# Patient Record
Sex: FEMALE | Race: WHITE | NOT HISPANIC OR LATINO | Employment: PART TIME | URBAN - NONMETROPOLITAN AREA
[De-identification: names, ages, dates, MRNs, and addresses within clinical notes are randomized per-mention and may not be internally consistent; named-entity substitution may affect disease eponyms.]

---

## 2017-05-22 ENCOUNTER — OFFICE VISIT (OUTPATIENT)
Dept: FAMILY MEDICINE | Facility: CLINIC | Age: 32
End: 2017-05-22
Payer: COMMERCIAL

## 2017-05-22 VITALS
WEIGHT: 139 LBS | BODY MASS INDEX: 27.29 KG/M2 | SYSTOLIC BLOOD PRESSURE: 124 MMHG | HEIGHT: 60 IN | TEMPERATURE: 99.3 F | HEART RATE: 68 BPM | RESPIRATION RATE: 16 BRPM | DIASTOLIC BLOOD PRESSURE: 82 MMHG

## 2017-05-22 DIAGNOSIS — R10.13 ABDOMINAL PAIN, EPIGASTRIC: Primary | ICD-10-CM

## 2017-05-22 LAB
ERYTHROCYTE [DISTWIDTH] IN BLOOD BY AUTOMATED COUNT: 13 % (ref 10–15)
HCT VFR BLD AUTO: 44.2 % (ref 35–47)
HGB BLD-MCNC: 15.6 G/DL (ref 11.7–15.7)
MCH RBC QN AUTO: 30.9 PG (ref 26.5–33)
MCHC RBC AUTO-ENTMCNC: 35.3 G/DL (ref 31.5–36.5)
MCV RBC AUTO: 88 FL (ref 78–100)
PLATELET # BLD AUTO: 270 10E9/L (ref 150–450)
RBC # BLD AUTO: 5.05 10E12/L (ref 3.8–5.2)
WBC # BLD AUTO: 9.9 10E9/L (ref 4–11)

## 2017-05-22 PROCEDURE — 99213 OFFICE O/P EST LOW 20 MIN: CPT | Performed by: FAMILY MEDICINE

## 2017-05-22 PROCEDURE — 36415 COLL VENOUS BLD VENIPUNCTURE: CPT | Performed by: FAMILY MEDICINE

## 2017-05-22 PROCEDURE — 83690 ASSAY OF LIPASE: CPT | Performed by: FAMILY MEDICINE

## 2017-05-22 PROCEDURE — 80053 COMPREHEN METABOLIC PANEL: CPT | Performed by: FAMILY MEDICINE

## 2017-05-22 PROCEDURE — 85027 COMPLETE CBC AUTOMATED: CPT | Performed by: FAMILY MEDICINE

## 2017-05-22 NOTE — NURSING NOTE
Chief Complaint   Patient presents with     Abdominal Pain       Initial /82 (Cuff Size: Adult Regular)  Pulse 68  Temp 99.3  F (37.4  C) (Tympanic)  Resp 16  Ht 5' (1.524 m)  Wt 139 lb (63 kg)  LMP 05/01/2017  Breastfeeding? No  BMI 27.15 kg/m2 Estimated body mass index is 27.15 kg/(m^2) as calculated from the following:    Height as of this encounter: 5' (1.524 m).    Weight as of this encounter: 139 lb (63 kg).  Medication Reconciliation: complete    Health Maintenance that is potentially due pending provider review:  NONE    n/a

## 2017-05-22 NOTE — PATIENT INSTRUCTIONS
Abdominal Pain  Abdominal pain is pain in the stomach or intestinal area. Everyone has this pain from time to time. In many cases it goes away on its own. But abdominal pain can sometimes be due to a serious problem, such as appendicitis. So it s important to know when to seek help.  Causes of abdominal pain  There are many possible causes of abdominal pain. Common causes in adults include:    Constipation, diarrhea, or gas    GERD (gastroesophageal reflux disease) movement of stomach acid into the esophagus, also known as acid reflux or heartburn    Peptic ulcer (a sore in the lining of the stomach or small intestine)    Inflammation of the gallbladder, liver, or pancreas    Gallstones or kidney stones    Appendicitis     Obstruction of the intestines     Hernia (bulging of an internal organ through a muscle or other tissue)    Urinary tract infections    In women, menstrual cramps, fibroids, or endometriosis of the uterus    Inflammation or infection of the intestines  Diagnosing the cause of abdominal pain  Your health care provider will examine you to help find the cause of your pain. If needed, tests will be ordered. Because abdominal pain has so many possible causes, it can be hard to discover the reason for the pain. Giving details about your pain can help. Be ready to tell your health care provider where and when you feel the pain and what makes it better or worse. Also mention whether you have other symptoms such as fever, tiredness, nausea, vomiting, or changes in bathroom habits.  Treating abdominal pain  Certain causes of pain, such as appendicitis or a bowel obstruction, need emergency treatment. Other problems can be treated with rest, fluids, or medications. Your health care provider can give you specific instructions for treatment or self-care based on the cause of your pain.  If you have vomiting or diarrhea, sip water or other clear fluids. When you are ready to eat solid foods again, start with  small amounts of easy-to-digest, low-fat foods, such as applesauce, toast, or crackers.   When to call the doctor  Call 911 or go to the hospital right away if you:    Can t pass stool and are vomiting    Are vomiting blood or have black, tarry diarrhea    Also have chest, neck, or shoulder pain    Feel like you are about to pass out    Have pain in your shoulder blades with nausea    Have sudden, excruciating abdominal pain    Have new, severe pain unlike any you have felt before    Have a belly that is rigid, hard, and tender to touch  Call your doctor if you have:    Pain for more than 5 days    Bloating for more than 2 days    Diarrhea for more than 5 days    Fever of 101 F (38.3 C) or higher    Pain that continues to worsen    Unexplained weight loss    Continued lack of appetite    Blood in the stool  How to prevent abdominal pain  Here are some tips to help prevent abdominal pain:    Eat smaller amounts of food at one time.    Avoid greasy, fried, or other high-fat foods.    Avoid foods that give you gas.    Exercise regularly.    Drink plenty of fluids.  To help prevent symptoms of gastroesophageal reflux disease (GERD):    Quit smoking.    Reduce alcohol and certain foods that increase stomach acid.     Lose excess weight.    Finish eating at least 2 hours before you go to bed or lie down.    Elevate the head of your bed.    6155-3077 [employer]. [address] [place] [address], [place] [address]. All rights reserved. This information is not intended as a substitute for professional medical care. Always follow your healthcare professional's instructions.

## 2017-05-22 NOTE — PROGRESS NOTES
SUBJECTIVE:                                                    Shira Forbes is a 32 year old female who presents to clinic today for the following health issues:      Abdominal Pain      Duration: about a month- progressively worsening    Description (location/character/radiation): epigastric right quadrant-radiatiating into back       Associated flank pain: None    Intensity:  moderate, severe    Accompanying signs and symptoms:        Fever/Chills: no        Gas/Bloating: no        Nausea/vomitting: YES       Diarrhea: no        Dysuria or Hematuria: no     History (previous similar pain/trauma/previous testing): none    Precipitating or alleviating factors:       Pain worse with eating/BM/urination: yes- pain gets worse about 2 hours after eating        Pain relieved by BM: no     Therapies tried and outcome: Tums, omeprazole, zantac, diet modification,     LMP:  2-3 weeks ago          Problem list and histories reviewed & adjusted, as indicated.  Additional history: as documented    Patient Active Problem List   Diagnosis     Contact dermatitis and other eczema, due to unspecified cause     Vitamin D deficiency     CARDIOVASCULAR SCREENING; LDL GOAL LESS THAN 160     Malrotation of intestine     S/P appy     H/O left inguinal hernia repair     Anxiety     Past Surgical History:   Procedure Laterality Date     APPENDECTOMY       LAPAROSCOPIC HERNIORRHAPHY INGUINAL  3/28/2012    Procedure:LAPAROSCOPIC HERNIORRHAPHY INGUINAL; Laparoscopic Left Inguinal Herniorrhaphy Possible Right Inguinal Hernia; Surgeon:EMMANUELLE CHAPPELL; Location:[facility]     SURGICAL HISTORY OF -   05/20/06    D&C for incomplete AB       Social History   Substance Use Topics     Smoking status: Never Smoker     Smokeless tobacco: Never Used     Alcohol use Yes      Comment: Occas/quit with pregnancy     Family History   Problem Relation Age of Onset     Psychotic Disorder Maternal Grandmother      Alcohol/Drug Maternal Grandmother       alcohol     HEART DISEASE Maternal Grandfather      MI     Hypertension Maternal Grandfather      DIABETES Maternal Grandfather      Arthritis Paternal Grandmother      RA     Parkinsonism Paternal Grandfather      Lipids Paternal Grandmother      HEART DISEASE Paternal Grandfather      pacemaker         Current Outpatient Prescriptions   Medication Sig Dispense Refill     clobetasol (TEMOVATE) 0.05 % ointment Apply sparingly to affected area twice daily as needed.  Do not apply to face. 60 g 1     triamcinolone (KENALOG) 0.1 % ointment Apply sparingly to affected area three times daily for 14 days. 80 g 1     Allergies   Allergen Reactions     Keflex [Cephalexin Monohydrate] Rash     Morphine Hives     Shellfish Allergy      Sulfa Drugs Rash     Recent Labs   Lab Test  05/23/16   0956  12/21/14   0847  06/28/13   1032  04/26/12   2220   ALT   --   18   --   20   CR   --   0.63   --   0.80   GFRESTIMATED   --   >90  Non  GFR Calc     --   85   GFRESTBLACK   --   >90   GFR Calc     --   >90   POTASSIUM   --   3.5   --   3.7   TSH  2.17   --   1.89  3.31      BP Readings from Last 3 Encounters:   05/22/17 124/82   05/23/16 126/87   09/29/15 110/60    Wt Readings from Last 3 Encounters:   05/22/17 139 lb (63 kg)   05/23/16 128 lb (58.1 kg)   09/29/15 141 lb 9.6 oz (64.2 kg)                  Labs reviewed in EPIC    Reviewed and updated as needed this visit by clinical staff       Reviewed and updated as needed this visit by Provider         ROS:  Constitutional, HEENT, cardiovascular, pulmonary, gi and gu systems are negative, except as otherwise noted.    OBJECTIVE:                                                    /82 (Cuff Size: Adult Regular)  Pulse 68  Temp 99.3  F (37.4  C) (Tympanic)  Resp 16  Ht 5' (1.524 m)  Wt 139 lb (63 kg)  LMP 05/01/2017  Breastfeeding? No  BMI 27.15 kg/m2  Body mass index is 27.15 kg/(m^2).  GENERAL: healthy, alert and no distress  EYES: Eyes  grossly normal to inspection, PERRL and conjunctivae and sclerae normal  NECK: no adenopathy, no asymmetry, masses, or scars and thyroid normal to palpation  RESP: lungs clear to auscultation - no rales, rhonchi or wheezes  CV: regular rate and rhythm, normal S1 S2, no S3 or S4, no murmur, click or rub, no peripheral edema and peripheral pulses strong  ABDOMEN: soft, mildly tender epigastrium and right upper quadrant without any guarding or rigidity, no organomegaly  MS: no gross musculoskeletal defects noted, no edema  NEURO: Normal strength and tone, mentation intact and speech normal     ASSESSMENT/PLAN:                                                          ICD-10-CM    1. Abdominal pain, epigastric R10.13 CBC with platelets     Comprehensive metabolic panel (BMP + Alb, Alk Phos, ALT, AST, Total. Bili, TP)     Lipase     US Abdomen Limited     H Pylori antigen, stool     32-year-old female presents with epigastric abdominal pain along with nausea. Symptoms started about a month ago, associated with food intake. Patient hasn't been eating well due to pain. LMP 2-3 weeks ago, declining pregnancy testing. She denies any constipation, diarrhea, urinary or other relevant systemic symptoms. She has had GERD before but states that current symptoms feels different. Differentials are broad including but not limited to GERD, peptic ulcer disease, biliary tract disorder and pancreatitis. CBC, CMP, lipase and h-pylori stool antigen ordered. Will do ultrasound biliary tract as well. Suggested to continue prilosec, well hydration and avoid Diet Coke (takes 1-2 cans daily). Will inform her with the results once available. Patient understood and in agreement with the above plan. All questions answered.        Patient Instructions       Abdominal Pain  Abdominal pain is pain in the stomach or intestinal area. Everyone has this pain from time to time. In many cases it goes away on its own. But abdominal pain can sometimes be  due to a serious problem, such as appendicitis. So it s important to know when to seek help.  Causes of abdominal pain  There are many possible causes of abdominal pain. Common causes in adults include:    Constipation, diarrhea, or gas    GERD (gastroesophageal reflux disease) movement of stomach acid into the esophagus, also known as acid reflux or heartburn    Peptic ulcer (a sore in the lining of the stomach or small intestine)    Inflammation of the gallbladder, liver, or pancreas    Gallstones or kidney stones    Appendicitis     Obstruction of the intestines     Hernia (bulging of an internal organ through a muscle or other tissue)    Urinary tract infections    In women, menstrual cramps, fibroids, or endometriosis of the uterus    Inflammation or infection of the intestines  Diagnosing the cause of abdominal pain  Your health care provider will examine you to help find the cause of your pain. If needed, tests will be ordered. Because abdominal pain has so many possible causes, it can be hard to discover the reason for the pain. Giving details about your pain can help. Be ready to tell your health care provider where and when you feel the pain and what makes it better or worse. Also mention whether you have other symptoms such as fever, tiredness, nausea, vomiting, or changes in bathroom habits.  Treating abdominal pain  Certain causes of pain, such as appendicitis or a bowel obstruction, need emergency treatment. Other problems can be treated with rest, fluids, or medications. Your health care provider can give you specific instructions for treatment or self-care based on the cause of your pain.  If you have vomiting or diarrhea, sip water or other clear fluids. When you are ready to eat solid foods again, start with small amounts of easy-to-digest, low-fat foods, such as applesauce, toast, or crackers.   When to call the doctor  Call 911 or go to the hospital right away if you:    Can t pass stool and are  vomiting    Are vomiting blood or have black, tarry diarrhea    Also have chest, neck, or shoulder pain    Feel like you are about to pass out    Have pain in your shoulder blades with nausea    Have sudden, excruciating abdominal pain    Have new, severe pain unlike any you have felt before    Have a belly that is rigid, hard, and tender to touch  Call your doctor if you have:    Pain for more than 5 days    Bloating for more than 2 days    Diarrhea for more than 5 days    Fever of 101 F (38.3 C) or higher    Pain that continues to worsen    Unexplained weight loss    Continued lack of appetite    Blood in the stool  How to prevent abdominal pain  Here are some tips to help prevent abdominal pain:    Eat smaller amounts of food at one time.    Avoid greasy, fried, or other high-fat foods.    Avoid foods that give you gas.    Exercise regularly.    Drink plenty of fluids.  To help prevent symptoms of gastroesophageal reflux disease (GERD):    Quit smoking.    Reduce alcohol and certain foods that increase stomach acid.     Lose excess weight.    Finish eating at least 2 hours before you go to bed or lie down.    Elevate the head of your bed.    [id number] [employer]. [address] [place] [address]. All rights reserved. This information is not intended as a substitute for professional medical care. Always follow your healthcare professional's instructions.            Carlos Herring MD  [facility]

## 2017-05-22 NOTE — MR AVS SNAPSHOT
After Visit Summary   5/22/2017    Shira Forbes    MRN: [id number]           Patient Information     Date Of Birth          1985        Visit Information        Provider Department      5/22/2017 3:40 PM Carlos Herring MD [facility]        Today's Diagnoses     Abdominal pain, epigastric    -  1      Care Instructions      Abdominal Pain  Abdominal pain is pain in the stomach or intestinal area. Everyone has this pain from time to time. In many cases it goes away on its own. But abdominal pain can sometimes be due to a serious problem, such as appendicitis. So it s important to know when to seek help.  Causes of abdominal pain  There are many possible causes of abdominal pain. Common causes in adults include:    Constipation, diarrhea, or gas    GERD (gastroesophageal reflux disease) movement of stomach acid into the esophagus, also known as acid reflux or heartburn    Peptic ulcer (a sore in the lining of the stomach or small intestine)    Inflammation of the gallbladder, liver, or pancreas    Gallstones or kidney stones    Appendicitis     Obstruction of the intestines     Hernia (bulging of an internal organ through a muscle or other tissue)    Urinary tract infections    In women, menstrual cramps, fibroids, or endometriosis of the uterus    Inflammation or infection of the intestines  Diagnosing the cause of abdominal pain  Your health care provider will examine you to help find the cause of your pain. If needed, tests will be ordered. Because abdominal pain has so many possible causes, it can be hard to discover the reason for the pain. Giving details about your pain can help. Be ready to tell your health care provider where and when you feel the pain and what makes it better or worse. Also mention whether you have other symptoms such as fever, tiredness, nausea, vomiting, or changes in bathroom habits.  Treating abdominal pain  Certain causes of pain, such as  appendicitis or a bowel obstruction, need emergency treatment. Other problems can be treated with rest, fluids, or medications. Your health care provider can give you specific instructions for treatment or self-care based on the cause of your pain.  If you have vomiting or diarrhea, sip water or other clear fluids. When you are ready to eat solid foods again, start with small amounts of easy-to-digest, low-fat foods, such as applesauce, toast, or crackers.   When to call the doctor  Call 911 or go to the hospital right away if you:    Can t pass stool and are vomiting    Are vomiting blood or have black, tarry diarrhea    Also have chest, neck, or shoulder pain    Feel like you are about to pass out    Have pain in your shoulder blades with nausea    Have sudden, excruciating abdominal pain    Have new, severe pain unlike any you have felt before    Have a belly that is rigid, hard, and tender to touch  Call your doctor if you have:    Pain for more than 5 days    Bloating for more than 2 days    Diarrhea for more than 5 days    Fever of 101 F (38.3 C) or higher    Pain that continues to worsen    Unexplained weight loss    Continued lack of appetite    Blood in the stool  How to prevent abdominal pain  Here are some tips to help prevent abdominal pain:    Eat smaller amounts of food at one time.    Avoid greasy, fried, or other high-fat foods.    Avoid foods that give you gas.    Exercise regularly.    Drink plenty of fluids.  To help prevent symptoms of gastroesophageal reflux disease (GERD):    Quit smoking.    Reduce alcohol and certain foods that increase stomach acid.     Lose excess weight.    Finish eating at least 2 hours before you go to bed or lie down.    Elevate the head of your bed.    [id number] [employer]. [address] [place] [address], [place] [address]. All rights reserved. This information is not intended as a substitute for professional medical care. Always follow your healthcare  professional's instructions.              Follow-ups after your visit        Future tests that were ordered for you today     Open Future Orders        Priority Expected Expires Ordered    H Pylori antigen, stool Routine  6/21/2017 5/22/2017    US Abdomen Limited Routine  5/22/2018 5/22/2017            Who to contact     If you have questions or need follow up information about today's clinic visit or your schedule please contact [facility] directly at [phone].  Normal or non-critical lab and imaging results will be communicated to you by [employer], letter or phone within 4 business days after the clinic has received the results. If you do not hear from us within 7 days, please contact the clinic through [employer] or phone. If you have a critical or abnormal lab result, we will notify you by phone as soon as possible.  Submit refill requests through [employer] or call your pharmacy and they will forward the refill request to us. Please allow 3 business days for your refill to be completed.          Additional Information About Your Visit        [employer] Information     [employer] gives you secure access to your electronic health record. If you see a primary care provider, you can also send messages to your care team and make appointments. If you have questions, please call your primary care clinic.  If you do not have a primary care provider, please call [phone] and they will assist you.        Care EveryWhere ID     This is your Care EveryWhere ID. This could be used by other organizations to access your [facility] medical records  [id number]        Your Vitals Were     Pulse Temperature Respirations Height Last Period Breastfeeding?    68 99.3  F (37.4  C) (Tympanic) 16 5' (1.524 m) 05/01/2017 No    BMI (Body Mass Index)                   27.15 kg/m2            Blood Pressure from Last 3 Encounters:   05/22/17 124/82   05/23/16 126/87   09/29/15 110/60    Weight from Last 3 Encounters:   05/22/17  139 lb (63 kg)   05/23/16 128 lb (58.1 kg)   09/29/15 141 lb 9.6 oz (64.2 kg)              We Performed the Following     CBC with platelets     Comprehensive metabolic panel (BMP + Alb, Alk Phos, ALT, AST, Total. Bili, TP)     Lipase          Today's Medication Changes          These changes are accurate as of: 5/22/17  4:12 PM.  If you have any questions, ask your nurse or doctor.               Stop taking these medicines if you haven't already. Please contact your care team if you have questions.     levonorgestrel 20 MCG/24HR IUD   Commonly known as:  MIRENA   Stopped by:  Carlos Herring MD           sertraline 50 MG tablet   Commonly known as:  ZOLOFT   Stopped by:  Carlos Herring MD                    Primary Care Provider Office Phone # Fax #    Sarah Elyssa John [phone]       [address] [address]        Thank you!     Thank you for choosing [facility]  for your care. Our goal is always to provide you with excellent care. Hearing back from our patients is one way we can continue to improve our services. Please take a few minutes to complete the written survey that you may receive in the mail after your visit with us. Thank you!             Your Updated Medication List - Protect others around you: Learn how to safely use, store and throw away your medicines at www.disposemymeds.org.          This list is accurate as of: 5/22/17  4:12 PM.  Always use your most recent med list.                   Brand Name Dispense Instructions for use    clobetasol 0.05 % ointment    TEMOVATE    60 g    Apply sparingly to affected area twice daily as needed.  Do not apply to face.       triamcinolone 0.1 % ointment    KENALOG    80 g    Apply sparingly to affected area three times daily for 14 days.

## 2017-05-23 LAB
ALBUMIN SERPL-MCNC: 4.1 G/DL (ref 3.4–5)
ALP SERPL-CCNC: 47 U/L (ref 40–150)
ALT SERPL W P-5'-P-CCNC: 16 U/L (ref 0–50)
ANION GAP SERPL CALCULATED.3IONS-SCNC: 9 MMOL/L (ref 3–14)
AST SERPL W P-5'-P-CCNC: 14 U/L (ref 0–45)
BILIRUB SERPL-MCNC: 0.2 MG/DL (ref 0.2–1.3)
BUN SERPL-MCNC: 12 MG/DL (ref 7–30)
CALCIUM SERPL-MCNC: 8.7 MG/DL (ref 8.5–10.1)
CHLORIDE SERPL-SCNC: 105 MMOL/L (ref 94–109)
CO2 SERPL-SCNC: 22 MMOL/L (ref 20–32)
CREAT SERPL-MCNC: 0.67 MG/DL (ref 0.52–1.04)
GFR SERPL CREATININE-BSD FRML MDRD: ABNORMAL ML/MIN/1.7M2
GLUCOSE SERPL-MCNC: 100 MG/DL (ref 70–99)
LIPASE SERPL-CCNC: 147 U/L (ref 73–393)
POTASSIUM SERPL-SCNC: 3.8 MMOL/L (ref 3.4–5.3)
PROT SERPL-MCNC: 7.6 G/DL (ref 6.8–8.8)
SODIUM SERPL-SCNC: 136 MMOL/L (ref 133–144)

## 2017-05-23 PROCEDURE — 87338 HPYLORI STOOL AG IA: CPT | Performed by: FAMILY MEDICINE

## 2017-05-24 DIAGNOSIS — R10.13 ABDOMINAL PAIN, EPIGASTRIC: ICD-10-CM

## 2017-05-26 LAB
H PYLORI AG STL QL IA: NORMAL
MICRO REPORT STATUS: NORMAL
SPECIMEN SOURCE: NORMAL

## 2017-06-12 ENCOUNTER — RADIANT APPOINTMENT (OUTPATIENT)
Dept: ULTRASOUND IMAGING | Facility: CLINIC | Age: 32
End: 2017-06-12
Attending: FAMILY MEDICINE
Payer: COMMERCIAL

## 2017-06-12 DIAGNOSIS — R10.13 ABDOMINAL PAIN, EPIGASTRIC: ICD-10-CM

## 2017-06-12 PROCEDURE — 76705 ECHO EXAM OF ABDOMEN: CPT

## 2017-07-14 DIAGNOSIS — R22.0 LIP SWELLING: Primary | ICD-10-CM

## 2017-07-14 RX ORDER — PREDNISONE 20 MG/1
20 TABLET ORAL DAILY
Qty: 7 TABLET | Refills: 0 | Status: SHIPPED | OUTPATIENT
Start: 2017-07-14 | End: 2017-07-21

## 2017-07-17 ENCOUNTER — TELEPHONE (OUTPATIENT)
Dept: ALLERGY | Facility: CLINIC | Age: 32
End: 2017-07-17

## 2017-07-17 NOTE — TELEPHONE ENCOUNTER
Spoke with patient.  She is coming in for lip, eye, tongue swelling.  Diagnosed with angioedema.  Patient states she is miserable.  Currently has appointment for 7/26/17.  Patient is on antihistamines.  I told her this was fine as we often don't do allergy testing for foods or environmental allergies with angioedema diagnosis.  Patient would like to be seen this week.  Scheduled her for 7/19/17 with Dr. Grove.  Aleah Rader RN

## 2017-07-17 NOTE — TELEPHONE ENCOUNTER
Reason for Call:  Other question    Detailed comments: Pt wants to know if she will have skin testing done at her first appt? Please call to discuss    Phone Number Patient can be reached at: Home number on file [phone] (home)    Best Time: today    Can we leave a detailed message on this number? YES    Call taken on 7/17/2017 at 8:41 AM by Twyla Lopez

## 2017-07-19 ENCOUNTER — OFFICE VISIT (OUTPATIENT)
Dept: ALLERGY | Facility: CLINIC | Age: 32
End: 2017-07-19
Payer: COMMERCIAL

## 2017-07-19 VITALS
TEMPERATURE: 98.7 F | HEIGHT: 61 IN | DIASTOLIC BLOOD PRESSURE: 93 MMHG | BODY MASS INDEX: 26.13 KG/M2 | OXYGEN SATURATION: 99 % | HEART RATE: 81 BPM | SYSTOLIC BLOOD PRESSURE: 130 MMHG | WEIGHT: 138.4 LBS

## 2017-07-19 DIAGNOSIS — T78.3XXA ANGIOEDEMA, INITIAL ENCOUNTER: Primary | ICD-10-CM

## 2017-07-19 LAB — CRP SERPL-MCNC: <2.9 MG/L (ref 0–8)

## 2017-07-19 PROCEDURE — 86160 COMPLEMENT ANTIGEN: CPT | Mod: 90 | Performed by: ALLERGY & IMMUNOLOGY

## 2017-07-19 PROCEDURE — 86161 COMPLEMENT/FUNCTION ACTIVITY: CPT | Mod: 90 | Performed by: ALLERGY & IMMUNOLOGY

## 2017-07-19 PROCEDURE — 36415 COLL VENOUS BLD VENIPUNCTURE: CPT | Performed by: ALLERGY & IMMUNOLOGY

## 2017-07-19 PROCEDURE — 86140 C-REACTIVE PROTEIN: CPT | Performed by: ALLERGY & IMMUNOLOGY

## 2017-07-19 PROCEDURE — 99000 SPECIMEN HANDLING OFFICE-LAB: CPT | Performed by: ALLERGY & IMMUNOLOGY

## 2017-07-19 PROCEDURE — 99203 OFFICE O/P NEW LOW 30 MIN: CPT | Performed by: ALLERGY & IMMUNOLOGY

## 2017-07-19 RX ORDER — LORATADINE 10 MG/1
10 TABLET ORAL DAILY
COMMUNITY
End: 2020-02-03

## 2017-07-19 NOTE — NURSING NOTE
Chief Complaint   Patient presents with     Allergy Consult     swelling in lips, eyes and tongue- 4 episodes     Ref: Dr. Carlos Herring       Initial There were no vitals taken for this visit. Estimated body mass index is 27.15 kg/(m^2) as calculated from the following:    Height as of 5/22/17: 5' (1.524 m).    Weight as of 5/22/17: 139 lb (63 kg).  Medication Reconciliation: complete

## 2017-07-19 NOTE — MR AVS SNAPSHOT
After Visit Summary   7/19/2017    Shira Forbes    MRN: [id number]           Patient Information     Date Of Birth          1985        Visit Information        Provider Department      7/19/2017 11:20 AM Jose Juan Grove MD [facility]        Today's Diagnoses     Angioedema, initial encounter    -  1      Care Instructions    If you have any questions regarding your allergies, asthma, or what we discussed during your visit today please call the allergy clinic or contact us via [employer].    [facility]: [phone]      You may take zyrtec (cetirizine) either daily or as needed to treat your symptoms of swelling. The maximum dose would be 20mg (2 tablets) twice daily. If you choose to take this daily I recommend taking it for at least 3 months.          Follow-ups after your visit        Who to contact     If you have questions or need follow up information about today's clinic visit or your schedule please contact [facility] directly at [phone].  Normal or non-critical lab and imaging results will be communicated to you by [employer], letter or phone within 4 business days after the clinic has received the results. If you do not hear from us within 7 days, please contact the clinic through [employer] or phone. If you have a critical or abnormal lab result, we will notify you by phone as soon as possible.  Submit refill requests through [employer] or call your pharmacy and they will forward the refill request to us. Please allow 3 business days for your refill to be completed.          Additional Information About Your Visit        [employer] Information     [employer] gives you secure access to your electronic health record. If you see a primary care provider, you can also send messages to your care team and make appointments. If you have questions, please call your primary care clinic.  If you do not have a primary care provider, please call [phone] and they  "will assist you.        Care EveryWhere ID     This is your Care EveryWhere ID. This could be used by other organizations to access your [place] medical records  [id number]        Your Vitals Were     Pulse Temperature Height Pulse Oximetry BMI (Body Mass Index)       81 98.7  F (37.1  C) (Tympanic) 5' 1\" (1.549 m) 99% 26.15 kg/m2        Blood Pressure from Last 3 Encounters:   07/19/17 (!) 130/93   05/22/17 124/82   05/23/16 126/87    Weight from Last 3 Encounters:   07/19/17 138 lb 6.4 oz (62.8 kg)   05/22/17 139 lb (63 kg)   05/23/16 128 lb (58.1 kg)              We Performed the Following     C1 esterase inhibitor functional     C1 Esterase inhibitor total     Complement C4     Complement component 1Q     CRP inflammation        Primary Care Provider Office Phone # Fax #    Sarah Elyssa John [phone]       [facility] [address] [address]        Equal Access to Services     KIMBERLY BRIGHT : Hadii aad ku hadasho Soomaali, waaxda luqadaha, qaybta kaalmada adeegyada, amelia mckenzie . So [facility] [phone].    ATENCIÓN: Si habla español, tiene a zimmer disposición servicios gratuitos de asistencia lingüística. Llame al [phone].    We comply with applicable federal civil rights laws and [place] laws. We do not discriminate on the basis of race, color, national origin, age, disability sex, sexual orientation or gender identity.            Thank you!     Thank you for choosing [facility]  for your care. Our goal is always to provide you with excellent care. Hearing back from our patients is one way we can continue to improve our services. Please take a few minutes to complete the written survey that you may receive in the mail after your visit with us. Thank you!             Your Updated Medication List - Protect others around you: Learn how to safely use, store and throw away your medicines at www.disposemymeds.org.        "   This list is accurate as of: 7/19/17 11:53 AM.  Always use your most recent med list.                   Brand Name Dispense Instructions for use Diagnosis    clobetasol 0.05 % ointment    TEMOVATE    60 g    Apply sparingly to affected area twice daily as needed.  Do not apply to face.    Contact dermatitis and eczema       DIPHENDRYL PO      Take 25 mg by mouth        loratadine 10 MG tablet    CLARITIN     Take 10 mg by mouth daily        predniSONE 20 MG tablet    DELTASONE    7 tablet    Take 1 tablet (20 mg) by mouth daily for 7 days    Lip swelling       triamcinolone 0.1 % ointment    KENALOG    80 g    Apply sparingly to affected area three times daily for 14 days.    Contact dermatitis and eczema

## 2017-07-19 NOTE — PATIENT INSTRUCTIONS
If you have any questions regarding your allergies, asthma, or what we discussed during your visit today please call the allergy clinic or contact us via [employer]    [facility]: [phone]      You may take zyrtec (cetirizine) either daily or as needed to treat your symptoms of swelling. The maximum dose would be 20mg (2 tablets) twice daily. If you choose to take this daily I recommend taking it for at least 3 months.

## 2017-07-19 NOTE — PROGRESS NOTES
Dear Carlos Herring MD    Thank you for referring your patient Shira Forbes to the Allergy/Immunology Clinic. Shira Forbes was seen in the Allergy Clinic at [facility]. The following are my recommendations regarding her Angioedema    1. Will obtain CRP, C4, C1q, C1 esterase inhibitor total and function  2. Begin taking cetirizine 10mg daily to prevent symptoms or may treat episodically with high dose cetirizine - 20mg twice daily  3. Will pursue additional evaluation pending above lab results  4. Follow-up in 1 year, sooner if needed      Shira Forbes is a 32 year old White female being seen today in consultation for angioedema. She has had several episodes of angioedema since June. At the end of June Shira participated in a color run and the following morning she had isolated lip swelling. She initially thought that she was having a delayed reaction to the dye used during the run. The lip swelling occurred only on the right side and she felt some mild swelling on the right side of her tongue. Shira denies any associated rash or hives, difficulty swallowing or breathing and she had no nausea, vomiting, dizziness, or lightheadedness. She took benadryl and slept the rest of the day and her symptoms had fully resolved after about 8 hours. Her next episode occurred last week Wednesday. She had [employer] for dinner and went to visit her grandmother. About 2 hours after eating she developed severe abdominal pain and within 10 minutes of the abdominal pain she developed right sided lip swelling. Shira denies associated hives, vomiting, diarrhea, dizziness, or lightheadedness. She reports only having stomach cramping and discomfort along with the lip swelling and mild tongue swelling. She was not able to take benadryl for about 45-60 minutes and about 6 or 7 hours later her symptoms resolved. Shira states that she did eventually throw up after she returned home and had felt nauseated  "for several hours. Her third reaction occurred this past Friday. Shira had biked to work and stopped to get a snack of almonds and Vitamin Water. She went to the clinic and 10 minutes later developed right sided lip and tongue swelling without any other symptoms. She took benadryl at work and repeated the dose about an hour later. Her symptoms began around 8AM and fully resolved by 2PM. Shira did not have any other symptoms including abdominal pain with this reaction. Most recently she developed symptoms on Sunday. She spent the day doing yard work and landscaping and took a break for lunch. She ate a sandwich and about an hour and a half later she didn't feel well and was nauseated, diaphoretic, and felt \"uneasy\" for about 20 minutes. She subsequently developed swelling of her left eye but had no associated lip or tongue swelling, hives, or other symptoms. She took 2 doses of benadryl and the following day she still had some puffiness around her eye but her symptoms had otherwise resolved. For these recurrent symptoms Shira has only taken benadryl and has not taken prednisone or any other antihistamines. Shira has not identified any particular trigger for her symptoms and denies any new foods or changes in her environment. She does not feel she has significant seasonal allergy symptoms.    Shira states that she has had symptoms of hives in the past and used to have them frequently in her early 20s. Since then she has only had 1 or 2 episodes of hives.    Shira also suspects she has a shellfish allergy. Around age 13 she ate some shrimp and about 1 hour later developed nausea and throat tightness. She has never been formally tested for this but has since avoided all shellfish but does eat regular fish. She does not carry an epinephrine auto-injector.    Component      Latest Ref Rng & Units 5/22/2017   Sodium      133 - 144 mmol/L 136   Potassium      3.4 - 5.3 mmol/L 3.8   Chloride      94 - 109 mmol/L 105 "   Carbon Dioxide      20 - 32 mmol/L 22   Anion Gap      3 - 14 mmol/L 9   Glucose      70 - 99 mg/dL 100 (H)   Urea Nitrogen      7 - 30 mg/dL 12   Creatinine      0.52 - 1.04 mg/dL 0.67   GFR Estimate      >60 mL/min/1.7m2 >90 . . .   GFR Estimate If Black      >60 mL/min/1.7m2 >90 . . .   Calcium      8.5 - 10.1 mg/dL 8.7   Bilirubin Total      0.2 - 1.3 mg/dL 0.2   Albumin      3.4 - 5.0 g/dL 4.1   Protein Total      6.8 - 8.8 g/dL 7.6   Alkaline Phosphatase      40 - 150 U/L 47   ALT      0 - 50 U/L 16   AST      0 - 45 U/L 14   WBC      4.0 - 11.0 10e9/L 9.9   RBC Count      3.8 - 5.2 10e12/L 5.05   Hemoglobin      11.7 - 15.7 g/dL 15.6   Hematocrit      35.0 - 47.0 % 44.2   MCV      78 - 100 fl 88   MCH      26.5 - 33.0 pg 30.9   MCHC      31.5 - 36.5 g/dL 35.3   RDW      10.0 - 15.0 % 13.0   Platelet Count      150 - 450 10e9/L 270       Past Medical History:   Diagnosis Date     Chickenpox      PONV (postoperative nausea and vomiting)      Family History   Problem Relation Age of Onset     Psychotic Disorder Maternal Grandmother      Alcohol/Drug Maternal Grandmother      alcohol     HEART DISEASE Maternal Grandfather      MI     Hypertension Maternal Grandfather      DIABETES Maternal Grandfather      Arthritis Paternal Grandmother      RA     Lipids Paternal Grandmother      Parkinsonism Paternal Grandfather      HEART DISEASE Paternal Grandfather      pacemaker     Past Surgical History:   Procedure Laterality Date     APPENDECTOMY       LAPAROSCOPIC HERNIORRHAPHY INGUINAL  3/28/2012    Procedure:LAPAROSCOPIC HERNIORRHAPHY INGUINAL; Laparoscopic Left Inguinal Herniorrhaphy Possible Right Inguinal Hernia; Surgeon:EMMANUELLE CHAPPELL; Location:[place] [facility]     SURGICAL HISTORY OF -   05/20/06    D&C for incomplete AB       ENVIRONMENTAL HISTORY: [place] home is heated with a forced air. They does have central air conditioning. The patient's bedroom is furnished  with carpeting in bedroom and fabric window coverings.  Pets inside the house include None. There is not history of cockroach or mice infestation. There is/are 0 smokers in the house.  The house does not have a damp basement.     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 She has missed 0 days of school/work due to swelling. She lives with her father, son and daughter.  Her spouse works as a electrition .    REVIEW OF SYSTEMS:  General: positive  for weight gain. negative for weight loss. negative for changes in sleep.   Eyes: negative for itching. negative for redness. negative for tearing/watering.  Ears: negative for fullness. negative for hearing loss. negative for dizziness.   Nose: negative for snoring.negative for changes in smell. negative for drainage.   Throat: negative for hoarseness. negative for sore throat. negative for trouble swallowing.   Lungs: negative for shortness of breath.negative for wheezing. negative for sputum production.   Cardiovascular: negative for chest pain. negative for swelling of ankles. negative for fast or irregular heartbeat.   Gastrointestinal: positive  for nausea. negative for heartburn. positive for acid reflux.   Musculoskeletal: negative for joint pain. negative for joint stiffness. negative for joint swelling.   Neurologic: negative for seizures. negative for fainting. negative for weakness.   Psychiatric: negative for changes in mood. negative for anxiety.   Endocrine: negative for cold intolerance. negative for heat intolerance. negative for tremors.   Hematologic: negative for easy bruising. negative for easy bleeding.  Integumentary: positive  for rash. negative for scaling. negative for nail changes.       Current Outpatient Prescriptions:      loratadine (CLARITIN) 10 MG tablet, Take 10 mg by mouth daily, Disp: , Rfl:      DiphenhydrAMINE HCl (DIPHENDRYL PO), Take 25 mg by mouth, Disp: , Rfl:      clobetasol (TEMOVATE) 0.05 % ointment, Apply  "sparingly to affected area twice daily as needed.  Do not apply to face., Disp: 60 g, Rfl: 1     triamcinolone (KENALOG) 0.1 % ointment, Apply sparingly to affected area three times daily for 14 days., Disp: 80 g, Rfl: 1     predniSONE (DELTASONE) 20 MG tablet, Take 1 tablet (20 mg) by mouth daily for 7 days (Patient not taking: Reported on 7/19/2017), Disp: 7 tablet, Rfl: 0  Immunization History   Administered Date(s) Administered     Influenza (IIV3) 10/15/2008, 10/19/2012     Influenza Vaccine IM 3yrs+ 4 Valent IIV4 09/29/2015, 09/26/2016     TD (ADULT, 7+) 02/01/2004     TDAP Vaccine (Adacel) 05/26/2015     Allergies   Allergen Reactions     Keflex [Cephalexin Monohydrate] Rash     Morphine Hives     Shellfish Allergy      Sulfa Drugs Rash         EXAM:   BP (!) 130/93 (BP Location: Left arm, Patient Position: Chair, Cuff Size: Adult Regular)  Pulse 81  Temp 98.7  F (37.1  C) (Tympanic)  Ht 5' 1\" (1.549 m)  Wt 138 lb 6.4 oz (62.8 kg)  SpO2 99%  BMI 26.15 kg/m2  GENERAL APPEARANCE: alert, cooperative and not in distress  SKIN: no rashes, no lesions  HEAD: atraumatic, normocephalic  EYES: lids and lashes normal, conjunctivae and sclerae clear, pupils equal, round, reactive to light, EOM full and intact  ENT: no scars or lesions, nasal exam showed no discharge, swelling or lesions noted, otoscopy showed external auditory canals clear, tympanic membranes normal, tongue midline and normal, soft palate, uvula, and tonsils normal  NECK: no asymmetry, masses, or scars, supple without significant adenopathy  LUNGS: unlabored respirations, no intercostal retractions or accessory muscle use, clear to auscultation without rales or wheezes  HEART: regular rate and rhythm without murmurs and normal S1 and S2  MUSCULOSKELETAL: no musculoskeletal defects are noted  NEURO: no focal deficits noted, mental status intact  PSYCH: does not appear depressed or anxious and short and long term memory appears intact    WORKUP: " None    ASSESSMENT/PLAN:  Shira Forbes is a 32 year old female here for evaluation of recurrent episodes of angioedema. She has had several reactions over the past couple of weeks with no specific identifiable trigger. Her symptoms resolve fairly quickly after taking antihistamines. We discussed potential various causes of angioedema including hereditary and acquired forms of angioedema. She has no known family history of angioedema.    1. Will obtain CRP, C4, C1q, C1 esterase inhibitor total and function  2. Begin taking cetirizine 10mg daily to prevent symptoms or may treat episodically with high dose cetirizine - 20mg twice daily  3. Will pursue additional evaluation pending above lab results  4. Follow-up in 1 year, sooner if needed      Jose Juan Grove MD  Allergy/Immunology  [facility] and [place], [place]      Chart documentation done in part with Dragon Voice Recognition Software. Although reviewed after completion, some word and grammatical errors may remain.

## 2017-07-20 LAB — C4 SERPL-MCNC: 22 MG/DL (ref 15–50)

## 2017-07-21 LAB
C1INH FUNCTIONAL/C1INH TOTAL MFR SERPL: 86 %
C1INH SERPL-MCNC: 27 MG/DL

## 2017-07-25 LAB — C1Q SERPL-MCNC: 165 UG/ML

## 2018-12-02 ENCOUNTER — HEALTH MAINTENANCE LETTER (OUTPATIENT)
Age: 33
End: 2018-12-02

## 2018-12-12 ENCOUNTER — OFFICE VISIT (OUTPATIENT)
Dept: FAMILY MEDICINE | Facility: CLINIC | Age: 33
End: 2018-12-12
Payer: COMMERCIAL

## 2018-12-12 VITALS
HEART RATE: 82 BPM | TEMPERATURE: 97.2 F | OXYGEN SATURATION: 100 % | RESPIRATION RATE: 12 BRPM | DIASTOLIC BLOOD PRESSURE: 82 MMHG | SYSTOLIC BLOOD PRESSURE: 124 MMHG

## 2018-12-12 DIAGNOSIS — M25.50 MULTIPLE JOINT PAIN: Primary | ICD-10-CM

## 2018-12-12 DIAGNOSIS — L25.9 CONTACT DERMATITIS AND ECZEMA: ICD-10-CM

## 2018-12-12 LAB
ALBUMIN SERPL-MCNC: 4.1 G/DL (ref 3.4–5)
ALP SERPL-CCNC: 44 U/L (ref 40–150)
ALT SERPL W P-5'-P-CCNC: 21 U/L (ref 0–50)
ANION GAP SERPL CALCULATED.3IONS-SCNC: 5 MMOL/L (ref 3–14)
AST SERPL W P-5'-P-CCNC: 10 U/L (ref 0–45)
BILIRUB SERPL-MCNC: 0.3 MG/DL (ref 0.2–1.3)
BUN SERPL-MCNC: 11 MG/DL (ref 7–30)
CALCIUM SERPL-MCNC: 8.6 MG/DL (ref 8.5–10.1)
CHLORIDE SERPL-SCNC: 106 MMOL/L (ref 94–109)
CO2 SERPL-SCNC: 27 MMOL/L (ref 20–32)
CREAT SERPL-MCNC: 0.7 MG/DL (ref 0.52–1.04)
CRP SERPL-MCNC: <2.9 MG/L (ref 0–8)
ERYTHROCYTE [DISTWIDTH] IN BLOOD BY AUTOMATED COUNT: 13 % (ref 10–15)
ERYTHROCYTE [SEDIMENTATION RATE] IN BLOOD BY WESTERGREN METHOD: 4 MM/H (ref 0–20)
GFR SERPL CREATININE-BSD FRML MDRD: >90 ML/MIN/1.7M2
GLUCOSE SERPL-MCNC: 88 MG/DL (ref 70–99)
HCT VFR BLD AUTO: 42.3 % (ref 35–47)
HGB BLD-MCNC: 14.2 G/DL (ref 11.7–15.7)
MCH RBC QN AUTO: 30.8 PG (ref 26.5–33)
MCHC RBC AUTO-ENTMCNC: 33.6 G/DL (ref 31.5–36.5)
MCV RBC AUTO: 92 FL (ref 78–100)
PLATELET # BLD AUTO: 255 10E9/L (ref 150–450)
POTASSIUM SERPL-SCNC: 4 MMOL/L (ref 3.4–5.3)
PROT SERPL-MCNC: 7.3 G/DL (ref 6.8–8.8)
RBC # BLD AUTO: 4.61 10E12/L (ref 3.8–5.2)
SODIUM SERPL-SCNC: 138 MMOL/L (ref 133–144)
TSH SERPL DL<=0.005 MIU/L-ACNC: 1.41 MU/L (ref 0.4–4)
WBC # BLD AUTO: 7.3 10E9/L (ref 4–11)

## 2018-12-12 PROCEDURE — 99214 OFFICE O/P EST MOD 30 MIN: CPT | Performed by: FAMILY MEDICINE

## 2018-12-12 PROCEDURE — 83876 ASSAY MYELOPEROXIDASE: CPT | Performed by: FAMILY MEDICINE

## 2018-12-12 PROCEDURE — 86038 ANTINUCLEAR ANTIBODIES: CPT | Performed by: FAMILY MEDICINE

## 2018-12-12 PROCEDURE — 80053 COMPREHEN METABOLIC PANEL: CPT | Performed by: FAMILY MEDICINE

## 2018-12-12 PROCEDURE — 85652 RBC SED RATE AUTOMATED: CPT | Performed by: FAMILY MEDICINE

## 2018-12-12 PROCEDURE — 86140 C-REACTIVE PROTEIN: CPT | Performed by: FAMILY MEDICINE

## 2018-12-12 PROCEDURE — 86618 LYME DISEASE ANTIBODY: CPT | Performed by: FAMILY MEDICINE

## 2018-12-12 PROCEDURE — 36415 COLL VENOUS BLD VENIPUNCTURE: CPT | Performed by: FAMILY MEDICINE

## 2018-12-12 PROCEDURE — 86235 NUCLEAR ANTIGEN ANTIBODY: CPT | Performed by: FAMILY MEDICINE

## 2018-12-12 PROCEDURE — 86431 RHEUMATOID FACTOR QUANT: CPT | Performed by: FAMILY MEDICINE

## 2018-12-12 PROCEDURE — 85027 COMPLETE CBC AUTOMATED: CPT | Performed by: FAMILY MEDICINE

## 2018-12-12 PROCEDURE — 83516 IMMUNOASSAY NONANTIBODY: CPT | Performed by: FAMILY MEDICINE

## 2018-12-12 PROCEDURE — 86225 DNA ANTIBODY NATIVE: CPT | Performed by: FAMILY MEDICINE

## 2018-12-12 PROCEDURE — 84443 ASSAY THYROID STIM HORMONE: CPT | Performed by: FAMILY MEDICINE

## 2018-12-12 PROCEDURE — 86200 CCP ANTIBODY: CPT | Performed by: FAMILY MEDICINE

## 2018-12-12 RX ORDER — CLOBETASOL PROPIONATE 0.5 MG/G
OINTMENT TOPICAL
Qty: 60 G | Refills: 1 | Status: SHIPPED | OUTPATIENT
Start: 2018-12-12 | End: 2020-09-04

## 2018-12-12 RX ORDER — TRIAMCINOLONE ACETONIDE 1 MG/G
OINTMENT TOPICAL
Qty: 80 G | Refills: 1 | Status: SHIPPED | OUTPATIENT
Start: 2018-12-12 | End: 2020-09-04

## 2018-12-12 NOTE — LETTER
December 17, 2018      Shira Forbes  [address] [address]        Dear ,    We are writing to inform you of your test results.    Lab results came back unremarkable including rheumatological workup. Follow up if symptoms persist or worsen, can also consider rheumatology consult if needed. Let us know if there are any questions.     Resulted Orders   CBC with platelets   Result Value Ref Range    WBC 7.3 4.0 - 11.0 10e9/L    RBC Count 4.61 3.8 - 5.2 10e12/L    Hemoglobin 14.2 11.7 - 15.7 g/dL    Hematocrit 42.3 35.0 - 47.0 %    MCV 92 78 - 100 fl    MCH 30.8 26.5 - 33.0 pg    MCHC 33.6 31.5 - 36.5 g/dL    RDW 13.0 10.0 - 15.0 %    Platelet Count 255 150 - 450 10e9/L   Comprehensive metabolic panel (BMP + Alb, Alk Phos, ALT, AST, Total. Bili, TP)   Result Value Ref Range    Sodium 138 133 - 144 mmol/L    Potassium 4.0 3.4 - 5.3 mmol/L    Chloride 106 94 - 109 mmol/L    Carbon Dioxide 27 20 - 32 mmol/L    Anion Gap 5 3 - 14 mmol/L    Glucose 88 70 - 99 mg/dL      Comment:      Fasting specimen    Urea Nitrogen 11 7 - 30 mg/dL    Creatinine 0.70 0.52 - 1.04 mg/dL    GFR Estimate >90 >60 mL/min/1.7m2      Comment:      Non  GFR Calc    GFR Estimate If Black >90 >60 mL/min/1.7m2      Comment:       GFR Calc    Calcium 8.6 8.5 - 10.1 mg/dL    Bilirubin Total 0.3 0.2 - 1.3 mg/dL    Albumin 4.1 3.4 - 5.0 g/dL    Protein Total 7.3 6.8 - 8.8 g/dL    Alkaline Phosphatase 44 40 - 150 U/L    ALT 21 0 - 50 U/L    AST 10 0 - 45 U/L   ESR: Erythrocyte sedimentation rate   Result Value Ref Range    Sed Rate 4 0 - 20 mm/h   Lyme Disease Yeimy with reflex to WB Serum   Result Value Ref Range    Lyme Disease Antibodies Serum 0.04 0.00 - 0.89      Comment:      Negative, Absence of detectable Borrelia burdorferi antibodies. A negative   result does not exclude the possibility of Borrelia burgdorferi infection. If   early Lyme disease is suspected, a second sample should  be collected and   tested 2 to 4 weeks later.     Vasculitis panel   Result Value Ref Range    Myeloperoxidase Antibody IgG <0.2 0.0 - 0.9 AI      Comment:      Negative  Antibody index (AI) values reflect qualitative changes in antibody   concentration that cannot be directly associated with clinical condition or   disease state.      Proteinase 3 Antibody IgG <0.2 0.0 - 0.9 AI      Comment:      Negative  Antibody index (AI) values reflect qualitative changes in antibody   concentration that cannot be directly associated with clinical condition or   disease state.     Rheumatoid factor   Result Value Ref Range    Rheumatoid Factor <20 <20 IU/mL   DNA double stranded antibodies   Result Value Ref Range    DNA-ds 2 <10 IU/mL      Comment:      Negative   Cyclic Citrullinated Peptide Antibody IgG   Result Value Ref Range    Cyclic Citrullinated Peptide Antibody, IgG 1 <7 U/mL      Comment:      Negative   Anti Nuclear Yeimy IgG by IFA with Reflex   Result Value Ref Range    ANTONIO interpretation Negative NEG^Negative      Comment:                                         Reference range:  <1:40  NEGATIVE  1:40 - 1:80  BORDERLINE POSITIVE  >1:80 POSITIVE     TSH   Result Value Ref Range    TSH 1.41 0.40 - 4.00 mU/L   CRP, inflammation   Result Value Ref Range    CRP Inflammation <2.9 0.0 - 8.0 mg/L   Centromere Antibody IgG   Result Value Ref Range    Centromere Antibody IgG <0.2 0.0 - 0.9 AI      Comment:      Negative  Antibody index (AI) values reflect qualitative changes in antibody   concentration that cannot be directly associated with clinical condition or   disease state.         If you have any questions or concerns, please call the clinic at the number listed above.       Sincerely,        Carlos Herring MD

## 2018-12-12 NOTE — PROGRESS NOTES
SUBJECTIVE:   Shira Forbes is a 33 year old female who presents to clinic today for the following health issues:    Chief Complaint   Patient presents with     Arthritis     shoulders, wrists, back, hands, ankles and toes     Joint pain for 3 weeks - all joints, joint stiffness mostly in the morning  Numbness and tingling in hands and feet, associated with fatigue   Having lots of fatigue also  Aunt with Lupus and Cousin with RA  No fever, chills, bowel/bladder or other relevant systemic symptoms      Problem list and histories reviewed & adjusted, as indicated.  Additional history: as documented    Patient Active Problem List   Diagnosis     Contact dermatitis and other eczema, due to unspecified cause     Vitamin D deficiency     CARDIOVASCULAR SCREENING; LDL GOAL LESS THAN 160     Malrotation of intestine     S/P appy     H/O left inguinal hernia repair     Anxiety     Past Surgical History:   Procedure Laterality Date     APPENDECTOMY       LAPAROSCOPIC HERNIORRHAPHY INGUINAL  3/28/2012    Procedure:LAPAROSCOPIC HERNIORRHAPHY INGUINAL; Laparoscopic Left Inguinal Herniorrhaphy Possible Right Inguinal Hernia; Surgeon:EMMANUELLE CHAPPELL; Location:[facility]     SURGICAL HISTORY OF -   05/20/06    D&C for incomplete AB       Social History     Tobacco Use     Smoking status: Never Smoker     Smokeless tobacco: Never Used   Substance Use Topics     Alcohol use: Yes     Comment: Occas/quit with pregnancy     Family History   Problem Relation Age of Onset     Psychotic Disorder Maternal Grandmother      Alcohol/Drug Maternal Grandmother         alcohol     Heart Disease Maternal Grandfather         MI     Hypertension Maternal Grandfather      Diabetes Maternal Grandfather      Arthritis Paternal Grandmother         RA     Lipids Paternal Grandmother      Parkinsonism Paternal Grandfather      Heart Disease Paternal Grandfather         pacemaker         Current Outpatient Medications   Medication Sig Dispense  Refill     clobetasol (TEMOVATE) 0.05 % ointment Apply sparingly to affected area twice daily as needed.  Do not apply to face. 60 g 1     triamcinolone (KENALOG) 0.1 % ointment Apply sparingly to affected area three times daily for 14 days. 80 g 1     DiphenhydrAMINE HCl (DIPHENDRYL PO) Take 25 mg by mouth       loratadine (CLARITIN) 10 MG tablet Take 10 mg by mouth daily       Allergies   Allergen Reactions     Keflex [Cephalexin Monohydrate] Rash     Morphine Hives     Shellfish Allergy      Sulfa Drugs Rash     Recent Labs   Lab Test 05/22/17  1612 05/23/16  0956 12/21/14  0847 06/28/13  1032 04/26/12  2220   ALT 16  --  18  --  20   CR 0.67  --  0.63  --  0.80   GFRESTIMATED >90  Non  GFR Calc    --  >90  Non  GFR Calc    --  85   GFRESTBLACK >90   GFR Calc    --  >90   GFR Calc    --  >90   POTASSIUM 3.8  --  3.5  --  3.7   TSH  --  2.17  --  1.89 3.31      BP Readings from Last 3 Encounters:   12/12/18 124/82   07/19/17 (!) 130/93   05/22/17 124/82    Wt Readings from Last 3 Encounters:   07/19/17 62.8 kg (138 lb 6.4 oz)   05/22/17 63 kg (139 lb)   05/23/16 58.1 kg (128 lb)                  Labs reviewed in EPIC    Reviewed and updated as needed this visit by clinical staff       Reviewed and updated as needed this visit by Provider         ROS:  Constitutional, HEENT, cardiovascular, pulmonary, GI, , musculoskeletal, neuro, skin, endocrine and psych systems are negative, except as otherwise noted.    OBJECTIVE:     /82   Pulse 82   Temp 97.2  F (36.2  C) (Tympanic)   Resp 12   LMP 11/28/2018   SpO2 100%   There is no height or weight on file to calculate BMI.  GENERAL: healthy, alert and no distress  EYES: Eyes grossly normal to inspection, PERRL and conjunctivae and sclerae normal  NECK: no adenopathy, no asymmetry, masses, or scars and thyroid normal to palpation  RESP: lungs clear to auscultation - no rales, rhonchi or  wheezes  CV: regular rates and rhythm, normal S1 S2, no S3 or S4 and no murmur, click or rub  MS: mildly tender bilateral wrists and ankle without any swelling or erythema  SKIN: eczematous skin involving hands bilaterally   PSYCH: mentation appears normal, affect normal/bright      ASSESSMENT/PLAN:     1. Contact dermatitis and eczema  -Known to have eczema for long, Kenalog and clobetasol ointment refilled.  Suggested to continue well hydration, regular moisturizers  - triamcinolone (KENALOG) 0.1 % external ointment; Apply sparingly to affected area three times daily for 14 days.  Dispense: 80 g; Refill: 1  - clobetasol (TEMOVATE) 0.05 % external ointment; Apply sparingly to affected area twice daily as needed.  Do not apply to face.  Dispense: 60 g; Refill: 1      2. Multiple joint pain  -Experiencing multiple joint pains for about 3 weeks, family history of SLE and rheumatoid arthritis.  Differentials discussed in detail including metabolic, infectious and rheumatological etiology.  Suggested regular NSAIDs and activity as tolerated.  Comprehensive workup ordered as below and rheumatology referral placed for further review and recommendations.    - CBC with platelets  - Comprehensive metabolic panel (BMP + Alb, Alk Phos, ALT, AST, Total. Bili, TP)  - ESR: Erythrocyte sedimentation rate  - Lyme Disease Yeimy with reflex to WB Serum  - Vasculitis panel  - Rheumatoid factor  - DNA double stranded antibodies  - Cyclic Citrullinated Peptide Antibody IgG  - Anti Nuclear Yeimy IgG by IFA with Reflex  - TSH  - RHEUMATOLOGY REFERRAL  - CRP, inflammation      Patient Instructions       Patient Education     Understanding Contact Dermatitis     A cool, moist compress can help reduce itching.     Contact dermatitis is a common type of skin rash. It s caused by something that touches the skin and makes it irritated and inflamed. It can occur on skin on any part of the body, such as the face, neck, hands, arms, and legs. Contact  dermatitis is not spread from person to person.  Often, the reaction of contact dermatitis occurs 1 to 2 days after contact with the offending agent.  How to say it  JILLIAN-tact [id number]   What causes contact dermatitis?  It s caused by something that irritates the skin, or that creates an allergic reaction on the skin. People can get contact dermatitis from many kinds of things. These include:    Plant oils in poison ivy, oak, and sumac    Chemicals in household , solvents, and glue    Chemicals in makeup, soap, laundry detergent, perfume, acne cream, and hair products    Certain medicines, such as neomycin, bacitracin, benzocaine, and thimerosal    Metals such as nickel, found in some jewelry and watch bands     The sticky material on the back of bandages and tape (adhesive)    Things that can cause tiny breaks in the skin, such as wood, fiberglass, metal tools, and plant thorns    Rubber latex in surgical gloves and other medical supplies  Dermatitis can also be caused by the skin being damp for long periods of time. This can happen from washing your hands too often, or working with wet materials.  Symptoms of contact dermatitis  Symptoms can include skin that is:    Blistered    Burning    Cracked    Dry    Itchy    Painful    Red    Rough, thickened, and leathery    Swollen    Warm  The blisters may ooze fluid and form crusts.  Treatment for contact dermatitis  Treatment is done to help relieve itching and reduce inflammation. The rash should go away in a few days to a few weeks. Treatments include:    Cool, moist compress. Use a clean damp cloth. Put it on the area for 20 to 30 minutes, 5 to 6 times a day for the first 3 days.    Steroid cream or ointment. You can apply this medicine several times a day on clean skin.    Oral corticosteroid. Your healthcare provider may prescribe this medicine if you have severe skin symptoms on a large part of your body.  Your healthcare provider may give you a  steroid injection instead of pills.    Oral antihistamine. This medicine can help reduce itching.    Colloidal oatmeal bath. Soaking in water with colloidal oatmeal can help soothe skin.    Plain cream, lotion, or ointment. Cream, lotion, or ointment without medicine can help to soothe and protect your skin.  Living with contact dermatitis  Talk with your healthcare provider about what may have caused your contact dermatitis. Patch testing may help you figure out what caused the rash so you can avoid further contact with it. Once you learn what caused your rash, make sure to avoid that substance. If your skin comes into contact with it again, make sure to wash your skin right away. If you can t avoid the substance, wear gloves or other protective clothing before you touch it. Or use a cream, lotion, or ointment to protect your skin.  When to call your healthcare provider  Call your healthcare provider right away if you have any of these:    Fever of 100.4 F (38 C) or higher, or as directed    Symptoms that don t get better, or get worse    New symptoms   Date Last Reviewed: 5/1/2016 2000-2018 [employer]. [address] [place] [id number]. All rights reserved. This information is not intended as a substitute for professional medical care. Always follow your healthcare professional's instructions.               Carlos Herring MD  [facility]

## 2018-12-12 NOTE — NURSING NOTE
"Chief Complaint   Patient presents with     Arthritis     shoulders, wrists, back, hands, ankles and toes       Initial /82   Pulse 82   Temp 97.2  F (36.2  C) (Tympanic)   Resp 12   LMP 11/28/2018   SpO2 100%  Estimated body mass index is 26.15 kg/m  as calculated from the following:    Height as of 7/19/17: 1.549 m (5' 1\").    Weight as of 7/19/17: 62.8 kg (138 lb 6.4 oz).    Patient presents to the clinic using No DME    Health Maintenance that is potentially due pending provider review:  Pap - need to change to every 5 years    n/a    Is there anyone who you would like to be able to receive your results? No  If yes have patient fill out KRYSTINA      "

## 2018-12-12 NOTE — PATIENT INSTRUCTIONS
Patient Education     Understanding Contact Dermatitis     A cool, moist compress can help reduce itching.     Contact dermatitis is a common type of skin rash. It s caused by something that touches the skin and makes it irritated and inflamed. It can occur on skin on any part of the body, such as the face, neck, hands, arms, and legs. Contact dermatitis is not spread from person to person.  Often, the reaction of contact dermatitis occurs 1 to 2 days after contact with the offending agent.  How to say it  JILLIAN-tact [id number]   What causes contact dermatitis?  It s caused by something that irritates the skin, or that creates an allergic reaction on the skin. People can get contact dermatitis from many kinds of things. These include:    Plant oils in poison ivy, oak, and sumac    Chemicals in household , solvents, and glue    Chemicals in makeup, soap, laundry detergent, perfume, acne cream, and hair products    Certain medicines, such as neomycin, bacitracin, benzocaine, and thimerosal    Metals such as nickel, found in some jewelry and watch bands     The sticky material on the back of bandages and tape (adhesive)    Things that can cause tiny breaks in the skin, such as wood, fiberglass, metal tools, and plant thorns    Rubber latex in surgical gloves and other medical supplies  Dermatitis can also be caused by the skin being damp for long periods of time. This can happen from washing your hands too often, or working with wet materials.  Symptoms of contact dermatitis  Symptoms can include skin that is:    Blistered    Burning    Cracked    Dry    Itchy    Painful    Red    Rough, thickened, and leathery    Swollen    Warm  The blisters may ooze fluid and form crusts.  Treatment for contact dermatitis  Treatment is done to help relieve itching and reduce inflammation. The rash should go away in a few days to a few weeks. Treatments include:    Cool, moist compress. Use a clean damp cloth. Put it on  the area for 20 to 30 minutes, 5 to 6 times a day for the first 3 days.    Steroid cream or ointment. You can apply this medicine several times a day on clean skin.    Oral corticosteroid. Your healthcare provider may prescribe this medicine if you have severe skin symptoms on a large part of your body.  Your healthcare provider may give you a steroid injection instead of pills.    Oral antihistamine. This medicine can help reduce itching.    Colloidal oatmeal bath. Soaking in water with colloidal oatmeal can help soothe skin.    Plain cream, lotion, or ointment. Cream, lotion, or ointment without medicine can help to soothe and protect your skin.  Living with contact dermatitis  Talk with your healthcare provider about what may have caused your contact dermatitis. Patch testing may help you figure out what caused the rash so you can avoid further contact with it. Once you learn what caused your rash, make sure to avoid that substance. If your skin comes into contact with it again, make sure to wash your skin right away. If you can t avoid the substance, wear gloves or other protective clothing before you touch it. Or use a cream, lotion, or ointment to protect your skin.  When to call your healthcare provider  Call your healthcare provider right away if you have any of these:    Fever of 100.4 F (38 C) or higher, or as directed    Symptoms that don t get better, or get worse    New symptoms   Date Last Reviewed: 5/1/2016 2000-2018 [employer]. [address] [place] [address], [place] [address]. All rights reserved. This information is not intended as a substitute for professional medical care. Always follow your healthcare professional's instructions.

## 2018-12-13 LAB
ANA SER QL IF: NEGATIVE
B BURGDOR IGG+IGM SER QL: 0.04 (ref 0–0.89)
CENTROMERE IGG SER-ACNC: <0.2 AI (ref 0–0.9)
MYELOPEROXIDASE AB SER-ACNC: <0.2 AI (ref 0–0.9)
PROTEINASE3 IGG SER-ACNC: <0.2 AI (ref 0–0.9)
RHEUMATOID FACT SER NEPH-ACNC: <20 IU/ML (ref 0–20)

## 2018-12-14 LAB
CCP AB SER IA-ACNC: 1 U/ML
DSDNA AB SER-ACNC: 2 IU/ML

## 2019-05-01 ENCOUNTER — OFFICE VISIT (OUTPATIENT)
Dept: RHEUMATOLOGY | Facility: CLINIC | Age: 34
End: 2019-05-01
Payer: COMMERCIAL

## 2019-05-01 ENCOUNTER — ANCILLARY PROCEDURE (OUTPATIENT)
Dept: GENERAL RADIOLOGY | Facility: CLINIC | Age: 34
End: 2019-05-01
Attending: INTERNAL MEDICINE
Payer: COMMERCIAL

## 2019-05-01 VITALS
HEIGHT: 61 IN | DIASTOLIC BLOOD PRESSURE: 82 MMHG | BODY MASS INDEX: 27.11 KG/M2 | HEART RATE: 85 BPM | OXYGEN SATURATION: 100 % | WEIGHT: 143.6 LBS | SYSTOLIC BLOOD PRESSURE: 138 MMHG

## 2019-05-01 DIAGNOSIS — M06.4 INFLAMMATORY POLYARTHROPATHY (H): ICD-10-CM

## 2019-05-01 DIAGNOSIS — M47.819 SPONDYLOARTHROPATHY: Primary | ICD-10-CM

## 2019-05-01 PROCEDURE — 71046 X-RAY EXAM CHEST 2 VIEWS: CPT

## 2019-05-01 PROCEDURE — 73130 X-RAY EXAM OF HAND: CPT | Mod: RT

## 2019-05-01 PROCEDURE — 99204 OFFICE O/P NEW MOD 45 MIN: CPT | Performed by: INTERNAL MEDICINE

## 2019-05-01 PROCEDURE — 72200 X-RAY EXAM SI JOINTS: CPT

## 2019-05-01 ASSESSMENT — MIFFLIN-ST. JEOR: SCORE: 1283.75

## 2019-05-01 NOTE — PROGRESS NOTES
Rheumatology Clinic Visit      Shira Forbes MRN# [id number]   YOB: 1985 Age: 34 year old      Date of visit: 5/01/19   Referring provider: Dr. Carlos Herring  PCP: Clinic, [facility]    Chief Complaint   Patient presents with:  Consult: Psoriasis? on hands. Joint pain that moves around and fatigue.      Assessment and Plan     1.  Inflammatory arthritis: Asymmetric arthritis involving her MCPs and PIP is suggestive of a spondyloarthropathy / psoriatic arthritis; prolonged morning stiffness; and pain and stiffness that improved with activity and worsened with inactivity.  She has dry skin on her hands that has an appearance more consistent with eczema than psoriasis.  Possible nail pitting on one fingernail.  RF, CCP, ANTONIO, dsDNA, PR3, and MPO negative; no cytopenias; normal renal function, ESR, and CRP.  Check x-ray today: SI joints suggest sacroiliitis and are consistent with inflammatory back pain.  Hand and chest x-rays were okay.  Therefore, plan to start Humira (note that we also discussed MTX in clinic, but given axial involvement will use a TNF inhitibor).  Labs will be needed and this is communicated with Ms. Forbes via NeedFeed (since results returned after she left)  - Start Humira 40mg SQ every 14 days if labs are okay  - Labs: hepatitis B core ab, hepatitis C, quantiferon TB gold plus    # Adalimumab (Humira) Risks and Benefits: The risks and benefits of adalimumab were discussed in detail and the patient verbalized understanding.  The risks discussed include, but are not limited to, the risk for hypersensitivity, anaphylaxis, anaphylactoid reactions, an increased risk for serious infections leading to hospitalization or death, a possible increased risk for lymphoma and other malignancies, a possible worsening of demyelinating diseases, a possible worsening of heart failure, risk for cytopenias, risk for drug induced lupus, possible reactivation of hepatitis B, and possible  reactivation of latent tuberculosis.  Subcutaneous injections may result in injection site reactions and/or pain at the site of injection.  The most common adverse reactions are infections, injection site reactions, headache, and rash.  It was discussed that the medication would need to be discontinued if a serious infection develops.  It was discussed that live vaccinations should not be received while using adalimumab or within 30 days prior to starting adalimumab.  I encouraged reviewing the package insert and asking any questions about the medication.      # Methotrexate Risks and Benefits: The risks and benefits of methotrexate were discussed in detail and the patient verbalized understanding.  The risks discussed include, but are not limited to, the risk for hypersensitivity, anaphylaxis, anaphylactoid reactions, infections, bone marrow suppression, renal toxicity, hepatotoxicity, pulmonary toxicity, malignancy, impaired fertility, GI upset, alopecia, and oral and nasal sores.  Folic acid supplementation is recommended during methotrexate therapy to help prevent some of the side effects. Pregnancy prevention and planning was discussed; it is recommended that women of childbearing potential use reliable contraception during therapy.  The risks of taking both methotrexate and alcohol were reviewed; complete alcohol avoidance was discussed.  Routine laboratory monitoring is required during methotrexate therapy. Taking MTX once weekly, all within a 24 hour period was stressed and the patient verbalized this instruction back to me.  I encouraged reviewing the package insert and asking any questions about the medication.    # Pregnancy planning: she plans to discuss tubal ligation with GYN soon    Ms. Forbes verbalized agreement with and understanding of the rational for the diagnosis and treatment plan.  All questions were answered to best of my ability and the patient's satisfaction. Ms. Forbes was advised to  contact the clinic with any questions that may arise after the clinic visit.      Thank you for involving me in the care of the patient    Return to clinic: 3-4 months      HPI   Shira Forbes is a 34 year old female with a past medical history significant for contact dermatitis, vitamin D deficiency, and axiety who is seen in consultation at the request of Dr. Carlos Herring for evaluation of multiple joint pain.    Today, Ms. Forbes reports starting fall 2018 she began having joint pain involving her wrists, fingers, toes, ankles, and lower back.  Pain has been the same since onset.  Pain is worse in the morning.  Morning stiffness for 30-45 minutes that is better after she goes to the gym.  Morning stiffness lasts longer if she does not work out in the morning.  Positive gelling phenomenon.  Joints of the hand specifically involved include the right fifth PIP and the second-fourth MCPs; she says that the same joints of the left hand are also involved but to a lesser extent.  She has eczema involving her hands so she wears gloves when she works.  Toe swelling in the past.  Question of psoriasis.  Fatigue; she has a 3-1/2-year-old at home and her  who snores and his snoring wakes her up at night; she sleeps about 6-8 hours per night and wakes up feeling tired.      Denies fevers, chills, nausea, vomiting, constipation, diarrhea. No abdominal pain. No chest pain/pressure, palpitations, or shortness of breath. No LE swelling. No neck pain. No oral or nasal sores.  No sicca symptoms. No photosensitivity or photophobia. No eye pain or redness. No history of inflammatory eye disease. No history of inflammatory bowel disease.  No history of DVT, pulmonary embolism, or miscarriage.   No history of serositis.  No history of Raynaud's Phenomenon.  No seizure history.  No known renal disorder.      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[employer]    GEN: No fevers, chills, night sweats, or weight change  SKIN: See HPI  HEENT: No epistaxis. No oral or nasal ulcers.  CV: No chest pain, pressure, palpitations, or dyspnea on exertion.  PULM: No SOB, wheeze, cough.  GI: No nausea, vomiting, constipation, diarrhea. No blood in stool. No abdominal pain.  : No blood in urine.  MSK: See HPI.  NEURO: No numbness or tingling  ENDO: No heat/cold intolerance.  EXT: No LE swelling  PSYCH: Negative    Active Problem List     Patient Active Problem List   Diagnosis     Contact dermatitis and other eczema, due to unspecified cause     Vitamin D deficiency     CARDIOVASCULAR SCREENING; LDL GOAL LESS THAN 160     Malrotation of intestine     S/P appy     H/O left inguinal hernia repair     Anxiety     Past Medical History     Past Medical History:   Diagnosis Date     Chickenpox      PONV (postoperative nausea and vomiting)      Past Surgical History     Past Surgical History:   Procedure Laterality Date     APPENDECTOMY       LAPAROSCOPIC HERNIORRHAPHY INGUINAL  3/28/2012    Procedure:LAPAROSCOPIC HERNIORRHAPHY INGUINAL; Laparoscopic Left Inguinal Herniorrhaphy Possible Right Inguinal Hernia; Surgeon:EMMANUELLE CHAPPELL; Location:[place] OR     SURGICAL HISTORY OF -   05/20/06    D&C for incomplete AB     Allergy     Allergies   Allergen Reactions     Keflex [Cephalexin Monohydrate] Rash     Morphine Hives     Shellfish Allergy      Sulfa Drugs Rash     Current Medication List     Current Outpatient Medications   Medication Sig     Cholecalciferol (D3 VITAMIN PO)      clobetasol (TEMOVATE) 0.05 % external ointment Apply sparingly to affected area twice daily as needed.  Do not apply to face.     IBUPROFEN PO      Omega-3 Fatty Acids (FISH OIL PO)      triamcinolone (KENALOG) 0.1 % external ointment Apply sparingly to affected area three times daily for 14 days.     DiphenhydrAMINE HCl (DIPHENDRYL PO) Take 25 mg by mouth     loratadine (CLARITIN) 10 MG tablet Take 10 mg by mouth  "daily     No current facility-administered medications for this visit.          Social History   See HPI    Family History     Family History   Problem Relation Age of Onset     Psychotic Disorder Maternal Grandmother      Alcohol/Drug Maternal Grandmother         alcohol     Heart Disease Maternal Grandfather         MI     Hypertension Maternal Grandfather      Diabetes Maternal Grandfather      Arthritis Paternal Grandmother         RA     Lipids Paternal Grandmother      Parkinsonism Paternal Grandfather      Heart Disease Paternal Grandfather         pacemaker     Lymphoma Cousin      Breast Cancer Maternal Aunt      Paternal grandmother: RA  Paternal aunt: lupus  Paternal cousin: RA    Physical Exam     Temp Readings from Last 3 Encounters:   12/12/18 97.2  F (36.2  C) (Tympanic)   07/19/17 98.7  F (37.1  C) (Tympanic)   05/22/17 99.3  F (37.4  C) (Tympanic)     BP Readings from Last 5 Encounters:   05/01/19 138/82   12/12/18 124/82   07/19/17 (!) 130/93   05/22/17 124/82   05/23/16 126/87     Pulse Readings from Last 1 Encounters:   05/01/19 85     Resp Readings from Last 1 Encounters:   12/12/18 12     Estimated body mass index is 27.13 kg/m  as calculated from the following:    Height as of this encounter: 1.549 m (5' 1\").    Weight as of this encounter: 65.1 kg (143 lb 9.6 oz).    GEN: NAD  HEENT: MMM. No oral lesions. Anicteric, noninjected sclera  CV: S1, S2. RRR. No m/r/g.  PULM: CTA bilaterally. No w/c.  ABD: +BS.   MSK: Subtle synovial swelling of the right second-fourth MCPs and right fifth PIP.  Left third MCP tender to palpation but without swelling.  Wrists without swelling or tenderness palpation.  Elbows, shoulders, and knees without swelling or tenderness palpation.  Ankles tender to palpation but without swelling or increased warmth.  MTPs nontender to palpation, but positive MCP squeeze bilaterally.  Achilles tendons nontender to palpation.  No dactylitis.     NEURO: UE and LE strengths 5/5 " and equal bilaterally.   SKIN: Possible nail pitting on one fingernail.  Very dry skin on the dorsal aspect of her hands with cracking skin and dry blood  EXT: No LE edema  PSYCH: Alert. Appropriate.    Labs / Imaging (select studies)     RF/CCP  Recent Labs   Lab Test 12/12/18  0920   CCPIGG 1   RHF <20     ANTONIO  Recent Labs   Lab Test 12/12/18 0920   ALANNA Negative     RNP/Sm/SSA/SSB  Recent Labs   Lab Test 01/06/15  1051   TREPAB Negative     dsDNA  Recent Labs   Lab Test 12/12/18 0920   DNA 2     C3/C4  Recent Labs   Lab Test 07/19/17  1154   Z4QABBA 22     ANCA  Recent Labs   Lab Test 12/12/18 0920   PR3IGG <0.2   MPOIGG <0.2     CBC  Recent Labs   Lab Test 12/12/18  0920 05/22/17  1612 04/21/15  0744 01/06/15  1051 12/21/14  0847  04/26/12  2220   WBC 7.3 9.9  --  9.2 7.5  --  5.9   RBC 4.61 5.05  --  4.51 4.77  --  4.59   HGB 14.2 15.6 11.7 13.6 14.1   < > 13.5   HCT 42.3 44.2  --  40.3 41.5  --  40.7   MCV 92 88  --  89 87  --  89   RDW 13.0 13.0  --  12.5 12.3  --  12.4    270  --  252 282  --  247   MCH 30.8 30.9  --  30.2 29.6  --  29.4   MCHC 33.6 35.3  --  33.7 34.0  --  33.2   NEUTROPHIL  --   --   --   --  68.2  --  46.8   LYMPH  --   --   --   --  22.2  --  37.2   MONOCYTE  --   --   --   --  6.4  --  9.9   EOSINOPHIL  --   --   --   --  2.5  --  5.4   BASOPHIL  --   --   --   --  0.4  --  0.5   ANEU  --   --   --   --  5.1  --  2.8   ALYM  --   --   --   --  1.7  --  2.2   LAMONT  --   --   --   --  0.5  --  0.6   AEOS  --   --   --   --  0.2  --  0.3   ABAS  --   --   --   --  0.0  --  0.0    < > = values in this interval not displayed.     CMP  Recent Labs   Lab Test 12/12/18  0920 05/22/17  1612 12/21/14  0847 04/26/12  2220    136 137 142   POTASSIUM 4.0 3.8 3.5 3.7   CHLORIDE 106 105 107 106   CO2 27 22 24 26   ANIONGAP 5 9 6 10   GLC 88 100* 90 99   BUN 11 12 9 12   CR 0.70 0.67 0.63 0.80   GFRESTIMATED >90 >90  Non  GFR Calc   >90  Non  GFR Calc    85   GFRESTBLACK >90 >90   GFR Calc   >90   GFR Calc   >90   BERNARDO 8.6 8.7 8.7 9.1   BILITOTAL 0.3 0.2 0.3 0.3   ALBUMIN 4.1 4.1 3.8 4.4   PROTTOTAL 7.3 7.6 7.5 7.4   ALKPHOS 44 47 46 59   AST 10 14 7 24   ALT 21 16 18 20     Calcium/VitaminD  Recent Labs   Lab Test 12/12/18  0920 05/22/17  1612 12/21/14  0847   BERNARDO 8.6 8.7 8.7     ESR/CRP  Recent Labs   Lab Test 12/12/18  0920 07/19/17  1154   SED 4  --    CRP <2.9 <2.9     TSH/T4  Recent Labs   Lab Test 12/12/18  0920 05/23/16  0956 06/28/13  1032   TSH 1.41 2.17 1.89     Hepatitis B  Recent Labs   Lab Test 01/06/15  1051   HEPBANG Nonreactive     Lyme ab screening  Recent Labs   Lab Test 12/12/18  0920   LYMEGM 0.04     HIV Screening  Recent Labs   Lab Test 01/06/15  1051   HIAGAB Nonreactive   HIV-1 p24 Ag & HIV-1/HIV-2 Ab Not Detected       UA  Recent Labs   Lab Test 01/06/15  1008 12/21/14  1032 04/26/12  2243   COLOR Yellow Light Yellow Light Yellow   APPEARANCE Clear Clear Clear   URINEGLC Negative Negative Negative   URINEBILI Negative Negative Negative   SG >1.030 1.012 1.006   URINEPH 6.0 7.0 7.0   PROTEIN Negative Negative Negative   UROBILINOGEN 0.2  --   --    NITRITE Negative Negative Negative   UBLD Negative Negative Trace*   LEUKEST Negative Negative Negative   WBCU  --   --  3*   RBCU  --   --  1   MUCOUS  --   --  Present*     Urine Microscopic  Recent Labs   Lab Test 04/26/12  2243   WBCU 3*   RBCU 1   MUCOUS Present*     Urine Protein  Recent Labs   Lab Test 07/28/15  1336   UTP 0.13   UTPG 0.22*   UCRR 61       Immunization History     Immunization History   Administered Date(s) Administered     Influenza (IIV3) PF 10/15/2008, 10/19/2012     Influenza Vaccine IM 3yrs+ 4 Valent IIV4 09/29/2015, 09/26/2016     TD (ADULT, 7+) 02/01/2004     TDAP Vaccine (Adacel) 05/26/2015          Chart documentation done in part with Dragon Voice recognition Software. Although reviewed after completion, some word and grammatical  error may remain.    Ronnie Cabrera MD

## 2019-05-01 NOTE — NURSING NOTE
RAPID3 (0-30) Cumulative Score  10.7          RAPID3 Weighted Score (divide #4 by 3 and that is the weighted score)  3.6       Samaria Ye [facility]  5/1/2019 10:53 AM

## 2019-05-01 NOTE — Clinical Note
Dr. Herring,Ms. Forbes has a spondyloarthropathy; I plan to start Humira if infection screening is negative.Thanks!Ronnie

## 2019-05-02 DIAGNOSIS — M47.819 SPONDYLOARTHROPATHY: ICD-10-CM

## 2019-05-02 PROCEDURE — 86704 HEP B CORE ANTIBODY TOTAL: CPT | Performed by: FAMILY MEDICINE

## 2019-05-02 PROCEDURE — 86481 TB AG RESPONSE T-CELL SUSP: CPT | Performed by: FAMILY MEDICINE

## 2019-05-02 PROCEDURE — 86803 HEPATITIS C AB TEST: CPT | Performed by: FAMILY MEDICINE

## 2019-05-02 PROCEDURE — 36415 COLL VENOUS BLD VENIPUNCTURE: CPT | Performed by: FAMILY MEDICINE

## 2019-05-03 LAB
HBV CORE AB SERPL QL IA: NONREACTIVE
HCV AB SERPL QL IA: NONREACTIVE

## 2019-05-05 LAB
GAMMA INTERFERON BACKGROUND BLD IA-ACNC: 0.03 IU/ML
M TB IFN-G BLD-IMP: NEGATIVE
M TB IFN-G CD4+ BCKGRND COR BLD-ACNC: >10 IU/ML
MITOGEN IGNF BCKGRD COR BLD-ACNC: 0 IU/ML
MITOGEN IGNF BCKGRD COR BLD-ACNC: 0 IU/ML

## 2019-05-08 ENCOUNTER — OFFICE VISIT (OUTPATIENT)
Dept: OBGYN | Facility: CLINIC | Age: 34
End: 2019-05-08
Payer: COMMERCIAL

## 2019-05-08 ENCOUNTER — TELEPHONE (OUTPATIENT)
Dept: OBGYN | Facility: CLINIC | Age: 34
End: 2019-05-08

## 2019-05-08 VITALS
BODY MASS INDEX: 27.19 KG/M2 | WEIGHT: 144 LBS | HEIGHT: 61 IN | TEMPERATURE: 97.6 F | DIASTOLIC BLOOD PRESSURE: 84 MMHG | SYSTOLIC BLOOD PRESSURE: 127 MMHG | RESPIRATION RATE: 16 BRPM | HEART RATE: 73 BPM

## 2019-05-08 DIAGNOSIS — N92.0 MENORRHAGIA WITH REGULAR CYCLE: Primary | ICD-10-CM

## 2019-05-08 DIAGNOSIS — Z01.818 PREOP GENERAL PHYSICAL EXAM: ICD-10-CM

## 2019-05-08 DIAGNOSIS — Z01.419 ENCOUNTER FOR GYNECOLOGICAL EXAMINATION WITHOUT ABNORMAL FINDING: ICD-10-CM

## 2019-05-08 DIAGNOSIS — Z30.2 ENCOUNTER FOR STERILIZATION: ICD-10-CM

## 2019-05-08 DIAGNOSIS — M47.819 SPONDYLARTHRITIS: ICD-10-CM

## 2019-05-08 PROCEDURE — 90670 PCV13 VACCINE IM: CPT | Performed by: OBSTETRICS & GYNECOLOGY

## 2019-05-08 PROCEDURE — 90471 IMMUNIZATION ADMIN: CPT | Performed by: OBSTETRICS & GYNECOLOGY

## 2019-05-08 PROCEDURE — 87624 HPV HI-RISK TYP POOLED RSLT: CPT | Performed by: OBSTETRICS & GYNECOLOGY

## 2019-05-08 PROCEDURE — 99214 OFFICE O/P EST MOD 30 MIN: CPT | Mod: 25 | Performed by: OBSTETRICS & GYNECOLOGY

## 2019-05-08 PROCEDURE — G0145 SCR C/V CYTO,THINLAYER,RESCR: HCPCS | Performed by: OBSTETRICS & GYNECOLOGY

## 2019-05-08 PROCEDURE — 99385 PREV VISIT NEW AGE 18-39: CPT | Mod: 25 | Performed by: OBSTETRICS & GYNECOLOGY

## 2019-05-08 ASSESSMENT — MIFFLIN-ST. JEOR: SCORE: 1281.59

## 2019-05-08 NOTE — PROGRESS NOTES
SUBJECTIVE:   CC: Shira Forbes is an 34 year old woman who presents for preventive health visit.     Healthy Habits:    Do you get at least three servings of calcium containing foods daily (dairy, green leafy vegetables, etc.)? yes    Amount of exercise or daily activities, outside of work: 6 day(s) per week    Problems taking medications regularly No    Medication side effects: No    Have you had an eye exam in the past two years? no    Do you see a dentist twice per year? yes    Do you have sleep apnea, excessive snoring or daytime drowsiness?yes      Discuss heavy bleeding  She is interested in a tubal ligation and Endometrial Ablation     Today's PHQ-2 Score:   PHQ-2 ( 1999 Pfizer) 5/8/2019 5/22/2017   Q1: Little interest or pleasure in doing things 1 0   Q2: Feeling down, depressed or hopeless 0 0   PHQ-2 Score 1 0       Abuse: Current or Past(Physical, Sexual or Emotional)- No  Do you feel safe in your environment? Yes    Social History     Tobacco Use     Smoking status: Never Smoker     Smokeless tobacco: Never Used   Substance Use Topics     Alcohol use: Yes     Comment: Occas/quit with pregnancy     If you drink alcohol do you typically have >3 drinks per day or >7 drinks per week? No                     Reviewed orders with patient.  Reviewed health maintenance and updated orders accordingly - Yes  Patient Active Problem List   Diagnosis     Contact dermatitis and other eczema, due to unspecified cause     Vitamin D deficiency     CARDIOVASCULAR SCREENING; LDL GOAL LESS THAN 160     Malrotation of intestine     S/P appy     H/O left inguinal hernia repair     Anxiety     Past Surgical History:   Procedure Laterality Date     APPENDECTOMY       LAPAROSCOPIC HERNIORRHAPHY INGUINAL  3/28/2012    Procedure:LAPAROSCOPIC HERNIORRHAPHY INGUINAL; Laparoscopic Left Inguinal Herniorrhaphy Possible Right Inguinal Hernia; Surgeon:EMMANUELLE CHAPPELL; Location:[place] OR     SURGICAL HISTORY OF -   05/20/06    D&C  for incomplete AB       Social History     Tobacco Use     Smoking status: Never Smoker     Smokeless tobacco: Never Used   Substance Use Topics     Alcohol use: Yes     Comment: Occas/quit with pregnancy     Family History   Problem Relation Age of Onset     Psychotic Disorder Maternal Grandmother      Alcohol/Drug Maternal Grandmother         alcohol     Heart Disease Maternal Grandfather         MI     Hypertension Maternal Grandfather      Diabetes Maternal Grandfather      Arthritis Paternal Grandmother         RA     Lipids Paternal Grandmother      Parkinsonism Paternal Grandfather      Heart Disease Paternal Grandfather         pacemaker     Lymphoma Cousin      Breast Cancer Maternal Aunt          Current Outpatient Medications   Medication Sig Dispense Refill     Cholecalciferol (D3 VITAMIN PO)        clobetasol (TEMOVATE) 0.05 % external ointment Apply sparingly to affected area twice daily as needed.  Do not apply to face. 60 g 1     DiphenhydrAMINE HCl (DIPHENDRYL PO) Take 25 mg by mouth nightly as needed        IBUPROFEN PO        loratadine (CLARITIN) 10 MG tablet Take 10 mg by mouth daily       Omega-3 Fatty Acids (FISH OIL PO)        triamcinolone (KENALOG) 0.1 % external ointment Apply sparingly to affected area three times daily for 14 days. 80 g 1     Allergies   Allergen Reactions     Keflex [Cephalexin Monohydrate] Rash     Morphine Hives     Shellfish Allergy      Sulfa Drugs Rash       Mammogram not appropriate for this patient based on age.    Pertinent mammograms are reviewed under the imaging tab.  History of abnormal Pap smear: NO - age 30- 65 PAP every 3 years recommended  PAP / HPV Latest Ref Rng & Units 9/29/2015 10/30/2012 8/7/2009   PAP - NIL NIL NIL   HPV 16 DNA NEG Negative - -   HPV 18 DNA NEG Negative - -   OTHER HR HPV NEG Negative - -     Reviewed and updated as needed this visit by clinical staff  Tobacco  Allergies  Meds  Med Hx  Surg Hx  Fam Hx  Soc Hx        Reviewed  and updated as needed this visit by Provider        Past Medical History:   Diagnosis Date     Chickenpox      PONV (postoperative nausea and vomiting)       Past Surgical History:   Procedure Laterality Date     APPENDECTOMY       LAPAROSCOPIC HERNIORRHAPHY INGUINAL  3/28/2012    Procedure:LAPAROSCOPIC HERNIORRHAPHY INGUINAL; Laparoscopic Left Inguinal Herniorrhaphy Possible Right Inguinal Hernia; Surgeon:EMMANUELLE CHAPPELL; Location:[place] [facility]     SURGICAL HISTORY OF -   06    D&C for incomplete AB     OB History    Para Term  AB Living   3 2 2 0 1 2   SAB TAB Ectopic Multiple Live Births   1 0 0 0 2      # Outcome Date GA Lbr Taye/2nd Weight Sex Delivery Anes PTL Lv   3 Term 08/04/15 39w1d 01:30 / 00:54 3.26 kg (7 lb 3 oz) F Vag-Spont EPI N NATE      Name: Brandie      Apgar1: 5  Apgar5: 8   2 Term 09 38w0d  3.115 kg (6 lb 13.9 oz) M Vag-Vacuum EPI N NATE      Birth Comments: See operative note      Name: Jace      Apgar1: 3  Apgar5: 8   1 SAB 06 12w0d    AB, MISSED         Birth Comments: D&C       ROS:  CONSTITUTIONAL: NEGATIVE for fever, chills, change in weight  INTEGUMENTARU/SKIN: NEGATIVE for worrisome rashes, moles or lesions  EYES: NEGATIVE for vision changes or irritation  ENT: NEGATIVE for ear, mouth and throat problems  RESP: NEGATIVE for significant cough or SOB  BREAST: NEGATIVE for masses, tenderness or discharge  CV: NEGATIVE for chest pain, palpitations or peripheral edema  GI: NEGATIVE for nausea, abdominal pain, heartburn, or change in bowel habits   female: menses: frequency: every 21-24 days and lasting 7 days   Heavy for 2 days needing pads + Tampon and suffering extreme fatigue  MUSCULOSKELETAL: NEGATIVE for significant arthralgias or myalgia  NEURO: NEGATIVE for weakness, dizziness or paresthesias  ENDOCRINE: NEGATIVE for temperature intolerance, skin/hair changes  HEME/ALLERGY/IMMUNE: NEGATIVE for bleeding problems  PSYCHIATRIC: NEGATIVE for changes in mood or  "affect    OBJECTIVE:   /84   Pulse 73   Temp 97.6  F (36.4  C)   Resp 16   Ht 1.543 m (5' 0.75\")   Wt 65.3 kg (144 lb)   LMP 05/01/2019   BMI 27.43 kg/m    EXAM:  GENERAL: healthy, alert and no distress  EYES: Eyes grossly normal to inspection, PERRL and conjunctivae and sclerae normal  NECK: no adenopathy, no asymmetry, masses, or scars and thyroid normal to palpation  RESP: lungs clear to auscultation - no rales, rhonchi or wheezes  BREAST: normal without masses, tenderness or nipple discharge and no palpable axillary masses or adenopathy  CV: regular rate and rhythm, normal S1 S2, no S3 or S4, no murmur, click or rub, no peripheral edema and peripheral pulses strong  ABDOMEN: soft, nontender, no hepatosplenomegaly, no masses and bowel sounds normal   (female): normal female external genitalia,   normal urethral meatus,   vaginal mucosa pink, moist, well rugated,   normal cervix/adnexa/uterus without masses or discharge  MS: no gross musculoskeletal defects noted, no edema  SKIN: no suspicious lesions or rashes  NEURO: Normal strength and tone, mentation intact and speech normal  PSYCH: mentation appears normal, affect normal/bright  LYMPH: no cervical, supraclavicular, axillary, or inguinal adenopathy    Diagnostic Test Results:  none     ASSESSMENT/PLAN:   1. Menorrhagia with regular cycle  Heavy after removing her IUD that was not well tolerated  She is having worsening joint paion during her periods, has Inflammatory arthritis and plans to start Humira after her procedure  - Stacy-Operative Worksheet GYN  Laparoscopic tubal ligation, hysteroscopy, D&C and Endometrial Ablation   2. Encounter for sterilization  Permanent nature discussed   Vasectomy promised ...........but no action in sight    3. Encounter for gynecological examination without abnormal finding    - Pap imaged thin layer screen with HPV - recommended age 30 - 65 years (select HPV order below)  - HPV High Risk Types DNA " "Cervical    4. Spondylarthritis    - VACCINE ADMINISTRATION, INITIAL    COUNSELING:   Reviewed preventive health counseling, as reflected in patient instructions       Regular exercise       Healthy diet/nutrition    BP Readings from Last 1 Encounters:   05/08/19 127/84     Estimated body mass index is 27.43 kg/m  as calculated from the following:    Height as of this encounter: 1.543 m (5' 0.75\").    Weight as of this encounter: 65.3 kg (144 lb).           reports that she has never smoked. She has never used smokeless tobacco.      I described the procedures as indicated above. The types of anesthesia were reviewed. The pre and post-op expectations were noted. We discussed the risks and benefits of the above procedures. The risk of bleeding, infection and damage to other organs was reviewed. The possibility of much larger incision(s) was discussed.  No guarantees were made.  She did express understanding and desires to proceed with surgery.      Counseling Resources:  ATP IV Guidelines  Pooled Cohorts Equation Calculator  Breast Cancer Risk Calculator  FRAX Risk Assessment  ICSI Preventive Guidelines  Dietary Guidelines for Americans, 2010  USDA's MyPlate  ASA Prophylaxis  Lung CA Screening    Mc Purvis MD  [facility]  "

## 2019-05-08 NOTE — PROGRESS NOTES
[facility]  [address] [facility] - [place], [place] [address]  [phone]  Dept: [phone]    PRE-OP EVALUATION:  Today's date: 2019    Shira Forbes (: 1985) presents for pre-operative evaluation assessment as requested by Dr. Ornelas.  She requires evaluation and anesthesia risk assessment prior to undergoing surgery/procedure for treatment of HEAVY BLEEDING .    Proposed Surgery/ Procedure: N/A  Date of Surgery/ Procedure: N/A  Time of Surgery/ Procedure: N/A  Hospital/Surgical Facility: [facility]    Primary Physician: [facility]  Type of Anesthesia Anticipated: General    Patient has a Health Care Directive or Living Will:  NO    1. NO - Do you have a history of heart attack, stroke, stent, bypass or surgery on an artery in the head, neck, heart or legs?  2. NO - Do you ever have any pain or discomfort in your chest?  3. NO - Do you have a history of  Heart Failure?  4. NO - Are you troubled by shortness of breath when: walking on the level, up a slight hill or at night?  5. NO - Do you currently have a cold, bronchitis or other respiratory infection?  6. NO - Do you have a cough, shortness of breath or wheezing?  7. NO - Do you sometimes get pains in the calves of your legs when you walk?  8. NO - Do you or anyone in your family have previous history of blood clots?  9. NO - Do you or does anyone in your family have a serious bleeding problem such as prolonged bleeding following surgeries or cuts?  10. NO - Have you ever had problems with anemia or been told to take iron pills?  11. YES - Have you had any abnormal blood loss such as black, tarry or bloody stools, or abnormal vaginal bleeding?  12. NO - Have you ever had a blood transfusion?  13. NO - Have you or any of your relatives ever had problems with anesthesia?  14. NO - Do you have sleep apnea, excessive snoring or daytime drowsiness?  15. NO - Do you have any prosthetic heart valves?  16. NO - Do you have  prosthetic joints?  17. NO - Is there any chance that you may be pregnant?      HPI:     HPI related to upcoming procedure: heavy periods and undesired fertility      See problem list for active medical problems.  Problems all longstanding and stable, except as noted/documented.  See ROS for pertinent symptoms related to these conditions.                                                                                                                                                          .    MEDICAL HISTORY:     Patient Active Problem List    Diagnosis Date Noted     Anxiety 05/23/2016     Priority: Medium     Malrotation of intestine 10/31/2012     Priority: Medium     Identified on ct          S/P appy 10/31/2012     Priority: Medium     H/O left inguinal hernia repair 10/31/2012     Priority: Medium     CARDIOVASCULAR SCREENING; LDL GOAL LESS THAN 160 10/31/2010     Priority: Medium     Vitamin D deficiency 03/04/2009     Priority: Medium     Contact dermatitis and other eczema, due to unspecified cause 08/16/2007     Priority: Medium      Past Medical History:   Diagnosis Date     Chickenpox      PONV (postoperative nausea and vomiting)      Past Surgical History:   Procedure Laterality Date     APPENDECTOMY       LAPAROSCOPIC HERNIORRHAPHY INGUINAL  3/28/2012    Procedure:LAPAROSCOPIC HERNIORRHAPHY INGUINAL; Laparoscopic Left Inguinal Herniorrhaphy Possible Right Inguinal Hernia; Surgeon:EMMANUELLE CHAPPELL; Location:[place] OR     SURGICAL HISTORY OF -   05/20/06    D&C for incomplete AB     Current Outpatient Medications   Medication Sig Dispense Refill     Cholecalciferol (D3 VITAMIN PO)        clobetasol (TEMOVATE) 0.05 % external ointment Apply sparingly to affected area twice daily as needed.  Do not apply to face. 60 g 1     DiphenhydrAMINE HCl (DIPHENDRYL PO) Take 25 mg by mouth nightly as needed        IBUPROFEN PO        loratadine (CLARITIN) 10 MG tablet Take 10 mg by mouth daily       Omega-3 Fatty  "Acids (FISH OIL PO)        triamcinolone (KENALOG) 0.1 % external ointment Apply sparingly to affected area three times daily for 14 days. 80 g 1     OTC products: None, except as noted above    Allergies   Allergen Reactions     Keflex [Cephalexin Monohydrate] Rash     Morphine Hives     Shellfish Allergy      Sulfa Drugs Rash      Latex Allergy: NO    Social History     Tobacco Use     Smoking status: Never Smoker     Smokeless tobacco: Never Used   Substance Use Topics     Alcohol use: Yes     Comment: Occas/quit with pregnancy     History   Drug Use No       REVIEW OF SYSTEMS:   Constitutional, neuro, ENT, endocrine, pulmonary, cardiac, gastrointestinal, genitourinary, musculoskeletal, integument and psychiatric systems are negative, except as otherwise noted.    EXAM:   /84   Pulse 73   Temp 97.6  F (36.4  C)   Resp 16   Ht 1.543 m (5' 0.75\")   Wt 65.3 kg (144 lb)   LMP 05/01/2019   BMI 27.43 kg/m    See other note    DIAGNOSTICS:   No labs or EKG required for low risk surgery (cataract, skin procedure, breast biopsy, etc)    Recent Labs   Lab Test 12/12/18  0920 05/22/17  1612   HGB 14.2 15.6    270    136   POTASSIUM 4.0 3.8   CR 0.70 0.67        IMPRESSION:   Reason for surgery/procedure: heavy bleeding and undesired fertility    The proposed surgical procedure is considered LOW risk.    REVISED CARDIAC RISK INDEX  The patient has the following serious cardiovascular risks for perioperative complications such as (MI, PE, VFib and 3  AV Block):  No serious cardiac risks  INTERPRETATION: 0 risks: Class I (very low risk - 0.4% complication rate)    The patient has the following additional risks for perioperative complications:  No identified additional risks      ICD-10-CM    1. Menorrhagia with regular cycle N92.0 Stacy-Operative Worksheet GYN   2. Encounter for sterilization Z30.2    3. 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    APPROVAL GIVEN to proceed with proposed procedure, without further diagnostic evaluation       Signed Electronically by: Mc Purvis MD    Copy of this evaluation report is provided to requesting physician.    [place] Preop Guidelines    Revised Cardiac Risk Index

## 2019-05-08 NOTE — TELEPHONE ENCOUNTER
"  You are now scheduled for surgery at [facility].  Below are the details for your surgery.  Please read the \"Preparing for Your Surgery\" instructions and let us know if you have any questions.    Type of surgery: hysteroscopy, laparoscopy, tubal sterilization, D&C, endometrial ablation  Surgeon:  Mc Purvis MD  Location of surgery: [facility] OR    Date of surgery: 5-20-19    Time: 9:00am   Arrival Time: 8:00am    Time can change, to be confirmed a couple of days prior by pre-op surgery nurse.    Pre-Op Appt Date: DONE - Dr. Purvis 5-8-19.  Post-Op Appt Date: To be determined by provider     Packet sent out: Yes  Pre-cert/Authorization completed:  TBD by Financial Securing Office.   MA Sterilization/Hysterectomy Acknowledgment Consent signed: Not Applicable    [facility]  [phone]    Fax: [phone]  Same Day Surgery [phone]  Fax: [phone]    "

## 2019-05-08 NOTE — PATIENT INSTRUCTIONS
Preventive Health Recommendations  Female Ages 26 - 39  Yearly exam:   See your health care provider every year in order to    Review health changes.     Discuss preventive care.      Review your medicines if you your doctor has prescribed any.    Until age 30: Get a Pap test every three years (more often if you have had an abnormal result).    After age 30: Talk to your doctor about whether you should have a Pap test every 3 years or have a Pap test with HPV screening every 5 years.   You do not need a Pap test if your uterus was removed (hysterectomy) and you have not had cancer.  You should be tested each year for STDs (sexually transmitted diseases), if you're at risk.   Talk to your provider about how often to have your cholesterol checked.  If you are at risk for diabetes, you should have a diabetes test (fasting glucose).  Shots: Get a flu shot each year. Get a tetanus shot every 10 years.   Nutrition:     Eat at least 5 servings of fruits and vegetables each day.    Eat whole-grain bread, whole-wheat pasta and brown rice instead of white grains and rice.    Get adequate Calcium and Vitamin D.     Lifestyle    Exercise at least 150 minutes a week (30 minutes a day, 5 days of the week). This will help you control your weight and prevent disease.    Limit alcohol to one drink per day.    No smoking.     Wear sunscreen to prevent skin cancer.    See your dentist every six months for an exam and cleaning.    Before Your Surgery      Call your surgeon if there is any change in your health. This includes signs of a cold or flu (such as a sore throat, runny nose, cough, rash or fever).    Do not smoke, drink alcohol or take over the counter medicine (unless your surgeon or primary care doctor tells you to) for the 24 hours before and after surgery.    If you take prescribed drugs: Follow your doctor s orders about which medicines to take and which to stop until after surgery.    Eating and drinking prior to  surgery: follow the instructions from your surgeon    Take a shower or bath the night before surgery. Use the soap your surgeon gave you to gently clean your skin. If you do not have soap from your surgeon, use your regular soap. Do not shave or scrub the surgery site.  Wear clean pajamas and have clean sheets on your bed.

## 2019-05-09 ENCOUNTER — TELEPHONE (OUTPATIENT)
Dept: RHEUMATOLOGY | Facility: CLINIC | Age: 34
End: 2019-05-09

## 2019-05-09 DIAGNOSIS — L40.50 PSORIATIC ARTHRITIS (H): Primary | ICD-10-CM

## 2019-05-09 NOTE — RESULT ENCOUNTER NOTE
"[employer] message sent:  \"Ms. Forbes,    The tuberculosis, hepatitis B, and hepatitis C tests were negative. Humira has been prescribed to the specialty pharmacy so you will get a call from the pharmacy liaison after it is approved by your insurance.  In the mean time, please go to [email or web address] to sign up for the copay assistance program.     Sincerely,  Ronnie Cabrera MD  5/9/2019 12:33 AM\""

## 2019-05-09 NOTE — TELEPHONE ENCOUNTER
PA Initiation    Medication: humira pa pending   Insurance Company: [employer] - Phone [phone] Fax [phone]  Pharmacy Filling the Rx: [facility] - [place], [place] - [facility] [address]  Filling Pharmacy Phone:    Filling Pharmacy Fax:    Start Date: 5/9/2019

## 2019-05-10 LAB
COPATH REPORT: NORMAL
PAP: NORMAL

## 2019-05-10 NOTE — TELEPHONE ENCOUNTER
Prior Authorization Approval    Authorization Effective Date: 5/9/2019  Authorization Expiration Date: 5/8/2020  Medication: humira pa approved  Approved Dose/Quantity: 2/28ds  Reference #: [id number]   Insurance Company: [employer] - Phone [phone] Fax [phone]  Expected CoPay: $3124.92     CoPay Card Available: Yes    Foundation Assistance Needed: na  Which Pharmacy is filling the prescription (Not needed for infusion/clinic administered): [facility] - [place], [place] - [address]  Pharmacy Notified: Yes  Patient Notified: Yes

## 2019-05-14 LAB
FINAL DIAGNOSIS: NORMAL
HPV HR 12 DNA CVX QL NAA+PROBE: NEGATIVE
HPV16 DNA SPEC QL NAA+PROBE: NEGATIVE
HPV18 DNA SPEC QL NAA+PROBE: NEGATIVE
SPECIMEN DESCRIPTION: NORMAL
SPECIMEN SOURCE CVX/VAG CYTO: NORMAL

## 2019-05-17 ENCOUNTER — ANESTHESIA EVENT (OUTPATIENT)
Dept: SURGERY | Facility: CLINIC | Age: 34
End: 2019-05-17
Payer: COMMERCIAL

## 2019-05-17 PROBLEM — Z30.2 ADMISSION FOR STERILIZATION: Status: ACTIVE | Noted: 2019-05-17

## 2019-05-17 PROBLEM — N92.0 MENORRHAGIA: Status: ACTIVE | Noted: 2019-05-17

## 2019-05-17 NOTE — ANESTHESIA PREPROCEDURE EVALUATION
Anesthesia Pre-Procedure Evaluation    Patient: Shira Forbes   MRN: [id number] : 1985          Preoperative Diagnosis: sterilization,menorrhagia    Procedure(s):  HYSTEROSCOPY, WITH DILATION AND CURETTAGE  AND ENDOMETRIAL ABLATION  Laparoscopic Tubal Sterilization    Past Medical History:   Diagnosis Date     Chickenpox      PONV (postoperative nausea and vomiting)      Past Surgical History:   Procedure Laterality Date     APPENDECTOMY       LAPAROSCOPIC HERNIORRHAPHY INGUINAL  3/28/2012    Procedure:LAPAROSCOPIC HERNIORRHAPHY INGUINAL; Laparoscopic Left Inguinal Herniorrhaphy Possible Right Inguinal Hernia; Surgeon:EMMANUELLE CHAPPELL; Location:[place] OR     SURGICAL HISTORY OF -   06    D&C for incomplete AB       Anesthesia Evaluation     . Pt has had prior anesthetic. Type: General and MAC    History of anesthetic complications   - PONV        ROS/MED HX    ENT/Pulmonary:       Neurologic:       Cardiovascular:     (+) Dyslipidemia, ----. : . . . :. .       METS/Exercise Tolerance:     Hematologic:         Musculoskeletal:         GI/Hepatic:         Renal/Genitourinary:         Endo:         Psychiatric:     (+) psychiatric history anxiety      Infectious Disease:         Malignancy:         Other:                          Physical Exam  Normal systems: cardiovascular and dental    Airway   Mallampati: I  TM distance: >3 FB  Neck ROM: full    Dental     Cardiovascular   Rhythm and rate: regular and normal      Pulmonary    breath sounds clear to auscultation            Lab Results   Component Value Date    WBC 7.3 2018    HGB 14.2 2018    HCT 42.3 2018     2018    CRP <2.9 2018    SED 4 2018     2018    POTASSIUM 4.0 2018    CHLORIDE 106 2018    CO2 27 2018    BUN 11 2018    CR 0.70 2018    GLC 88 2018    BERNARDO 8.6 2018    ALBUMIN 4.1 2018    PROTTOTAL 7.3 2018    ALT 21 2018    AST  "10 12/12/2018    ALKPHOS 44 12/12/2018    BILITOTAL 0.3 12/12/2018    LIPASE 147 05/22/2017    TSH 1.41 12/12/2018    HCG Negative 04/26/2012       Preop Vitals  BP Readings from Last 3 Encounters:   05/08/19 127/84   05/01/19 138/82   12/12/18 124/82    Pulse Readings from Last 3 Encounters:   05/08/19 73   05/01/19 85   12/12/18 82      Resp Readings from Last 3 Encounters:   05/08/19 16   12/12/18 12   05/22/17 16    SpO2 Readings from Last 3 Encounters:   05/01/19 100%   12/12/18 100%   07/19/17 99%      Temp Readings from Last 1 Encounters:   05/08/19 36.4  C (97.6  F)    Ht Readings from Last 1 Encounters:   05/08/19 1.543 m (5' 0.75\")      Wt Readings from Last 1 Encounters:   05/08/19 65.3 kg (144 lb)    Estimated body mass index is 27.43 kg/m  as calculated from the following:    Height as of 5/8/19: 1.543 m (5' 0.75\").    Weight as of 5/8/19: 65.3 kg (144 lb).       Anesthesia Plan      History & Physical Review  History and physical reviewed and following examination; no interval change.    ASA Status:  1 .        Plan for General and ETT with Intravenous induction. Maintenance will be Balanced.    PONV prophylaxis:  Ondansetron (or other 5HT-3), Scopolamine patch and Dexamethasone or Solumedrol       Postoperative Care  Postoperative pain management:  IV analgesics and Oral pain medications.      Consents  Anesthetic plan, risks, benefits and alternatives discussed with:  Patient and Spouse..                 Kim rPyor, APRN CRNA  "

## 2019-05-20 ENCOUNTER — HOSPITAL ENCOUNTER (OUTPATIENT)
Facility: CLINIC | Age: 34
Discharge: HOME | End: 2019-05-20
Attending: OBSTETRICS & GYNECOLOGY | Admitting: OBSTETRICS & GYNECOLOGY
Payer: COMMERCIAL

## 2019-05-20 ENCOUNTER — ANESTHESIA (OUTPATIENT)
Dept: SURGERY | Facility: CLINIC | Age: 34
End: 2019-05-20
Payer: COMMERCIAL

## 2019-05-20 VITALS
HEART RATE: 66 BPM | WEIGHT: 144 LBS | BODY MASS INDEX: 28.27 KG/M2 | RESPIRATION RATE: 16 BRPM | SYSTOLIC BLOOD PRESSURE: 112 MMHG | HEIGHT: 60 IN | TEMPERATURE: 98.1 F | DIASTOLIC BLOOD PRESSURE: 86 MMHG | OXYGEN SATURATION: 95 %

## 2019-05-20 DIAGNOSIS — Z98.890 S/P ENDOMETRIAL ABLATION: Primary | ICD-10-CM

## 2019-05-20 DIAGNOSIS — Z30.2 ADMISSION FOR STERILIZATION: ICD-10-CM

## 2019-05-20 LAB — HCG UR QL: NEGATIVE

## 2019-05-20 PROCEDURE — 37000009 ZZH ANESTHESIA TECHNICAL FEE, EACH ADDTL 15 MIN: Performed by: OBSTETRICS & GYNECOLOGY

## 2019-05-20 PROCEDURE — 40000305 ZZH STATISTIC PRE PROC ASSESS I: Performed by: OBSTETRICS & GYNECOLOGY

## 2019-05-20 PROCEDURE — 88302 TISSUE EXAM BY PATHOLOGIST: CPT | Performed by: OBSTETRICS & GYNECOLOGY

## 2019-05-20 PROCEDURE — 25000125 ZZHC RX 250: Performed by: NURSE ANESTHETIST, CERTIFIED REGISTERED

## 2019-05-20 PROCEDURE — 25000132 ZZH RX MED GY IP 250 OP 250 PS 637: Performed by: NURSE ANESTHETIST, CERTIFIED REGISTERED

## 2019-05-20 PROCEDURE — 36000058 ZZH SURGERY LEVEL 3 EA 15 ADDTL MIN: Performed by: OBSTETRICS & GYNECOLOGY

## 2019-05-20 PROCEDURE — 25000125 ZZHC RX 250: Performed by: OBSTETRICS & GYNECOLOGY

## 2019-05-20 PROCEDURE — 25000128 H RX IP 250 OP 636: Performed by: NURSE ANESTHETIST, CERTIFIED REGISTERED

## 2019-05-20 PROCEDURE — 58563 HYSTEROSCOPY ABLATION: CPT | Mod: 51 | Performed by: OBSTETRICS & GYNECOLOGY

## 2019-05-20 PROCEDURE — 58661 LAPAROSCOPY REMOVE ADNEXA: CPT | Performed by: OBSTETRICS & GYNECOLOGY

## 2019-05-20 PROCEDURE — 88305 TISSUE EXAM BY PATHOLOGIST: CPT | Performed by: OBSTETRICS & GYNECOLOGY

## 2019-05-20 PROCEDURE — 88305 TISSUE EXAM BY PATHOLOGIST: CPT | Mod: 26 | Performed by: OBSTETRICS & GYNECOLOGY

## 2019-05-20 PROCEDURE — 71000012 ZZH RECOVERY PHASE 1 LEVEL 1 FIRST HR: Performed by: OBSTETRICS & GYNECOLOGY

## 2019-05-20 PROCEDURE — 25800030 ZZH RX IP 258 OP 636: Performed by: NURSE ANESTHETIST, CERTIFIED REGISTERED

## 2019-05-20 PROCEDURE — 25000128 H RX IP 250 OP 636: Performed by: OBSTETRICS & GYNECOLOGY

## 2019-05-20 PROCEDURE — 81025 URINE PREGNANCY TEST: CPT | Performed by: NURSE ANESTHETIST, CERTIFIED REGISTERED

## 2019-05-20 PROCEDURE — 37000008 ZZH ANESTHESIA TECHNICAL FEE, 1ST 30 MIN: Performed by: OBSTETRICS & GYNECOLOGY

## 2019-05-20 PROCEDURE — 88302 TISSUE EXAM BY PATHOLOGIST: CPT | Mod: 26 | Performed by: OBSTETRICS & GYNECOLOGY

## 2019-05-20 PROCEDURE — 27210794 ZZH OR GENERAL SUPPLY STERILE: Performed by: OBSTETRICS & GYNECOLOGY

## 2019-05-20 PROCEDURE — C1888 ENDOVAS NON-CARDIAC ABL CATH: HCPCS | Performed by: OBSTETRICS & GYNECOLOGY

## 2019-05-20 PROCEDURE — 27110028 ZZH OR GENERAL SUPPLY NON-STERILE: Performed by: OBSTETRICS & GYNECOLOGY

## 2019-05-20 PROCEDURE — 71000027 ZZH RECOVERY PHASE 2 EACH 15 MINS: Performed by: OBSTETRICS & GYNECOLOGY

## 2019-05-20 PROCEDURE — 36000056 ZZH SURGERY LEVEL 3 1ST 30 MIN: Performed by: OBSTETRICS & GYNECOLOGY

## 2019-05-20 RX ORDER — SODIUM CHLORIDE, SODIUM LACTATE, POTASSIUM CHLORIDE, CALCIUM CHLORIDE 600; 310; 30; 20 MG/100ML; MG/100ML; MG/100ML; MG/100ML
INJECTION, SOLUTION INTRAVENOUS CONTINUOUS
Status: DISCONTINUED | OUTPATIENT
Start: 2019-05-20 | End: 2019-05-20 | Stop reason: HOSPADM

## 2019-05-20 RX ORDER — MEPERIDINE HYDROCHLORIDE 25 MG/ML
12.5 INJECTION INTRAMUSCULAR; INTRAVENOUS; SUBCUTANEOUS
Status: COMPLETED | OUTPATIENT
Start: 2019-05-20 | End: 2019-05-20

## 2019-05-20 RX ORDER — ONDANSETRON 4 MG/1
4 TABLET, ORALLY DISINTEGRATING ORAL EVERY 30 MIN PRN
Status: DISCONTINUED | OUTPATIENT
Start: 2019-05-20 | End: 2019-05-20 | Stop reason: HOSPADM

## 2019-05-20 RX ORDER — DEXAMETHASONE SODIUM PHOSPHATE 4 MG/ML
INJECTION, SOLUTION INTRA-ARTICULAR; INTRALESIONAL; INTRAMUSCULAR; INTRAVENOUS; SOFT TISSUE PRN
Status: DISCONTINUED | OUTPATIENT
Start: 2019-05-20 | End: 2019-05-20

## 2019-05-20 RX ORDER — CIPROFLOXACIN 2 MG/ML
400 INJECTION, SOLUTION INTRAVENOUS SEE ADMIN INSTRUCTIONS
Status: DISCONTINUED | OUTPATIENT
Start: 2019-05-20 | End: 2019-05-20 | Stop reason: HOSPADM

## 2019-05-20 RX ORDER — BUPIVACAINE HYDROCHLORIDE 2.5 MG/ML
INJECTION, SOLUTION INFILTRATION; PERINEURAL PRN
Status: DISCONTINUED | OUTPATIENT
Start: 2019-05-20 | End: 2019-05-20 | Stop reason: HOSPADM

## 2019-05-20 RX ORDER — HYDROCODONE BITARTRATE AND ACETAMINOPHEN 5; 325 MG/1; MG/1
1-2 TABLET ORAL EVERY 4 HOURS PRN
Qty: 10 TABLET | Refills: 0 | Status: SHIPPED | OUTPATIENT
Start: 2019-05-20 | End: 2020-02-03

## 2019-05-20 RX ORDER — FENTANYL CITRATE 50 UG/ML
25-50 INJECTION, SOLUTION INTRAMUSCULAR; INTRAVENOUS
Status: CANCELLED | OUTPATIENT
Start: 2019-05-20

## 2019-05-20 RX ORDER — DEXAMETHASONE SODIUM PHOSPHATE 4 MG/ML
4 INJECTION, SOLUTION INTRA-ARTICULAR; INTRALESIONAL; INTRAMUSCULAR; INTRAVENOUS; SOFT TISSUE EVERY 10 MIN PRN
Status: DISCONTINUED | OUTPATIENT
Start: 2019-05-20 | End: 2019-05-20 | Stop reason: HOSPADM

## 2019-05-20 RX ORDER — ONDANSETRON 2 MG/ML
INJECTION INTRAMUSCULAR; INTRAVENOUS PRN
Status: DISCONTINUED | OUTPATIENT
Start: 2019-05-20 | End: 2019-05-20

## 2019-05-20 RX ORDER — METOCLOPRAMIDE 10 MG/1
10 TABLET ORAL EVERY 6 HOURS PRN
Status: DISCONTINUED | OUTPATIENT
Start: 2019-05-20 | End: 2019-05-20 | Stop reason: HOSPADM

## 2019-05-20 RX ORDER — FENTANYL CITRATE 50 UG/ML
INJECTION, SOLUTION INTRAMUSCULAR; INTRAVENOUS PRN
Status: DISCONTINUED | OUTPATIENT
Start: 2019-05-20 | End: 2019-05-20

## 2019-05-20 RX ORDER — BUPIVACAINE HYDROCHLORIDE 5 MG/ML
INJECTION, SOLUTION PERINEURAL PRN
Status: DISCONTINUED | OUTPATIENT
Start: 2019-05-20 | End: 2019-05-20 | Stop reason: HOSPADM

## 2019-05-20 RX ORDER — IBUPROFEN 200 MG
600 TABLET ORAL
Status: CANCELLED | OUTPATIENT
Start: 2019-05-20

## 2019-05-20 RX ORDER — NALOXONE HYDROCHLORIDE 0.4 MG/ML
.1-.4 INJECTION, SOLUTION INTRAMUSCULAR; INTRAVENOUS; SUBCUTANEOUS
Status: DISCONTINUED | OUTPATIENT
Start: 2019-05-20 | End: 2019-05-20 | Stop reason: HOSPADM

## 2019-05-20 RX ORDER — ALBUTEROL SULFATE 0.83 MG/ML
2.5 SOLUTION RESPIRATORY (INHALATION) EVERY 4 HOURS PRN
Status: DISCONTINUED | OUTPATIENT
Start: 2019-05-20 | End: 2019-05-20 | Stop reason: HOSPADM

## 2019-05-20 RX ORDER — KETOROLAC TROMETHAMINE 30 MG/ML
30 INJECTION, SOLUTION INTRAMUSCULAR; INTRAVENOUS ONCE
Status: COMPLETED | OUTPATIENT
Start: 2019-05-20 | End: 2019-05-20

## 2019-05-20 RX ORDER — SCOLOPAMINE TRANSDERMAL SYSTEM 1 MG/1
1 PATCH, EXTENDED RELEASE TRANSDERMAL ONCE
Status: COMPLETED | OUTPATIENT
Start: 2019-05-20 | End: 2019-05-20

## 2019-05-20 RX ORDER — PROPOFOL 10 MG/ML
INJECTION, EMULSION INTRAVENOUS PRN
Status: DISCONTINUED | OUTPATIENT
Start: 2019-05-20 | End: 2019-05-20

## 2019-05-20 RX ORDER — GLYCOPYRROLATE 0.2 MG/ML
INJECTION, SOLUTION INTRAMUSCULAR; INTRAVENOUS PRN
Status: DISCONTINUED | OUTPATIENT
Start: 2019-05-20 | End: 2019-05-20

## 2019-05-20 RX ORDER — GABAPENTIN 300 MG/1
300 CAPSULE ORAL ONCE
Status: COMPLETED | OUTPATIENT
Start: 2019-05-20 | End: 2019-05-20

## 2019-05-20 RX ORDER — CIPROFLOXACIN 2 MG/ML
400 INJECTION, SOLUTION INTRAVENOUS
Status: COMPLETED | OUTPATIENT
Start: 2019-05-20 | End: 2019-05-20

## 2019-05-20 RX ORDER — BUPIVACAINE HYDROCHLORIDE AND EPINEPHRINE 5; 5 MG/ML; UG/ML
INJECTION, SOLUTION PERINEURAL PRN
Status: DISCONTINUED | OUTPATIENT
Start: 2019-05-20 | End: 2019-05-20 | Stop reason: HOSPADM

## 2019-05-20 RX ORDER — LIDOCAINE 40 MG/G
CREAM TOPICAL
Status: DISCONTINUED | OUTPATIENT
Start: 2019-05-20 | End: 2019-05-20 | Stop reason: HOSPADM

## 2019-05-20 RX ORDER — FENTANYL CITRATE 50 UG/ML
25-50 INJECTION, SOLUTION INTRAMUSCULAR; INTRAVENOUS
Status: DISCONTINUED | OUTPATIENT
Start: 2019-05-20 | End: 2019-05-20 | Stop reason: HOSPADM

## 2019-05-20 RX ORDER — ONDANSETRON 2 MG/ML
4 INJECTION INTRAMUSCULAR; INTRAVENOUS EVERY 30 MIN PRN
Status: DISCONTINUED | OUTPATIENT
Start: 2019-05-20 | End: 2019-05-20 | Stop reason: HOSPADM

## 2019-05-20 RX ORDER — METOCLOPRAMIDE HYDROCHLORIDE 5 MG/ML
10 INJECTION INTRAMUSCULAR; INTRAVENOUS EVERY 6 HOURS PRN
Status: DISCONTINUED | OUTPATIENT
Start: 2019-05-20 | End: 2019-05-20 | Stop reason: HOSPADM

## 2019-05-20 RX ORDER — HYDROCODONE BITARTRATE AND ACETAMINOPHEN 5; 325 MG/1; MG/1
1 TABLET ORAL
Status: CANCELLED | OUTPATIENT
Start: 2019-05-20

## 2019-05-20 RX ORDER — NEOSTIGMINE METHYLSULFATE 1 MG/ML
VIAL (ML) INJECTION PRN
Status: DISCONTINUED | OUTPATIENT
Start: 2019-05-20 | End: 2019-05-20

## 2019-05-20 RX ORDER — ACETAMINOPHEN 325 MG/1
975 TABLET ORAL ONCE
Status: COMPLETED | OUTPATIENT
Start: 2019-05-20 | End: 2019-05-20

## 2019-05-20 RX ADMIN — DEXAMETHASONE SODIUM PHOSPHATE 8 MG: 4 INJECTION, SOLUTION INTRA-ARTICULAR; INTRALESIONAL; INTRAMUSCULAR; INTRAVENOUS; SOFT TISSUE at 09:12

## 2019-05-20 RX ADMIN — FENTANYL CITRATE 100 MCG: 50 INJECTION, SOLUTION INTRAMUSCULAR; INTRAVENOUS at 10:01

## 2019-05-20 RX ADMIN — GLYCOPYRROLATE 0.6 MG: 0.2 INJECTION, SOLUTION INTRAMUSCULAR; INTRAVENOUS at 09:52

## 2019-05-20 RX ADMIN — ROCURONIUM BROMIDE 50 MG: 10 INJECTION INTRAVENOUS at 09:12

## 2019-05-20 RX ADMIN — METRONIDAZOLE 500 MG: 500 INJECTION, SOLUTION INTRAVENOUS at 09:00

## 2019-05-20 RX ADMIN — MEPERIDINE HYDROCHLORIDE 12.5 MG: 25 INJECTION INTRAMUSCULAR; INTRAVENOUS; SUBCUTANEOUS at 10:43

## 2019-05-20 RX ADMIN — GABAPENTIN 300 MG: 300 CAPSULE ORAL at 08:39

## 2019-05-20 RX ADMIN — MIDAZOLAM 2 MG: 1 INJECTION INTRAMUSCULAR; INTRAVENOUS at 09:06

## 2019-05-20 RX ADMIN — KETOROLAC TROMETHAMINE 30 MG: 30 INJECTION, SOLUTION INTRAMUSCULAR at 09:03

## 2019-05-20 RX ADMIN — CIPROFLOXACIN 400 MG: 2 INJECTION, SOLUTION INTRAVENOUS at 09:05

## 2019-05-20 RX ADMIN — ONDANSETRON 4 MG: 2 INJECTION INTRAMUSCULAR; INTRAVENOUS at 09:12

## 2019-05-20 RX ADMIN — FENTANYL CITRATE 250 MCG: 50 INJECTION, SOLUTION INTRAMUSCULAR; INTRAVENOUS at 09:06

## 2019-05-20 RX ADMIN — MEPERIDINE HYDROCHLORIDE 12.5 MG: 25 INJECTION INTRAMUSCULAR; INTRAVENOUS; SUBCUTANEOUS at 10:28

## 2019-05-20 RX ADMIN — Medication 3 MG: at 09:52

## 2019-05-20 RX ADMIN — PROPOFOL 100 MG: 10 INJECTION, EMULSION INTRAVENOUS at 09:39

## 2019-05-20 RX ADMIN — LIDOCAINE HYDROCHLORIDE 0.3 ML: 10 INJECTION, SOLUTION EPIDURAL; INFILTRATION; INTRACAUDAL; PERINEURAL at 08:40

## 2019-05-20 RX ADMIN — PROPOFOL 100 MG: 10 INJECTION, EMULSION INTRAVENOUS at 09:12

## 2019-05-20 RX ADMIN — SCOPALAMINE 1 PATCH: 1 PATCH, EXTENDED RELEASE TRANSDERMAL at 09:02

## 2019-05-20 RX ADMIN — SODIUM CHLORIDE, POTASSIUM CHLORIDE, SODIUM LACTATE AND CALCIUM CHLORIDE: 600; 310; 30; 20 INJECTION, SOLUTION INTRAVENOUS at 09:45

## 2019-05-20 RX ADMIN — ACETAMINOPHEN 975 MG: 325 TABLET, FILM COATED ORAL at 08:39

## 2019-05-20 RX ADMIN — SODIUM CHLORIDE, POTASSIUM CHLORIDE, SODIUM LACTATE AND CALCIUM CHLORIDE: 600; 310; 30; 20 INJECTION, SOLUTION INTRAVENOUS at 08:40

## 2019-05-20 ASSESSMENT — MIFFLIN-ST. JEOR: SCORE: 1269.68

## 2019-05-20 NOTE — DISCHARGE INSTRUCTIONS
Same Day Surgery Discharge Instructions  Special Precautions After Surgery - Adult    1. It is not unusual to feel lightheaded or faint, up to 24 hours after surgery or while taking pain medication.  If you have these symptoms; sit for a few minutes before standing and have someone assist you when getting up.  2. You should rest and relax for the next 24 hours and must have someone stay with you for at least 24 hours after your discharge.  3. DO NOT DRIVE any vehicle or operate mechanical equipment for 24 hours following the end of your surgery.  DO NOT DRIVE while taking narcotic pain medications that have been prescribed by your physician.  If you had a limb operated on, you must be able to use it fully to drive.  4. DO NOT drink alcoholic beverages for 24 hours following surgery or while taking prescription pain medication.  5. Drink clear liquids (apple juice, ginger ale, broth, 7-Up, etc.).  Progress to your regular diet as you feel able.  6. Any questions call your physician and do not make important decisions for 24 hours.          __________________________________________________________________________________________________________________________________  IMPORTANT NUMBERS:    [facility] Main Number:  [phone], [phone]  Pharmacy:  [phone]  Same Day Surgery:  [phone], Monday - Friday until 8:30 p.m.  Urgent Care:  [phone]  Emergency Room:  [phone]      [facility]:  [phone]                                                                             [facility]:  [phone] option 1  [facility]:  [phone]     OB Clinic:  [phone]   [facility]ty Clinic:  [phone]   Home Medical Equipment: [phone]  [facility]:  [phone]

## 2019-05-20 NOTE — ANESTHESIA POSTPROCEDURE EVALUATION
Patient: Shira Forbes    Procedure(s):  HYSTEROSCOPY, WITH DILATION AND CURETTAGE  AND ENDOMETRIAL ABLATION, Laparoscopic Bilateral Tubal Ligation  Laparoscopic Tubal Sterilization    Diagnosis:sterilization,menorrhagia  Diagnosis Additional Information: No value filed.    Anesthesia Type:  General, ETT    Note:  Anesthesia Post Evaluation    Patient location during evaluation: Bedside  Patient participation: Able to fully participate in evaluation  Level of consciousness: awake and alert  Pain management: adequate  Airway patency: patent  Cardiovascular status: acceptable  Respiratory status: acceptable  Hydration status: acceptable  PONV: none     Anesthetic complications: None          Last vitals:  Vitals:    05/20/19 1015 05/20/19 1030 05/20/19 1045   BP: (!) 137/93 (!) 137/95 (!) 131/92   Pulse:  83 74   Resp: 16 23 13   Temp:      SpO2: 99% 96% 99%         Electronically Signed By: JOSELIN Mclaughlin CRNA  May 20, 2019  10:53 AM

## 2019-05-20 NOTE — OP NOTE
[facility] Gynecology Brief Operative Note    Pre-operative diagnosis: sterilization,menorrhagia   Post-operative diagnosis: Same   Procedure: Dilation and curettage  Endometrial ablation  Hysteroscopy  Laproscopy  Salpingectomy- bilateral   Surgeon: Mc Purvis MD   Assistant(s): None   Anesthesia: General Endotracheal Anesthesia   Estimated blood loss: 5 ml   Total IV fluids: (See anesthesia record)  1100 ml   Blood transfusion: No transfusion was given during surgery   Total urine output: (See anesthesia record)  300ml   Drains: None   Specimens: bilateral fallopian tubes and endometrial curettings   Findings: Normal pelvis   Complications: None   Condition: Stable   Comments: Shira Forbes   1985  [id number]      Shira Forbes  presented for the above procedure.  She has heavy painful periods , and has no desire for future childbearing  I met with Shira  and her , Jair and discussed the planned procedure as well as the expected post operative course.  Risks of complications were noted and postoperative signs to watch for outlined.  Questions were answered and consent signed.  She was taken to the OR [facility] where she was placed in the supine position. She underwent General anesthesia with endotracheal intubation.  She was then placed in the Dorso-lithotomy position in [facility].  An examination under anesthesia was performed that showed: Multiparous uterus retroverted retroflexed  She was prepped and draped.  A timeout was held confirming her identity and proposed procedures. All were in agreement.     Speculum was placed in the vagina and cervix was isolated.  This was grasped with a single-tooth tenaculum.  A Rhino tenaculum was placed to the endocervical loss and secured to the anterior cervical lip.  A Bautista catheter was placed to straight drainage.    Attention was then turned to the abdomen.  Each abdominal incision was pre-instilled with 0.25% Marcaine  Marcaine.  An umbilical incision was made the Veress needle was placed through the incision and opening pressure was 3 mmHg.  0.9 L of CO2 were insufflated.  The Veress needle was removed a 5 mm trocar and cannula placed in the difficulty.  Patient documented visually.    8 mm port was placed in the suprapubic area through previous scar under direct vision.  Derrek-Radha needle was then placed midway between the 2 incisions to aid in the tubal ligation.    Findings are as followed.  Liver edge and gallbladder appeared to be normal in this on the appropriate side of the abdomen.  Uterus is multiparous in size retroverted normally mobile.  Both ovaries are normal both fallopian tubes normal color and caliber with normal fimbriated ends.  Subsequent exam showed the endometrium to be thickened both tubal ostia were clearly visualized.    Using the LigaSure 5 mm cautery cutting system the left cornua was grasped and cauterized.  The mesosalpinx linearly transected and the tube freed and removed through the 8 mm port site.  An echo procedure carried out on the right.  At this point using a butterfly needle with 0.5% Marcaine with epinephrine the suspensory ligaments of the uterus instilled and postoperative pain control.  Infundibulopelvic ligament was instilled as well by.  Pneumoperitoneum was reduced and suture removed incisions closed with 4-0 Vicryl suture and Dermabond applied    Attention was turned to the vagina.  The Rhino tenaculum was removed uterus was sounded to 9 cm.  She was easily serially dilated #6 Hegar dilator.  Hysteroscopy was then performed showing the thickened endometrium as noted above.  Sharp curettage was undertaken with a large amount tissue obtained.  Repeat hysteroscopy showed excellent clearance of the endometrial cavity.  Endocervix was measured at 3.5 cm.    The Magui was then placed and the cavity assessment test was passed.  She underwent endometrial ablation for 2 minutes after  which the Magui was removed repeat hysteroscopy performed.  Is excellent ablation.  Minimal blood loss.  At this point violence was removed.  Sponge and counts were correct.  Patient was awakened taken to PACU in good condition.      Mc Purvis

## 2019-05-23 LAB — COPATH REPORT: NORMAL

## 2019-09-10 DIAGNOSIS — M47.819 SPONDYLOARTHROPATHY: ICD-10-CM

## 2019-09-10 LAB
ALBUMIN SERPL-MCNC: 4.1 G/DL (ref 3.4–5)
ALP SERPL-CCNC: 52 U/L (ref 40–150)
ALT SERPL W P-5'-P-CCNC: 20 U/L (ref 0–50)
AST SERPL W P-5'-P-CCNC: 13 U/L (ref 0–45)
BASOPHILS # BLD AUTO: 0 10E9/L (ref 0–0.2)
BASOPHILS NFR BLD AUTO: 0.4 %
BILIRUB DIRECT SERPL-MCNC: <0.1 MG/DL (ref 0–0.2)
BILIRUB SERPL-MCNC: 0.3 MG/DL (ref 0.2–1.3)
CREAT SERPL-MCNC: 0.72 MG/DL (ref 0.52–1.04)
CRP SERPL-MCNC: <2.9 MG/L (ref 0–8)
DIFFERENTIAL METHOD BLD: NORMAL
EOSINOPHIL # BLD AUTO: 0.4 10E9/L (ref 0–0.7)
EOSINOPHIL NFR BLD AUTO: 5.1 %
ERYTHROCYTE [DISTWIDTH] IN BLOOD BY AUTOMATED COUNT: 13.2 % (ref 10–15)
ERYTHROCYTE [SEDIMENTATION RATE] IN BLOOD BY WESTERGREN METHOD: 4 MM/H (ref 0–20)
GFR SERPL CREATININE-BSD FRML MDRD: >90 ML/MIN/{1.73_M2}
HCT VFR BLD AUTO: 43.3 % (ref 35–47)
HGB BLD-MCNC: 14.7 G/DL (ref 11.7–15.7)
LYMPHOCYTES # BLD AUTO: 2.4 10E9/L (ref 0.8–5.3)
LYMPHOCYTES NFR BLD AUTO: 34.3 %
MCH RBC QN AUTO: 30.8 PG (ref 26.5–33)
MCHC RBC AUTO-ENTMCNC: 33.9 G/DL (ref 31.5–36.5)
MCV RBC AUTO: 91 FL (ref 78–100)
MONOCYTES # BLD AUTO: 0.4 10E9/L (ref 0–1.3)
MONOCYTES NFR BLD AUTO: 6.2 %
NEUTROPHILS # BLD AUTO: 3.8 10E9/L (ref 1.6–8.3)
NEUTROPHILS NFR BLD AUTO: 54 %
PLATELET # BLD AUTO: 249 10E9/L (ref 150–450)
PROT SERPL-MCNC: 7.6 G/DL (ref 6.8–8.8)
RBC # BLD AUTO: 4.77 10E12/L (ref 3.8–5.2)
WBC # BLD AUTO: 7.1 10E9/L (ref 4–11)

## 2019-09-10 PROCEDURE — 80076 HEPATIC FUNCTION PANEL: CPT | Performed by: INTERNAL MEDICINE

## 2019-09-10 PROCEDURE — 85652 RBC SED RATE AUTOMATED: CPT | Performed by: INTERNAL MEDICINE

## 2019-09-10 PROCEDURE — 86140 C-REACTIVE PROTEIN: CPT | Performed by: INTERNAL MEDICINE

## 2019-09-10 PROCEDURE — 36415 COLL VENOUS BLD VENIPUNCTURE: CPT | Performed by: INTERNAL MEDICINE

## 2019-09-10 PROCEDURE — 82565 ASSAY OF CREATININE: CPT | Performed by: INTERNAL MEDICINE

## 2019-09-10 PROCEDURE — 85025 COMPLETE CBC W/AUTO DIFF WBC: CPT | Performed by: INTERNAL MEDICINE

## 2019-09-11 ENCOUNTER — OFFICE VISIT (OUTPATIENT)
Dept: RHEUMATOLOGY | Facility: CLINIC | Age: 34
End: 2019-09-11
Payer: COMMERCIAL

## 2019-09-11 VITALS
BODY MASS INDEX: 28.28 KG/M2 | OXYGEN SATURATION: 98 % | HEART RATE: 71 BPM | DIASTOLIC BLOOD PRESSURE: 87 MMHG | WEIGHT: 144.8 LBS | SYSTOLIC BLOOD PRESSURE: 127 MMHG

## 2019-09-11 DIAGNOSIS — Z79.899 HIGH RISK MEDICATION USE: ICD-10-CM

## 2019-09-11 DIAGNOSIS — L40.50 PSORIATIC ARTHRITIS (H): Primary | ICD-10-CM

## 2019-09-11 DIAGNOSIS — F41.9 ANXIETY: ICD-10-CM

## 2019-09-11 PROCEDURE — 90732 PPSV23 VACC 2 YRS+ SUBQ/IM: CPT | Performed by: INTERNAL MEDICINE

## 2019-09-11 PROCEDURE — 99214 OFFICE O/P EST MOD 30 MIN: CPT | Mod: 25 | Performed by: INTERNAL MEDICINE

## 2019-09-11 PROCEDURE — 90471 IMMUNIZATION ADMIN: CPT | Performed by: INTERNAL MEDICINE

## 2019-09-11 RX ORDER — ESCITALOPRAM OXALATE 5 MG/1
5 TABLET ORAL DAILY
Qty: 90 TABLET | Refills: 1 | Status: SHIPPED | OUTPATIENT
Start: 2019-09-11 | End: 2021-08-18

## 2019-09-11 RX ORDER — ESCITALOPRAM OXALATE 5 MG/1
5 TABLET ORAL DAILY
COMMUNITY
End: 2020-02-03

## 2019-09-11 ASSESSMENT — PAIN SCALES - GENERAL: PAINLEVEL: NO PAIN (0)

## 2019-09-11 NOTE — PROGRESS NOTES
Rheumatology Clinic Visit      Shira Forbes MRN# [id number]   YOB: 1985 Age: 34 year old      Date of visit: 9/11/19   Referring provider: Dr. Carlos Herring  PCP: Clinic, [facility]    Chief Complaint   Patient presents with:  RECHECK: 4 mth follow up re: joint pain.      Assessment and Plan     1.  Psoriatic Arthritis: Asymmetric arthritis involving her MCPs and PIP consistent with psoriatic arthritis; dx'd on 5/1/2019; prolonged morning stiffness; and pain and stiffness that improved with activity and worsened with inactivity.  She has dry skin on her hands that has an appearance more consistent with eczema than psoriasis initially.  Possible nail pitting on one fingernail initially.  RF, CCP, ANTONIO, dsDNA, PR3, and MPO negative; no cytopenias; normal renal function, ESR, and CRP.  SI joint x-rays suggest sacroiliitis and are consistent with inflammatory back pain.  Hand and chest x-rays were okay.  Humira was started in May 2019 with significant improvement of joints and skin; but benefit began wearing off with symptoms worsening 7 days after dosing - worsening peripheral and axial symptoms; also with injection site reactions that have been worsening with each injection.  Therefore, stop Humira and start Enbrel.  Prefer to use a TNF inhibitor given her good response to Humira; and prefer Enbrel over other TNF inhibitors because of being less likely to form ADA abs.   - Discontinue Humira 40mg SQ every 14 days   - Start Enbrel 50mg SQ every 7 days  - Labs in 3 months: CBC, Creatinine, Hepatic Panel, ESR, CRP    # Etanercept (Enbrel) Risks and Benefits: The risks and benefits of etanercept were discussed in detail and the patient verbalized understanding.  The risks discussed include, but are not limited to, the risk for hypersensitivity, anaphylaxis, anaphylactoid reactions, an increased risk for serious infections leading to hospitalization or death, a possible increased risk for lymphoma  "and other malignancies, a possible worsening of demyelinating diseases, a possible worsening of heart failure, possible reactivation of hepatitis B, and possible reactivation of latent tuberculosis.  Subcutaneous injections may result in injection site reactions and/or pain at the site of injection.  It was discussed that the medication would need to be discontinued if a serious infection develops.  It was discussed that live vaccinations should not be received while using etanercept or within 30 days prior to starting etanercept.  I encouraged reviewing the package insert and asking any questions about the medication.      2. Anxiety: well controlled on Lexapro 5mg daily per patient; she was receiving from another provider and she asks for a refill today.  Refill lexapro today. Advised that she will need to follow-up with her PCP for future refills.   - escitalopram (LEXAPRO) 5 MG tablet; Take 1 tablet (5 mg) by mouth daily  Dispense: 90 tablet; Refill: 1    Ms. Forbes verbalized agreement with and understanding of the rational for the diagnosis and treatment plan.  All questions were answered to best of my ability and the patient's satisfaction. Ms. Forbes was advised to contact the clinic with any questions that may arise after the clinic visit.      Thank you for involving me in the care of the patient    Return to clinic: 3-4 months      HPI   Shira Forbes is a 34 year old female with a past medical history significant for contact dermatitis, vitamin D deficiency, and axiety who is seen for follow-up of psoriatic arthritis    Since last seen, a message was sent to me from Ms. Forbes:  \"I just want to send you a quick update and a thank you! I have been on Humira for 1 month now and feel amazing! I didn't actually realize how miserable I was.  I am sleeping better at night, not exhausted and can move easier.  Also my skin on my hands is totally clear and has been since after my first dose (this has " "been huge for me!!!!) Thank you so much! Will see you in September.  Truly life changing! Thank you! Jason\"    Today, Ms. Forbes reports that fairly quickly with Humira her arthritis improved.  Morning stiffness for less than 5-10 minutes.  Positive gelling phenomenon that was reduced in severity.  Everything got better including her hands where she no longer had cracking of her skin on the hands.  However, after subsequent dosing from Humira, approximately after 1.5-2 months of using it, the duration of benefit to started to weekend.  Symptoms began returning approximately 7 days after taking the Humira dose.  She also started having injection site reactions that have been worsening with each injection.      Denies fevers, chills, nausea, vomiting, constipation, diarrhea. No abdominal pain. No chest pain/pressure, palpitations, or shortness of breath. No LE swelling. No neck pain. No oral or nasal sores.  No sicca symptoms. No photosensitivity or photophobia. No eye pain or redness. No history of inflammatory eye disease. No history of inflammatory bowel disease.  No history of DVT, pulmonary embolism, or miscarriage.   No history of serositis.  No history of Raynaud's Phenomenon.  No seizure history.  No known renal disorder.      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[employer]   GEN: No fevers, chills, night sweats, or weight change  SKIN: See HPI  HEENT: No epistaxis. No oral or nasal ulcers.  CV: No chest pain, pressure, palpitations, or dyspnea on exertion.  PULM: No SOB, wheeze, cough.  GI: No nausea, vomiting, constipation, diarrhea. No blood in stool. No abdominal pain.  : No blood in urine.  MSK: See HPI.  NEURO: No numbness or tingling  ENDO: No heat/cold intolerance.  EXT: No LE swelling  PSYCH: Negative    Active Problem List     Patient Active Problem List   Diagnosis     Contact dermatitis and other eczema, due to unspecified cause "     Vitamin D deficiency     CARDIOVASCULAR SCREENING; LDL GOAL LESS THAN 160     Malrotation of intestine     S/P appy     H/O left inguinal hernia repair     Anxiety     Menorrhagia     Admission for sterilization     Past Medical History     Past Medical History:   Diagnosis Date     Chickenpox      PONV (postoperative nausea and vomiting)      Past Surgical History     Past Surgical History:   Procedure Laterality Date     APPENDECTOMY       DILATION AND CURETTAGE, HYSTEROSCOPY, ABLATE ENDOMETRIUM, COMBINED N/A 5/20/2019    Procedure: HYSTEROSCOPY, WITH DILATION AND CURETTAGE  AND ENDOMETRIAL ABLATION, Laparoscopic Bilateral Tubal Ligation;  Surgeon: Mc Purvis MD;  Location: [facility]     LAPAROSCOPIC HERNIORRHAPHY INGUINAL  3/28/2012    Procedure:LAPAROSCOPIC HERNIORRHAPHY INGUINAL; Laparoscopic Left Inguinal Herniorrhaphy Possible Right Inguinal Hernia; Surgeon:EMMANUELLE CHAPPELL; Location:[facility] OR     LAPAROSCOPIC TUBAL LIGATION Bilateral 5/20/2019    Procedure: Laparoscopic Tubal Sterilization;  Surgeon: Mc Purvis MD;  Location: [facility] OR     SURGICAL HISTORY OF -   05/20/06    D&C for incomplete AB     Allergy     Allergies   Allergen Reactions     Keflex [Cephalexin Monohydrate] Rash     Morphine Hives     Shellfish Allergy      Sulfa Drugs Rash     Current Medication List     Current Outpatient Medications   Medication Sig     adalimumab (HUMIRA *CF*) 40 MG/0.4ML pen kit Inject 0.4 mLs (40 mg) Subcutaneous every 14 days . Hold for signs of infection, then seek medical attention.     Cholecalciferol (D3 VITAMIN PO)      escitalopram (LEXAPRO) 5 MG tablet Take 5 mg by mouth daily     Omega-3 Fatty Acids (FISH OIL PO)      triamcinolone (KENALOG) 0.1 % external ointment Apply sparingly to affected area three times daily for 14 days.     clobetasol (TEMOVATE) 0.05 % external ointment Apply sparingly to affected area twice daily as needed.  Do not apply to face. (Patient not taking: Reported on  9/11/2019)     DiphenhydrAMINE HCl (DIPHENDRYL PO) Take 25 mg by mouth nightly as needed      HYDROcodone-acetaminophen (NORCO) 5-325 MG tablet Take 1-2 tablets by mouth every 4 hours as needed for moderate to severe pain (Patient not taking: Reported on 9/11/2019)     IBUPROFEN PO      loratadine (CLARITIN) 10 MG tablet Take 10 mg by mouth daily     naproxen (NAPROSYN) 375 MG tablet Take 1 tablet (375 mg) by mouth every 12 hours as needed for other (Inflammatory pain) (Patient not taking: Reported on 9/11/2019)     No current facility-administered medications for this visit.          Social History   See HPI    Family History     Family History   Problem Relation Age of Onset     Psychotic Disorder Maternal Grandmother      Alcohol/Drug Maternal Grandmother         alcohol     Heart Disease Maternal Grandfather         MI     Hypertension Maternal Grandfather      Diabetes Maternal Grandfather      Arthritis Paternal Grandmother         RA     Lipids Paternal Grandmother      Parkinsonism Paternal Grandfather      Heart Disease Paternal Grandfather         pacemaker     Lymphoma Cousin      Breast Cancer Maternal Aunt      Paternal grandmother: RA  Paternal aunt: lupus  Paternal cousin: RA    Physical Exam     Temp Readings from Last 3 Encounters:   05/20/19 98.1  F (36.7  C) (Oral)   05/08/19 97.6  F (36.4  C)   12/12/18 97.2  F (36.2  C) (Tympanic)     BP Readings from Last 5 Encounters:   09/11/19 127/87   05/20/19 112/86   05/08/19 127/84   05/01/19 138/82   12/12/18 124/82     Pulse Readings from Last 1 Encounters:   09/11/19 71     Resp Readings from Last 1 Encounters:   05/20/19 16     Estimated body mass index is 28.28 kg/m  as calculated from the following:    Height as of 5/20/19: 1.524 m (5').    Weight as of this encounter: 65.7 kg (144 lb 12.8 oz).    GEN: NAD  HEENT: MMM. No oral lesions. Anicteric, noninjected sclera  CV: S1, S2. RRR. No m/r/g.  PULM: CTA bilaterally. No w/c.  MSK: MCPs, PIPs,  wrists, elbows, shoulders, knees, ankles, and MTPs without swelling or tenderness to palpation. Achilles tendons nontender to palpation.  No dactylitis.     NEURO: UE and LE strengths 5/5 and equal bilaterally.   SKIN:  Skin on her hands no longer cracking and bleeding like it was in the past.   EXT: No LE edema  PSYCH: Alert. Appropriate.    Labs / Imaging (select studies)   RF/CCP  Recent Labs   Lab Test 12/12/18  0920   CCPIGG 1   RHF <20     ANTONIO  Recent Labs   Lab Test 12/12/18  0920   ALANNA Negative     RNP/Sm/SSA/SSB  Recent Labs   Lab Test 01/06/15  1051   TREPAB Negative     dsDNA  Recent Labs   Lab Test 12/12/18  0920   DNA 2     C3/C4  Recent Labs   Lab Test 07/19/17  1154   K0EWCMC 22     ANCA  Recent Labs   Lab Test 12/12/18  0920   PR3IGG <0.2   MPOIGG <0.2     CBC  Recent Labs   Lab Test 09/10/19  0820 12/12/18  0920 05/22/17  1612  12/21/14  0847  04/26/12  2220   WBC 7.1 7.3 9.9   < > 7.5  --  5.9   RBC 4.77 4.61 5.05   < > 4.77  --  4.59   HGB 14.7 14.2 15.6   < > 14.1   < > 13.5   HCT 43.3 42.3 44.2   < > 41.5  --  40.7   MCV 91 92 88   < > 87  --  89   RDW 13.2 13.0 13.0   < > 12.3  --  12.4    255 270   < > 282  --  247   MCH 30.8 30.8 30.9   < > 29.6  --  29.4   MCHC 33.9 33.6 35.3   < > 34.0  --  33.2   NEUTROPHIL 54.0  --   --   --  68.2  --  46.8   LYMPH 34.3  --   --   --  22.2  --  37.2   MONOCYTE 6.2  --   --   --  6.4  --  9.9   EOSINOPHIL 5.1  --   --   --  2.5  --  5.4   BASOPHIL 0.4  --   --   --  0.4  --  0.5   ANEU 3.8  --   --   --  5.1  --  2.8   ALYM 2.4  --   --   --  1.7  --  2.2   LAMONT 0.4  --   --   --  0.5  --  0.6   AEOS 0.4  --   --   --  0.2  --  0.3   ABAS 0.0  --   --   --  0.0  --  0.0    < > = values in this interval not displayed.     CMP  Recent Labs   Lab Test 09/10/19  0820 12/12/18  0920 05/22/17  1612 12/21/14  0847 04/26/12  2220   NA  --  138 136 137 142   POTASSIUM  --  4.0 3.8 3.5 3.7   CHLORIDE  --  106 105 107 106   CO2  --  27 22 24 26   ANIONGAP  --   5 9 6 10   GLC  --  88 100* 90 99   BUN  --  11 12 9 12   CR 0.72 0.70 0.67 0.63 0.80   GFRESTIMATED >90 >90 >90  Non  GFR Calc   >90  Non  GFR Calc   85   GFRESTBLACK >90 >90 >90   GFR Calc   >90   GFR Calc   >90   BERNARDO  --  8.6 8.7 8.7 9.1   BILITOTAL 0.3 0.3 0.2 0.3 0.3   ALBUMIN 4.1 4.1 4.1 3.8 4.4   PROTTOTAL 7.6 7.3 7.6 7.5 7.4   ALKPHOS 52 44 47 46 59   AST 13 10 14 7 24   ALT 20 21 16 18 20     Iron Studies  Recent Labs   Lab Test 06/28/13  1032   PAIGE 29     Calcium/VitaminD  Recent Labs   Lab Test 12/12/18  0920 05/22/17  1612 12/21/14  0847   BERNARDO 8.6 8.7 8.7     ESR/CRP  Recent Labs   Lab Test 09/10/19  0820 12/12/18  0920 07/19/17  1154   SED 4 4  --    CRP <2.9 <2.9 <2.9     TSH/T4  Recent Labs   Lab Test 12/12/18  0920 05/23/16  0956 06/28/13  1032   TSH 1.41 2.17 1.89     Hepatitis B  Recent Labs   Lab Test 05/02/19  1400 01/06/15  1051   HBCAB Nonreactive  --    HEPBANG  --  Nonreactive     Hepatitis C  Recent Labs   Lab Test 05/02/19  1400   HCVAB Nonreactive     Lyme ab screening  Recent Labs   Lab Test 12/12/18  0920   LYMEGM 0.04     Tuberculosis Screening  Recent Labs   Lab Test 05/02/19  1400   TBRES Negative     HIV Screening  Recent Labs   Lab Test 01/06/15  1051   HIAGAB Nonreactive   HIV-1 p24 Ag & HIV-1/HIV-2 Ab Not Detected       Immunization History     Immunization History   Administered Date(s) Administered     Influenza (IIV3) PF 10/15/2008, 10/19/2012     Influenza Vaccine IM > 6 months Valent IIV4 09/29/2015, 09/26/2016     Pneumo Conj 13-V (2010&after) 05/08/2019     TD (ADULT, 7+) 02/01/2004     TDAP Vaccine (Adacel) 05/26/2015          Chart documentation done in part with Dragon Voice recognition Software. Although reviewed after completion, some word and grammatical error may remain.    Ronnie Cabrera MD

## 2019-09-11 NOTE — NURSING NOTE
RAPID3 (0-30) Cumulative Score  4          RAPID3 Weighted Score (divide #4 by 3 and that is the weighted score)  1.3     Rosemary Atkinson, [facility]  9/11/2019  1:31 PM

## 2019-09-12 ENCOUNTER — TELEPHONE (OUTPATIENT)
Dept: RHEUMATOLOGY | Facility: CLINIC | Age: 34
End: 2019-09-12

## 2019-09-12 DIAGNOSIS — L40.50 PSORIATIC ARTHRITIS (H): Primary | ICD-10-CM

## 2019-09-12 NOTE — TELEPHONE ENCOUNTER
PA Initiation    Medication: enbrel pa pending   Insurance Company: [employer] - Phone [phone] Fax [phone]  Pharmacy Filling the Rx: [facility] - [place], [place] - [facility] [address]  Filling Pharmacy Phone:    Filling Pharmacy Fax:    Start Date: 9/12/2019

## 2019-09-16 NOTE — TELEPHONE ENCOUNTER
PRIOR AUTHORIZATION DENIED    Medication: enbrel pa denied    Denial Date: 9/16/2019    Denial Rational: pt must fail ONE-Cimzia, Simponi, or Stelara. And must fail BOTH-Cosentyx and Xeljanz. Documentation of preference for TNF inhibitor and preference for Enbrel from chart notes was given in PA.      Appeal Information: **Please let me know if you would like to appeal**

## 2019-09-17 NOTE — TELEPHONE ENCOUNTER
"Rheumatology Telephone Note:    I called and spoke with Ms. Forbes.  Enbrel denied so will use Simponi instead.      Note from the denial documentation: \"Denial Rational: pt must fail ONE-Cimzia, Simponi, or Stelara. And must fail BOTH-Cosentyx and Xeljanz. Documentation of preference for TNF inhibitor and preference for Enbrel from chart notes was given in PA.  \"    1. Psoriatic arthritis (H)  - Start golimumab (SIMPONI) 50 MG/0.5ML auto-injector pen; Inject 0.5 mLs (50 mg) Subcutaneous every 28 days . Hold for signs of infection, and seek medical attention.  Dispense: 0.5 mL; Refill: 5  - Discontinue Enbrel    All questions were answered and she thanked me for the call.     Ronnie Cabrera MD  9/17/2019 5:21 PM      "

## 2019-09-18 ENCOUNTER — TELEPHONE (OUTPATIENT)
Dept: RHEUMATOLOGY | Facility: CLINIC | Age: 34
End: 2019-09-18

## 2019-09-18 NOTE — TELEPHONE ENCOUNTER
PA Initiation    Medication: Simponi- PENDING  Insurance Company: [employer] - Phone [phone] Fax [phone]  Pharmacy Filling the Rx: [facility] - [place], [place] - [facility] [address]  Filling Pharmacy Phone:    Filling Pharmacy Fax:    Start Date: 9/18/2019

## 2019-09-18 NOTE — TELEPHONE ENCOUNTER
Received fax from [employer] asking more questions about Khai CENTENO.  Answered questions and faxed back.

## 2019-11-03 ENCOUNTER — HEALTH MAINTENANCE LETTER (OUTPATIENT)
Age: 34
End: 2019-11-03

## 2020-01-06 DIAGNOSIS — R53.83 FATIGUE, UNSPECIFIED TYPE: Primary | ICD-10-CM

## 2020-01-13 DIAGNOSIS — L40.50 PSORIATIC ARTHROPATHY (H): Primary | ICD-10-CM

## 2020-01-13 DIAGNOSIS — R53.83 FATIGUE, UNSPECIFIED TYPE: ICD-10-CM

## 2020-01-13 DIAGNOSIS — Z79.899 ENCOUNTER FOR LONG-TERM (CURRENT) USE OF OTHER MEDICATIONS: ICD-10-CM

## 2020-01-13 LAB
ALBUMIN SERPL-MCNC: 3.9 G/DL (ref 3.4–5)
ALP SERPL-CCNC: 40 U/L (ref 40–150)
ALT SERPL W P-5'-P-CCNC: 21 U/L (ref 0–50)
AST SERPL W P-5'-P-CCNC: 13 U/L (ref 0–45)
BASOPHILS # BLD AUTO: 0 10E9/L (ref 0–0.2)
BASOPHILS NFR BLD AUTO: 0.3 %
BILIRUB DIRECT SERPL-MCNC: 0.1 MG/DL (ref 0–0.2)
BILIRUB SERPL-MCNC: 0.6 MG/DL (ref 0.2–1.3)
CREAT SERPL-MCNC: 0.76 MG/DL (ref 0.52–1.04)
CRP SERPL-MCNC: <2.9 MG/L (ref 0–8)
DIFFERENTIAL METHOD BLD: NORMAL
EOSINOPHIL # BLD AUTO: 0.3 10E9/L (ref 0–0.7)
EOSINOPHIL NFR BLD AUTO: 5.1 %
ERYTHROCYTE [DISTWIDTH] IN BLOOD BY AUTOMATED COUNT: 13.1 % (ref 10–15)
ERYTHROCYTE [SEDIMENTATION RATE] IN BLOOD BY WESTERGREN METHOD: 5 MM/H (ref 0–20)
GFR SERPL CREATININE-BSD FRML MDRD: >90 ML/MIN/{1.73_M2}
HCT VFR BLD AUTO: 44.9 % (ref 35–47)
HGB BLD-MCNC: 15.2 G/DL (ref 11.7–15.7)
LYMPHOCYTES # BLD AUTO: 2 10E9/L (ref 0.8–5.3)
LYMPHOCYTES NFR BLD AUTO: 33.7 %
MCH RBC QN AUTO: 30.5 PG (ref 26.5–33)
MCHC RBC AUTO-ENTMCNC: 33.9 G/DL (ref 31.5–36.5)
MCV RBC AUTO: 90 FL (ref 78–100)
MONOCYTES # BLD AUTO: 0.5 10E9/L (ref 0–1.3)
MONOCYTES NFR BLD AUTO: 8.1 %
NEUTROPHILS # BLD AUTO: 3.2 10E9/L (ref 1.6–8.3)
NEUTROPHILS NFR BLD AUTO: 52.8 %
PLATELET # BLD AUTO: 258 10E9/L (ref 150–450)
PROT SERPL-MCNC: 7.5 G/DL (ref 6.8–8.8)
RBC # BLD AUTO: 4.98 10E12/L (ref 3.8–5.2)
TSH SERPL DL<=0.005 MIU/L-ACNC: 2.51 MU/L (ref 0.4–4)
WBC # BLD AUTO: 6 10E9/L (ref 4–11)

## 2020-01-13 PROCEDURE — 86140 C-REACTIVE PROTEIN: CPT | Performed by: INTERNAL MEDICINE

## 2020-01-13 PROCEDURE — 80076 HEPATIC FUNCTION PANEL: CPT | Performed by: INTERNAL MEDICINE

## 2020-01-13 PROCEDURE — 85025 COMPLETE CBC W/AUTO DIFF WBC: CPT | Performed by: INTERNAL MEDICINE

## 2020-01-13 PROCEDURE — 36415 COLL VENOUS BLD VENIPUNCTURE: CPT | Performed by: FAMILY MEDICINE

## 2020-01-13 PROCEDURE — 82565 ASSAY OF CREATININE: CPT | Performed by: INTERNAL MEDICINE

## 2020-01-13 PROCEDURE — 85652 RBC SED RATE AUTOMATED: CPT | Performed by: INTERNAL MEDICINE

## 2020-01-13 PROCEDURE — 84443 ASSAY THYROID STIM HORMONE: CPT | Performed by: FAMILY MEDICINE

## 2020-01-15 ENCOUNTER — OFFICE VISIT (OUTPATIENT)
Dept: RHEUMATOLOGY | Facility: CLINIC | Age: 35
End: 2020-01-15
Payer: COMMERCIAL

## 2020-01-15 VITALS
DIASTOLIC BLOOD PRESSURE: 82 MMHG | BODY MASS INDEX: 26.9 KG/M2 | HEART RATE: 77 BPM | OXYGEN SATURATION: 98 % | SYSTOLIC BLOOD PRESSURE: 136 MMHG | WEIGHT: 137 LBS | HEIGHT: 60 IN

## 2020-01-15 DIAGNOSIS — Z79.899 HIGH RISK MEDICATION USE: ICD-10-CM

## 2020-01-15 DIAGNOSIS — L40.50 PSORIATIC ARTHRITIS (H): Primary | ICD-10-CM

## 2020-01-15 PROCEDURE — 99214 OFFICE O/P EST MOD 30 MIN: CPT | Performed by: INTERNAL MEDICINE

## 2020-01-15 ASSESSMENT — MIFFLIN-ST. JEOR: SCORE: 1237.93

## 2020-01-15 NOTE — PROGRESS NOTES
"Rheumatology Clinic Visit      Shira Forbes MRN# [id number]   YOB: 1985 Age: 34 year old      Date of visit: 1/15/20   PCP: Caleb, [facility]    Chief Complaint   Patient presents with:  Arthritis: Psoriatic. Patient states she is feeling ok, not good like when she was on humira    Assessment and Plan     1.  Psoriatic Arthritis: Asymmetric arthritis involving her MCPs and PIP consistent with psoriatic arthritis; dx'd on 5/1/2019; prolonged morning stiffness; and pain and stiffness that improved with activity and worsened with inactivity.  She has dry skin on her hands that has an appearance more consistent with eczema than psoriasis initially.  Possible nail pitting on one fingernail initially.  RF, CCP, ANTONIO, dsDNA, PR3, and MPO negative; no cytopenias; normal renal function, ESR, and CRP.  SI joint x-rays suggest sacroiliitis and are consistent with inflammatory back pain.  Hand and chest x-rays were okay.  Humira was started in May 2019 with significant improvement of joints and skin; but benefit began wearing off with symptoms worsening 7 days after dosing - worsening peripheral and axial symptoms; also with injection site reactions that have been worsening with each injection.  Therefore, stop Humira and Rx'd Enbrel but this was denied; her insurance required that she must fail ONE of cimzia, simponi, stelara, and both cosentyx and xeljanz. Therefore on simponi now with worse skin symptoms and worse arthritis symptoms; has been >3 mo duration of therapy and discussed longer duration versus changing and because she is doing so much worse than when on Humira and mild \"fogginess\" with Simponi, will change to Cosentyx.    - Discontinue Simponi  - Start Cosentyx 150mg SQ weekly on weeks 0, 1, 2, 3, and 4 and then every 4 weeks thereafter     - Labs in 3 months: CBC, Creatinine, Hepatic Panel, ESR, CRP    # Cosentyx (Secukinumab) Risks and Benefits: The risks and benefits of secukinumab " "were discussed in detail and the patient verbalized understanding.  The risks discussed include, but are not limited to, the risk for hypersensitivity, anaphylaxis, anaphylactoid reactions, an increased risk for serious infections leading to hospitalization or death, a possible increased risk for lymphoma and other malignancies, possible reactivation of hepatitis B, and possible reactivation of latent tuberculosis.  Subcutaneous injections may result in injection site reactions and/or pain at the site of injection.  It was discussed that the medication would need to be discontinued if a serious infection develops.  It was discussed that live vaccinations should not be received while using secukinumab or within 30 days prior to starting secukinumab.  I encouraged reviewing the package insert and asking any questions about the medication.      Ms. Forbes verbalized agreement with and understanding of the rational for the diagnosis and treatment plan.  All questions were answered to best of my ability and the patient's satisfaction. Ms. Forbes was advised to contact the clinic with any questions that may arise after the clinic visit.      Thank you for involving me in the care of the patient    Return to clinic: 3-4 months      HPI   Shira Forbes is a 34 year old female with a past medical history significant for contact dermatitis, vitamin D deficiency, and axiety who is seen for follow-up of psoriatic arthritis    Since last seen, a message was sent to me from Ms. Forbes:  \"I just want to send you a quick update and a thank you! I have been on Humira for 1 month now and feel amazing! I didn't actually realize how miserable I was.  I am sleeping better at night, not exhausted and can move easier.  Also my skin on my hands is totally clear and has been since after my first dose (this has been huge for me!!!!) Thank you so much! Will see you in September.  Truly life changing! Thank you! Jason\"    9/2019: Ms. " "Andrei reported that fairly quickly with Humira her arthritis improved.  Morning stiffness for less than 5-10 minutes.  Positive gelling phenomenon that was reduced in severity.  Everything got better including her hands where she no longer had cracking of her skin on the hands.  However, after subsequent dosing from Humira, approximately after 1.5-2 months of using it, the duration of benefit to started to weekend.  Symptoms began returning approximately 7 days after taking the Humira dose.  She also started having injection site reactions that have been worsening with each injection.      Today, 1/15/2020: Not doing as well as when she was on Humira.  Some \"fogginess\" with Simponi.  Has been on Simponi for more than 3 months.  Feels like her toes are swollen.  Hands have not been doing well; she says when she was on Humira her hands completely cleared up with regard to her skin.  Joint pain worse in the morning and improves with time and activity.  Back and hips are doing okay but other joints are not.  Would like to change medication.    Denies fevers, chills, nausea, vomiting, constipation, diarrhea. No abdominal pain. No chest pain/pressure, palpitations, or shortness of breath. No LE swelling. No neck pain. No oral or nasal sores.  No sicca symptoms. No photosensitivity or photophobia. No eye pain or redness. No history of inflammatory eye disease. No history of inflammatory bowel disease.  No history of DVT, pulmonary embolism, or miscarriage.   No history of serositis.  No history of Raynaud's Phenomenon.  No seizure history.  No known renal disorder.      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[employer]   GEN: No fevers, chills, night sweats, or weight change  SKIN: See HPI  HEENT: No epistaxis. No oral or nasal ulcers.  CV: No chest pain, pressure, palpitations, or dyspnea on exertion.  PULM: No SOB, wheeze, cough.  GI: No nausea, " vomiting, constipation, diarrhea. No blood in stool. No abdominal pain.  : No blood in urine.  MSK: See HPI.  NEURO: No numbness or tingling  ENDO: No heat/cold intolerance.  EXT: No LE swelling  PSYCH: Negative    Active Problem List     Patient Active Problem List   Diagnosis     Contact dermatitis and other eczema, due to unspecified cause     Vitamin D deficiency     CARDIOVASCULAR SCREENING; LDL GOAL LESS THAN 160     Malrotation of intestine     S/P appy     H/O left inguinal hernia repair     Anxiety     Menorrhagia     Admission for sterilization     Past Medical History     Past Medical History:   Diagnosis Date     Chickenpox      PONV (postoperative nausea and vomiting)      Past Surgical History     Past Surgical History:   Procedure Laterality Date     APPENDECTOMY       DILATION AND CURETTAGE, HYSTEROSCOPY, ABLATE ENDOMETRIUM, COMBINED N/A 5/20/2019    Procedure: HYSTEROSCOPY, WITH DILATION AND CURETTAGE  AND ENDOMETRIAL ABLATION, Laparoscopic Bilateral Tubal Ligation;  Surgeon: Mc Purvis MD;  Location: [facility]     LAPAROSCOPIC HERNIORRHAPHY INGUINAL  3/28/2012    Procedure:LAPAROSCOPIC HERNIORRHAPHY INGUINAL; Laparoscopic Left Inguinal Herniorrhaphy Possible Right Inguinal Hernia; Surgeon:EMMANUELLE CHAPPELL; Location:[facility]     LAPAROSCOPIC TUBAL LIGATION Bilateral 5/20/2019    Procedure: Laparoscopic Tubal Sterilization;  Surgeon: Mc Purvis MD;  Location: [facility]     SURGICAL HISTORY OF -   05/20/06    D&C for incomplete AB     Allergy     Allergies   Allergen Reactions     Keflex [Cephalexin Monohydrate] Rash     Morphine Hives     Shellfish Allergy      Sulfa Drugs Rash     Current Medication List     Current Outpatient Medications   Medication Sig     Cholecalciferol (D3 VITAMIN PO)      escitalopram (LEXAPRO) 5 MG tablet Take 1 tablet (5 mg) by mouth daily     golimumab (SIMPONI) 50 MG/0.5ML auto-injector pen Inject 0.5 mLs (50 mg) Subcutaneous every 28 days . Hold for  signs of infection, and seek medical attention.     IBUPROFEN PO      naproxen (NAPROSYN) 375 MG tablet Take 1 tablet (375 mg) by mouth every 12 hours as needed for other (Inflammatory pain)     Omega-3 Fatty Acids (FISH OIL PO)      triamcinolone (KENALOG) 0.1 % external ointment Apply sparingly to affected area three times daily for 14 days.     clobetasol (TEMOVATE) 0.05 % external ointment Apply sparingly to affected area twice daily as needed.  Do not apply to face. (Patient not taking: Reported on 9/11/2019)     DiphenhydrAMINE HCl (DIPHENDRYL PO) Take 25 mg by mouth nightly as needed      escitalopram (LEXAPRO) 5 MG tablet Take 5 mg by mouth daily     HYDROcodone-acetaminophen (NORCO) 5-325 MG tablet Take 1-2 tablets by mouth every 4 hours as needed for moderate to severe pain (Patient not taking: Reported on 9/11/2019)     loratadine (CLARITIN) 10 MG tablet Take 10 mg by mouth daily     No current facility-administered medications for this visit.          Social History   See HPI    Family History     Family History   Problem Relation Age of Onset     Psychotic Disorder Maternal Grandmother      Alcohol/Drug Maternal Grandmother         alcohol     Heart Disease Maternal Grandfather         MI     Hypertension Maternal Grandfather      Diabetes Maternal Grandfather      Arthritis Paternal Grandmother         RA     Lipids Paternal Grandmother      Parkinsonism Paternal Grandfather      Heart Disease Paternal Grandfather         pacemaker     Lymphoma Cousin      Breast Cancer Maternal Aunt      Paternal grandmother: RA  Paternal aunt: lupus  Paternal cousin: RA    Physical Exam     Temp Readings from Last 3 Encounters:   05/20/19 98.1  F (36.7  C) (Oral)   05/08/19 97.6  F (36.4  C)   12/12/18 97.2  F (36.2  C) (Tympanic)     BP Readings from Last 5 Encounters:   01/15/20 136/82   09/11/19 127/87   05/20/19 112/86   05/08/19 127/84   05/01/19 138/82     Pulse Readings from Last 1 Encounters:   01/15/20 77      Resp Readings from Last 1 Encounters:   05/20/19 16     Estimated body mass index is 26.76 kg/m  as calculated from the following:    Height as of this encounter: 1.524 m (5').    Weight as of this encounter: 62.1 kg (137 lb).    GEN: NAD  HEENT: MMM. No oral lesions. Anicteric, noninjected sclera  CV: S1, S2. RRR. No m/r/g.  PULM: CTA bilaterally. No w/c.  MSK: MCPs, PIPs, wrists, elbows, shoulders, knees, ankles, and MTPs without swelling or tenderness to palpation. Achilles tendons nontender to palpation.  No dactylitis.     NEURO: UE and LE strengths 5/5 and equal bilaterally.   SKIN:  Skin on her hands no longer cracking and bleeding like it was in the past.   EXT: No LE edema  PSYCH: Alert. Appropriate.    Labs / Imaging (select studies)   RF/CCP  Recent Labs   Lab Test 12/12/18  0920   CCPIGG 1   RHF <20     CBC  Recent Labs   Lab Test 01/13/20  0824 09/10/19  0820 12/12/18  0920  12/21/14  0847   WBC 6.0 7.1 7.3   < > 7.5   RBC 4.98 4.77 4.61   < > 4.77   HGB 15.2 14.7 14.2   < > 14.1   HCT 44.9 43.3 42.3   < > 41.5   MCV 90 91 92   < > 87   RDW 13.1 13.2 13.0   < > 12.3    249 255   < > 282   MCH 30.5 30.8 30.8   < > 29.6   MCHC 33.9 33.9 33.6   < > 34.0   NEUTROPHIL 52.8 54.0  --   --  68.2   LYMPH 33.7 34.3  --   --  22.2   MONOCYTE 8.1 6.2  --   --  6.4   EOSINOPHIL 5.1 5.1  --   --  2.5   BASOPHIL 0.3 0.4  --   --  0.4   ANEU 3.2 3.8  --   --  5.1   ALYM 2.0 2.4  --   --  1.7   LAMONT 0.5 0.4  --   --  0.5   AEOS 0.3 0.4  --   --  0.2   ABAS 0.0 0.0  --   --  0.0    < > = values in this interval not displayed.     CMP  Recent Labs   Lab Test 01/13/20  0824 09/10/19  0820 12/12/18  0920 05/22/17  1612 12/21/14  0847   NA  --   --  138 136 137   POTASSIUM  --   --  4.0 3.8 3.5   CHLORIDE  --   --  106 105 107   CO2  --   --  27 22 24   ANIONGAP  --   --  5 9 6   GLC  --   --  88 100* 90   BUN  --   --  11 12 9   CR 0.76 0.72 0.70 0.67 0.63   GFRESTIMATED >90 >90 >90 >90  Non  GFR  Calc   >90  Non  GFR Calc     GFRESTBLACK >90 >90 >90 >90   GFR Calc   >90   GFR Calc     BERNARDO  --   --  8.6 8.7 8.7   BILITOTAL 0.6 0.3 0.3 0.2 0.3   ALBUMIN 3.9 4.1 4.1 4.1 3.8   PROTTOTAL 7.5 7.6 7.3 7.6 7.5   ALKPHOS 40 52 44 47 46   AST 13 13 10 14 7   ALT 21 20 21 16 18     Calcium/VitaminD  Recent Labs   Lab Test 12/12/18  0920 05/22/17  1612 12/21/14  0847   BERNARDO 8.6 8.7 8.7     ESR/CRP  Recent Labs   Lab Test 01/13/20  0824 09/10/19  0820 12/12/18  0920   SED 5 4 4   CRP <2.9 <2.9 <2.9     Hepatitis B  Recent Labs   Lab Test 05/02/19  1400 01/06/15  1051   HBCAB Nonreactive  --    HEPBANG  --  Nonreactive     Hepatitis C  Recent Labs   Lab Test 05/02/19  1400   HCVAB Nonreactive     Lyme ab screening  Recent Labs   Lab Test 12/12/18  0920   LYMEGM 0.04       Tuberculosis Screening  Recent Labs   Lab Test 05/02/19  1400   TBRES Negative     HIV Screening  Recent Labs   Lab Test 01/06/15  1051   HIAGAB Nonreactive   HIV-1 p24 Ag & HIV-1/HIV-2 Ab Not Detected       Immunization History     Immunization History   Administered Date(s) Administered     Influenza (IIV3) PF 10/15/2008, 10/19/2012     Influenza Vaccine IM > 6 months Valent IIV4 09/29/2015, 09/26/2016, 10/08/2019     Pneumo Conj 13-V (2010&after) 05/08/2019     Pneumococcal 23 valent 09/11/2019     TD (ADULT, 7+) 02/01/2004     TDAP Vaccine (Adacel) 05/26/2015     Zoster vaccine recombinant adjuvanted (SHINGRIX) 10/14/2019          Chart documentation done in part with Dragon Voice recognition Software. Although reviewed after completion, some word and grammatical error may remain.    Ronnie Cabrera MD

## 2020-01-15 NOTE — NURSING NOTE
RAPID3 (0-30) Cumulative Score  11.0          RAPID3 Weighted Score (divide #4 by 3 and that is the weighted score)  3.6       Samaria Ye [facility]  1/15/2020 12:48 PM

## 2020-01-15 NOTE — PATIENT INSTRUCTIONS
Rheumatology    Dr. Ronnie Cabrera       M [facility] in [place]   (Monday)  [address]  [place] [address]       M [facility] in [place]   (Tuesday)  [address] [place] [address]    [facility] in [place]   (Wed., Thurs., and Friday)  [address] [place] [address]    Phone number: [phone]  Thank you for choosing [facility].  Samaria Ye CMA

## 2020-01-16 ENCOUNTER — TELEPHONE (OUTPATIENT)
Dept: RHEUMATOLOGY | Facility: CLINIC | Age: 35
End: 2020-01-16

## 2020-01-16 DIAGNOSIS — M46.1 SACROILIITIS (H): ICD-10-CM

## 2020-01-16 DIAGNOSIS — L40.50 PSORIATIC ARTHRITIS (H): Primary | ICD-10-CM

## 2020-01-16 NOTE — TELEPHONE ENCOUNTER
PA Initiation    Medication: cosbarbie pena pending   Insurance Company: [employer] - Phone [phone] Fax [phone]  Pharmacy Filling the Rx: [facility] - [place], [place] - [facility] [address]  Filling Pharmacy Phone:    Filling Pharmacy Fax:    Start Date: 1/16/2020

## 2020-01-20 NOTE — TELEPHONE ENCOUNTER
PRIOR AUTHORIZATION DENIED    Medication: cosentyx pa denied    Denial Date: 1/20/2020    Denial Rational:       Appeal Information:

## 2020-01-21 NOTE — TELEPHONE ENCOUNTER
Patient is aware pa denied and that dr epperson is on vacation for about another week and a half. She is due to fill her Simponi but does not want to fill it and wishes to wait to start a new med.

## 2020-02-03 ENCOUNTER — ANCILLARY PROCEDURE (OUTPATIENT)
Dept: GENERAL RADIOLOGY | Facility: CLINIC | Age: 35
End: 2020-02-03
Attending: FAMILY MEDICINE
Payer: COMMERCIAL

## 2020-02-03 ENCOUNTER — OFFICE VISIT (OUTPATIENT)
Dept: FAMILY MEDICINE | Facility: CLINIC | Age: 35
End: 2020-02-03
Payer: COMMERCIAL

## 2020-02-03 ENCOUNTER — TELEPHONE (OUTPATIENT)
Dept: RHEUMATOLOGY | Facility: CLINIC | Age: 35
End: 2020-02-03

## 2020-02-03 VITALS
SYSTOLIC BLOOD PRESSURE: 122 MMHG | HEART RATE: 86 BPM | RESPIRATION RATE: 20 BRPM | HEIGHT: 60 IN | TEMPERATURE: 98.9 F | WEIGHT: 137 LBS | DIASTOLIC BLOOD PRESSURE: 82 MMHG | OXYGEN SATURATION: 99 % | BODY MASS INDEX: 26.9 KG/M2

## 2020-02-03 DIAGNOSIS — R05.9 COUGH: ICD-10-CM

## 2020-02-03 DIAGNOSIS — J20.9 ACUTE BRONCHITIS WITH SYMPTOMS > 10 DAYS: Primary | ICD-10-CM

## 2020-02-03 PROBLEM — L40.50 PSORIATIC ARTHRITIS (H): Status: ACTIVE | Noted: 2020-02-03

## 2020-02-03 PROBLEM — M46.1 SACROILIITIS (H): Status: ACTIVE | Noted: 2020-02-03

## 2020-02-03 LAB
FLUAV+FLUBV AG SPEC QL: NEGATIVE
FLUAV+FLUBV AG SPEC QL: NEGATIVE
SPECIMEN SOURCE: NORMAL

## 2020-02-03 PROCEDURE — 87804 INFLUENZA ASSAY W/OPTIC: CPT | Performed by: FAMILY MEDICINE

## 2020-02-03 PROCEDURE — 71046 X-RAY EXAM CHEST 2 VIEWS: CPT | Mod: FY

## 2020-02-03 PROCEDURE — 99213 OFFICE O/P EST LOW 20 MIN: CPT | Performed by: FAMILY MEDICINE

## 2020-02-03 RX ORDER — PREDNISONE 20 MG/1
40 TABLET ORAL DAILY
Qty: 10 TABLET | Refills: 0 | Status: SHIPPED | OUTPATIENT
Start: 2020-02-03 | End: 2020-04-29

## 2020-02-03 RX ORDER — AZITHROMYCIN 250 MG/1
TABLET, FILM COATED ORAL
Qty: 6 TABLET | Refills: 0 | Status: SHIPPED | OUTPATIENT
Start: 2020-02-03 | End: 2021-08-18

## 2020-02-03 ASSESSMENT — MIFFLIN-ST. JEOR: SCORE: 1237.93

## 2020-02-03 NOTE — PROGRESS NOTES
SUBJECTIVE   Shira Forbes is a 34 year old female who presents with     RESPIRATORY SYMPTOMS      Duration: >1 week    Description  nasal congestion, rhinorrhea, sore throat, cough, fever, chills, ear pain both, headache, fatigue/malaise, hoarse voice, myalgias and nausea    Severity: moderate    Accompanying signs and symptoms: having pain thru the lower right side of the chest into the back     History (predisposing factors):  Exposed to everything     Precipitating or alleviating factors: None    Therapies tried and outcome:  rest and fluids, nyquil, dayquil, aleve, flonase, mucinex       PCP   [facility] [phone]    Health Maintenance        There are no preventive care reminders to display for this patient.    HPI        Patient Active Problem List   Diagnosis     Contact dermatitis and other eczema, due to unspecified cause     Vitamin D deficiency     CARDIOVASCULAR SCREENING; LDL GOAL LESS THAN 160     Malrotation of intestine     S/P appy     H/O left inguinal hernia repair     Anxiety     Menorrhagia     Admission for sterilization     Psoriatic arthritis (H)     Sacroiliitis (H)     Current Outpatient Medications   Medication     Cholecalciferol (D3 VITAMIN PO)     clobetasol (TEMOVATE) 0.05 % external ointment     DiphenhydrAMINE HCl (DIPHENDRYL PO)     escitalopram (LEXAPRO) 5 MG tablet     IBUPROFEN PO     naproxen (NAPROSYN) 375 MG tablet     Omega-3 Fatty Acids (FISH OIL PO)     triamcinolone (KENALOG) 0.1 % external ointment     ixekizumab (TALTZ) 80 MG/ML SOAJ auto-injector     ixekizumab (TALTZ) 80 MG/ML SOAJ auto-injector     No current facility-administered medications for this visit.        Patient Active Problem List   Diagnosis     Contact dermatitis and other eczema, due to unspecified cause     Vitamin D deficiency     CARDIOVASCULAR SCREENING; LDL GOAL LESS THAN 160     Malrotation of intestine     S/P appy     H/O left inguinal hernia repair     Anxiety      Menorrhagia     Admission for sterilization     Psoriatic arthritis (H)     Sacroiliitis (H)     Past Surgical History:   Procedure Laterality Date     APPENDECTOMY       DILATION AND CURETTAGE, HYSTEROSCOPY, ABLATE ENDOMETRIUM, COMBINED N/A 5/20/2019    Procedure: HYSTEROSCOPY, WITH DILATION AND CURETTAGE  AND ENDOMETRIAL ABLATION, Laparoscopic Bilateral Tubal Ligation;  Surgeon: Mc Purvis MD;  Location: [facility]     LAPAROSCOPIC HERNIORRHAPHY INGUINAL  3/28/2012    Procedure:LAPAROSCOPIC HERNIORRHAPHY INGUINAL; Laparoscopic Left Inguinal Herniorrhaphy Possible Right Inguinal Hernia; Surgeon:EMMANUELLE CHAPPELL; Location:[facility]     LAPAROSCOPIC TUBAL LIGATION Bilateral 5/20/2019    Procedure: Laparoscopic Tubal Sterilization;  Surgeon: Mc Purvis MD;  Location: [facility]     SURGICAL HISTORY OF -   05/20/06    D&C for incomplete AB       Social History     Tobacco Use     Smoking status: Never Smoker     Smokeless tobacco: Never Used   Substance Use Topics     Alcohol use: Yes     Comment: Occas/quit with pregnancy     Family History   Problem Relation Age of Onset     Psychotic Disorder Maternal Grandmother      Alcohol/Drug Maternal Grandmother         alcohol     Heart Disease Maternal Grandfather         MI     Hypertension Maternal Grandfather      Diabetes Maternal Grandfather      Arthritis Paternal Grandmother         RA     Lipids Paternal Grandmother      Parkinsonism Paternal Grandfather      Heart Disease Paternal Grandfather         pacemaker     Lymphoma Cousin      Breast Cancer Maternal Aunt          Current Outpatient Medications   Medication Sig Dispense Refill     Cholecalciferol (D3 VITAMIN PO)        clobetasol (TEMOVATE) 0.05 % external ointment Apply sparingly to affected area twice daily as needed.  Do not apply to face. 60 g 1     DiphenhydrAMINE HCl (DIPHENDRYL PO) Take 25 mg by mouth nightly as needed        escitalopram (LEXAPRO) 5 MG tablet Take 1 tablet (5 mg) by mouth  daily 90 tablet 1     IBUPROFEN PO        naproxen (NAPROSYN) 375 MG tablet Take 1 tablet (375 mg) by mouth every 12 hours as needed for other (Inflammatory pain)       Omega-3 Fatty Acids (FISH OIL PO)        triamcinolone (KENALOG) 0.1 % external ointment Apply sparingly to affected area three times daily for 14 days. 80 g 1     ixekizumab (TALTZ) 80 MG/ML SOAJ auto-injector Inject 160mg (2 Autojets) subcutaneously at week 0, then 80mg (1 Autojet) every 4 weeks. Hold for signs of infection, and seek medical attention. (Patient not taking: Reported on 2/3/2020) 2 mL 0     ixekizumab (TALTZ) 80 MG/ML SOAJ auto-injector Inject 1 mL (80 mg) Subcutaneous every 28 days . Hold for signs of infection, and seek medical attention. (Patient not taking: Reported on 2/3/2020) 1 mL 5     Allergies   Allergen Reactions     Keflex [Cephalexin Monohydrate] Rash     Morphine Hives     Shellfish Allergy      Sulfa Drugs Rash     Recent Labs   Lab Test 01/13/20  0824 09/10/19  0820 12/12/18  0920 05/22/17  1612   ALT 21 20 21 16   CR 0.76 0.72 0.70 0.67   GFRESTIMATED >90 >90 >90 >90  Non African American GFR Calc     GFRESTBLACK >90 >90 >90 >90  African American GFR Calc     POTASSIUM  --   --  4.0 3.8   TSH 2.51  --  1.41  --       BP Readings from Last 3 Encounters:   02/03/20 122/82   01/15/20 136/82   09/11/19 127/87    Wt Readings from Last 3 Encounters:   02/03/20 62.1 kg (137 lb)   01/15/20 62.1 kg (137 lb)   09/11/19 65.7 kg (144 lb 12.8 oz)                    Reviewed and updated:  Tobacco  Allergies  Meds  Med Hx  Surg Hx  Fam Hx  Soc Hx     ROS:  Constitutional, HEENT, cardiovascular, pulmonary, gi and gu systems are negative, except as otherwise noted.    PHYSICAL EXAM   /82 (Cuff Size: Adult Regular)   Pulse 86   Temp 98.9  F (37.2  C) (Tympanic)   Resp 20   Ht 1.524 m (5')   Wt 62.1 kg (137 lb)   SpO2 99%   Breastfeeding No   BMI 26.76 kg/m    Body mass index is 26.76 kg/m .  GENERAL: alert and no  distress  EYES: Eyes grossly normal to inspection, PERRL and conjunctivae and sclerae normal  HENT: normal cephalic/atraumatic, ear canals and TM's normal, rhinorrhea clear, oral mucous membranes moist and oropharxnx crowded  NECK: no adenopathy, no asymmetry, masses, or scars and thyroid normal to palpation  RESP: lungs clear to auscultation - no rales, rhonchi or wheezes  CV: regular rates and rhythm, normal S1 S2, no S3 or S4 and no murmur, click or rub  ABDOMEN: soft, nontender  MS: no gross musculoskeletal defects noted, no edema      XR CHEST 2 VW 2/3/2020 2:27 PM      HISTORY: cough; Cough                                                                      IMPRESSION: Negative exam.    Results for orders placed or performed in visit on 02/03/20   XR Chest 2 Views     Status: None    Narrative    XR CHEST 2 VW 2/3/2020 2:27 PM     HISTORY: cough; Cough      Impression    IMPRESSION: Negative exam.    CULLEN BISHOP MD   Results for orders placed or performed in visit on 02/03/20   Influenza A/B antigen     Status: None   Result Value Ref Range    Influenza A/B Agn Specimen Nasal     Influenza A Negative NEG^Negative    Influenza B Negative NEG^Negative       Assessment & Plan     (J20.9) Acute bronchitis with symptoms > 10 days  (primary encounter diagnosis)  Comment: Suspect symptoms secondary to acute bronchitis.  Chest x-ray and influenza test came back negative.  Treatment options discussed.  Azithromycin prescribed considering chronicity of symptoms and suggested to use prednisone for wheezing.  Continue well hydration, warm fluids, over-the-counter analgesia.  Follow-up if symptoms persist or worsen.  Patient understood and in agreement with above plan.  All questions answered.   Plan: azithromycin (ZITHROMAX) 250 MG tablet,         predniSONE (DELTASONE) 20 MG tablet            (R05) Cough  Comment: Continue over-the-counter antitussive, well hydration, warm fluids  Plan: XR Chest 2 Views, Influenza  A/B antigen                Patient Instructions     Patient Education     Bronchitis, Antibiotic Treatment (Adult)    Bronchitis is an infection of the air passages (bronchial tubes) in your lungs. It often occurs when you have a cold. This illness is contagious during the first few days and is spread through the air by coughing and sneezing, or by direct contact (touching the sick person and then touching your own eyes, nose, or mouth).  Symptoms of bronchitis include cough with mucus (phlegm) and low-grade fever. Bronchitis usually lasts 7 to 14 days. Mild cases can be treated with simple home remedies. More severe infection is treated with an antibiotic.  Home care  Follow these guidelines when caring for yourself at home:    If your symptoms are severe, rest at home for the first 2 to 3 days. When you go back to your usual activities, don't let yourself get too tired.    Don't smoke. Also stay away from secondhand smoke.    You may use over-the-counter medicines to control fever or pain, unless another medicine was prescribed. If you have chronic liver or kidney disease or have ever had a stomach ulcer or gastrointestinal bleeding, talk with your healthcare provider before using these medicines. Also talk to your provider if you are taking medicine to prevent blood clots. Aspirin should never be given to anyone younger than 18 who is ill with a viral infection or fever. It may cause severe liver or brain damage.    Your appetite may be low, so a light diet is fine. Stay well hydrated by drinking 6 to 8 glasses of fluids per day. This includes water, soft drinks, sports drinks, juices, tea, or soup. Extra fluids will help loosen mucus in your nose and lungs.    Over-the-counter cough, cold, and sore-throat medicines will not shorten the length of the illness, but they may be helpful to reduce your symptoms. Don't use decongestants if you have high blood pressure.    Finish all antibiotic medicine. Do this even if  you are feeling better after only a few days.  Follow-up care  Follow up with your healthcare provider, or as advised. If you had an X-ray or ECG (electrocardiogram), a specialist will review it. You will be told of any new test results that may affect your care.  If you are age 65 or older, if you smoke, or if you have a chronic lung disease or condition that affects your immune system, ask your healthcare provider about getting a pneumococcal vaccine and a yearly flu shot (influenza vaccine).  When to seek medical advice  Call your healthcare provider right away if any of these occur:    Fever of 100.4 F (38 C) or higher, or as directed by your healthcare provider    Coughing up more sputum    Weakness, drowsiness, headache, facial pain, ear pain, or a stiff neck  Call 911  Call 911 if any of these occur.    Coughing up blood    Weakness, drowsiness, headache, or stiff neck that get worse    Trouble breathing, wheezing, or pain with breathing  Date Last Reviewed: 6/1/2018 2000-2019 [employer]. [address] [place] [address]. All rights reserved. This information is not intended as a substitute for professional medical care. Always follow your healthcare professional's instructions.               Carlos Herring MD  [facility]

## 2020-02-03 NOTE — NURSING NOTE
Chief Complaint   Patient presents with     URI     /82 (Cuff Size: Adult Regular)   Pulse 86   Temp 98.9  F (37.2  C) (Tympanic)   Resp 20   Ht 1.524 m (5')   Wt 62.1 kg (137 lb)   SpO2 99%   Breastfeeding No   BMI 26.76 kg/m   Estimated body mass index is 26.76 kg/m  as calculated from the following:    Height as of this encounter: 1.524 m (5').    Weight as of this encounter: 62.1 kg (137 lb).  Patient presents to the clinic using No DME      Health Maintenance that is potentially due pending provider review:    There are no preventive care reminders to display for this patient.

## 2020-02-03 NOTE — PATIENT INSTRUCTIONS

## 2020-02-03 NOTE — TELEPHONE ENCOUNTER
PA Initiation    Medication: taltz  Insurance Company: [email or web address] - Phone [phone] Fax [phone]  Pharmacy Filling the Rx: [facility] - [place], [place] - [facility] [address]  Filling Pharmacy Phone:    Filling Pharmacy Fax:    Start Date: 2/3/2020

## 2020-02-03 NOTE — TELEPHONE ENCOUNTER
Rheumatology Telephone Note:    I called and spoke with Ms. Forbes. Insurance denied Cosentyx; wants Taltz first as an option.  Other options Xeljanz and Orencia not preferred because she has axial symptoms.  Taltz discussed.     - Discontinue Cosentyx (denied by her insurance)  - Start Taltz    All questions were answered and she thanked me for the call.     Ronnie Cabrera MD  2/3/2020 7:39 AM

## 2020-02-06 ENCOUNTER — MYC MEDICAL ADVICE (OUTPATIENT)
Dept: PHARMACY | Facility: CLINIC | Age: 35
End: 2020-02-06

## 2020-02-06 NOTE — TELEPHONE ENCOUNTER
Prior Authorization Approval    Authorization Effective Date: 2/3/2020  Authorization Expiration Date: 2/2/2021  Medication: taltz  Approved Dose/Quantity: loading and maintenance   Reference #: [id number]   Insurance Company: [employer] - Phone [phone] Fax [phone]  Expected CoPay: $2403.42     CoPay Card Available: Yes    Foundation Assistance Needed: na  Which Pharmacy is filling the prescription (Not needed for infusion/clinic administered): [facility] - [place], [place] - [address]  Pharmacy Notified: Yes  Patient Notified: Yes

## 2020-03-26 DIAGNOSIS — Z79.899 HIGH RISK MEDICATION USE: ICD-10-CM

## 2020-03-26 DIAGNOSIS — L40.50 PSORIATIC ARTHRITIS (H): ICD-10-CM

## 2020-03-26 LAB
ALBUMIN SERPL-MCNC: 3.9 G/DL (ref 3.4–5)
ALP SERPL-CCNC: 45 U/L (ref 40–150)
ALT SERPL W P-5'-P-CCNC: 25 U/L (ref 0–50)
AST SERPL W P-5'-P-CCNC: 13 U/L (ref 0–45)
BASOPHILS # BLD AUTO: 0 10E9/L (ref 0–0.2)
BASOPHILS NFR BLD AUTO: 0.5 %
BILIRUB DIRECT SERPL-MCNC: <0.1 MG/DL (ref 0–0.2)
BILIRUB SERPL-MCNC: 0.4 MG/DL (ref 0.2–1.3)
CREAT SERPL-MCNC: 0.78 MG/DL (ref 0.52–1.04)
CRP SERPL-MCNC: <2.9 MG/L (ref 0–8)
DIFFERENTIAL METHOD BLD: NORMAL
EOSINOPHIL # BLD AUTO: 0.6 10E9/L (ref 0–0.7)
EOSINOPHIL NFR BLD AUTO: 8.1 %
ERYTHROCYTE [DISTWIDTH] IN BLOOD BY AUTOMATED COUNT: 13.5 % (ref 10–15)
ERYTHROCYTE [SEDIMENTATION RATE] IN BLOOD BY WESTERGREN METHOD: 5 MM/H (ref 0–20)
GFR SERPL CREATININE-BSD FRML MDRD: >90 ML/MIN/{1.73_M2}
HCT VFR BLD AUTO: 43.2 % (ref 35–47)
HGB BLD-MCNC: 14.2 G/DL (ref 11.7–15.7)
LYMPHOCYTES # BLD AUTO: 2.2 10E9/L (ref 0.8–5.3)
LYMPHOCYTES NFR BLD AUTO: 29.2 %
MCH RBC QN AUTO: 30.3 PG (ref 26.5–33)
MCHC RBC AUTO-ENTMCNC: 32.9 G/DL (ref 31.5–36.5)
MCV RBC AUTO: 92 FL (ref 78–100)
MONOCYTES # BLD AUTO: 0.6 10E9/L (ref 0–1.3)
MONOCYTES NFR BLD AUTO: 8.1 %
NEUTROPHILS # BLD AUTO: 4.1 10E9/L (ref 1.6–8.3)
NEUTROPHILS NFR BLD AUTO: 54.1 %
PLATELET # BLD AUTO: 230 10E9/L (ref 150–450)
PROT SERPL-MCNC: 7.6 G/DL (ref 6.8–8.8)
RBC # BLD AUTO: 4.68 10E12/L (ref 3.8–5.2)
WBC # BLD AUTO: 7.5 10E9/L (ref 4–11)

## 2020-03-26 PROCEDURE — 85025 COMPLETE CBC W/AUTO DIFF WBC: CPT | Performed by: INTERNAL MEDICINE

## 2020-03-26 PROCEDURE — 82565 ASSAY OF CREATININE: CPT | Performed by: INTERNAL MEDICINE

## 2020-03-26 PROCEDURE — 86140 C-REACTIVE PROTEIN: CPT | Performed by: INTERNAL MEDICINE

## 2020-03-26 PROCEDURE — 80076 HEPATIC FUNCTION PANEL: CPT | Performed by: INTERNAL MEDICINE

## 2020-03-26 PROCEDURE — 36415 COLL VENOUS BLD VENIPUNCTURE: CPT | Performed by: INTERNAL MEDICINE

## 2020-03-26 PROCEDURE — 85652 RBC SED RATE AUTOMATED: CPT | Performed by: INTERNAL MEDICINE

## 2020-03-31 ENCOUNTER — MYC MEDICAL ADVICE (OUTPATIENT)
Dept: RHEUMATOLOGY | Facility: CLINIC | Age: 35
End: 2020-03-31

## 2020-03-31 NOTE — TELEPHONE ENCOUNTER
Rheumatology Telephone Note:    I called and spoke with MsThelma Forbes.    Will hold off on further Taltz for the time being. Check in at the end of the month at her scheduled appointment.    All questions were answered and she thanked me for the call.     Ronnie Cabrera MD  3/31/2020 5:15 PM

## 2020-04-23 ENCOUNTER — MYC MEDICAL ADVICE (OUTPATIENT)
Dept: RHEUMATOLOGY | Facility: CLINIC | Age: 35
End: 2020-04-23

## 2020-04-29 ENCOUNTER — VIRTUAL VISIT (OUTPATIENT)
Dept: RHEUMATOLOGY | Facility: CLINIC | Age: 35
End: 2020-04-29
Payer: COMMERCIAL

## 2020-04-29 DIAGNOSIS — L40.50 PSORIATIC ARTHRITIS (H): ICD-10-CM

## 2020-04-29 PROCEDURE — 99214 OFFICE O/P EST MOD 30 MIN: CPT | Mod: GT | Performed by: INTERNAL MEDICINE

## 2020-04-29 RX ORDER — SECUKINUMAB 150 MG/ML
150 INJECTION SUBCUTANEOUS
Qty: 1 ML | Refills: 5 | Status: SHIPPED | OUTPATIENT
Start: 2020-04-29 | End: 2020-08-05

## 2020-04-29 RX ORDER — SECUKINUMAB 150 MG/ML
150 INJECTION SUBCUTANEOUS WEEKLY
Qty: 5 ML | Refills: 0 | Status: SHIPPED | OUTPATIENT
Start: 2020-04-29 | End: 2020-08-05

## 2020-04-29 NOTE — Clinical Note
Please mail the letter I wrote dated 4/29/2020 to the patient.   Ronnie Cabrera MD  4/29/2020 1:31 PM

## 2020-04-29 NOTE — PROGRESS NOTES
"Shira Forbes is a 35 year old female who is being evaluated via a billable video visit.      The patient has been notified of following:     \"This video visit will be conducted via a call between you and your physician/provider. We have found that certain health care needs can be provided without the need for an in-person physical exam.  This service lets us provide the care you need with a video conversation.  If a prescription is necessary we can send it directly to your pharmacy.  If lab work is needed we can place an order for that and you can then stop by our lab to have the test done at a later time.    Video visits are billed at different rates depending on your insurance coverage.  Please reach out to your insurance provider with any questions.    If during the course of the call the physician/provider feels a video visit is not appropriate, you will not be charged for this service.\"    Patient has given verbal consent for Video visit? Yes    How would you like to obtain your AVS? [facility]    Patient would like the video invitation sent by: Text to cell phone: [phone]    Will anyone else be joining your video visit? No    Rheumatology Video Visit      Shira Forbes MRN# [id number]   YOB: 1985 Age: 35 year old      Date of visit: 4/29/20   PCP: Caleb, [facility]    Chief Complaint   Patient presents with:  RECHECK: psoriatic    Assessment and Plan     1.  Psoriatic Arthritis: Asymmetric arthritis involving her MCPs and PIP consistent with psoriatic arthritis; dx'd on 5/1/2019; prolonged morning stiffness; and pain and stiffness that improved with activity and worsened with inactivity.  She has dry skin on her hands that has an appearance more consistent with eczema than psoriasis initially.  Possible nail pitting on one fingernail initially.  RF, CCP, ANTONIO, dsDNA, PR3, and MPO negative; no cytopenias; normal renal function, ESR, and CRP.  SI joint x-rays suggest sacroiliitis " and are consistent with inflammatory back pain.  Hand and chest x-rays were okay.  Humira was started in May 2019 with significant improvement of joints and skin; but benefit began wearing off with symptoms worsening 7 days after dosing - worsening peripheral and axial symptoms; also with injection site reactions that have been worsening with each injection.  Therefore, stop Humira and Rx'd Enbrel but this was denied; her insurance required that she must fail ONE of cimzia, simponi, stelara, and both cosentyx and xeljanz. Therefore Simponi was used but she had worse skin symptoms and worse arthritis symptoms; so changed to Cosentyx but her insurance required Taltz first.  Taltz caused severe site reaction so it was not repeated.  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  Worsening arthritis symptoms now.  Discussed recommendations for working remotely, avoiding COVID19 patients, and starting tx and she is in agreement.  Letter written, available in [employer] and will be mailed.    - Start Cosentyx 150mg SQ weekly on weeks 0, 1, 2, 3, and 4 and then every 4 weeks thereafter   (premedicate with tylenol and benadryl)  - Labs in 3 months: CBC, Creatinine, Hepatic Panel, ESR, CRP    # Cosentyx (Secukinumab) Risks and Benefits: The risks and benefits of secukinumab were discussed in detail and the patient verbalized understanding.  The risks discussed include, but are not limited to, the risk for hypersensitivity, anaphylaxis, anaphylactoid reactions, an increased risk for serious infections leading to hospitalization or death, a possible increased risk for lymphoma and other malignancies, possible reactivation of hepatitis B, and possible reactivation of latent tuberculosis.  Subcutaneous injections may result in injection site reactions and/or pain at the site of injection.  It was discussed that the medication would need to be discontinued if a serious infection develops.  It was  "discussed that live vaccinations should not be received while using secukinumab or within 30 days prior to starting secukinumab.  I encouraged reviewing the package insert and asking any questions about the medication.      # Relevant labs and imaging were reviewed with the patient    # High risk medication toxicity monitoring: discussion and labs reviewed; appropriate labs ordered. See above    # Note that this is a virtual visit to reduce the risk of COVID-19 exposure during this current pandemic.        Ms. Forbes verbalized agreement with and understanding of the rational for the diagnosis and treatment plan.  All questions were answered to best of my ability and the patient's satisfaction. Ms. Forbes was advised to contact the clinic with any questions that may arise after the clinic visit.      Thank you for involving me in the care of the patient    Return to clinic: 3-4 months    HPI   Shira Forbes is a 35 year old female with a past medical history significant for contact dermatitis, vitamin D deficiency, and axiety who is seen for follow-up of psoriatic arthritis    MyChart message  \"I just want to send you a quick update and a thank you! I have been on Humira for 1 month now and feel amazing! I didn't actually realize how miserable I was.  I am sleeping better at night, not exhausted and can move easier.  Also my skin on my hands is totally clear and has been since after my first dose (this has been huge for me!!!!) Thank you so much! Will see you in September.  Truly life changing! Thank you! Jason\"    9/2019: Ms. Forbes reported that fairly quickly with Humira her arthritis improved.  Morning stiffness for less than 5-10 minutes.  Positive gelling phenomenon that was reduced in severity.  Everything got better including her hands where she no longer had cracking of her skin on the hands.  However, after subsequent dosing from Humira, approximately after 1.5-2 months of using it, the duration " "of benefit to started to weekend.  Symptoms began returning approximately 7 days after taking the Humira dose.  She also started having injection site reactions that have been worsening with each injection.      1/15/2020: Not doing as well as when she was on Humira.  Some \"fogginess\" with Simponi.  Has been on Simponi for more than 3 months.  Feels like her toes are swollen.  Hands have not been doing well; she says when she was on Humira her hands completely cleared up with regard to her skin.  Joint pain worse in the morning and improves with time and activity.  Back and hips are doing okay but other joints are not.  Would like to change medication.    Insurance required Taltz before cosentyx. Taltz resulted in site reaction.  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    Today, worse ankle/hand > lower back pain and stiffness and she'd like to restart tx.  AM is worse; activity helps. Prolonged morning stiffness.     Denies fevers, chills, nausea, vomiting, constipation, diarrhea. No abdominal pain. No chest pain/pressure, palpitations, or shortness of breath. No LE swelling. No neck pain. No oral or nasal sores.  No sicca symptoms.      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[employer]   GEN: No fevers, chills, night sweats, or weight change  SKIN: See HPI  HEENT: No epistaxis. No oral or nasal ulcers.  CV: No chest pain, pressure, palpitations, or dyspnea on exertion.  PULM: No SOB, wheeze, cough.  GI: No nausea, vomiting, constipation, diarrhea. No blood in stool. No abdominal pain.  : No blood in urine.  MSK: See HPI.  NEURO: No numbness or tingling  ENDO: No heat/cold intolerance.  EXT: No LE swelling  PSYCH: Negative    Active Problem List     Patient Active Problem List   Diagnosis     Contact dermatitis and other eczema, due to unspecified cause     Vitamin D deficiency     CARDIOVASCULAR " SCREENING; LDL GOAL LESS THAN 160     Malrotation of intestine     S/P appy     H/O left inguinal hernia repair     Anxiety     Menorrhagia     Admission for sterilization     Psoriatic arthritis (H)     Sacroiliitis (H)     Past Medical History     Past Medical History:   Diagnosis Date     Chickenpox      PONV (postoperative nausea and vomiting)      Past Surgical History     Past Surgical History:   Procedure Laterality Date     APPENDECTOMY       DILATION AND CURETTAGE, HYSTEROSCOPY, ABLATE ENDOMETRIUM, COMBINED N/A 5/20/2019    Procedure: HYSTEROSCOPY, WITH DILATION AND CURETTAGE  AND ENDOMETRIAL ABLATION, Laparoscopic Bilateral Tubal Ligation;  Surgeon: Mc Purvis MD;  Location: [facility]     LAPAROSCOPIC HERNIORRHAPHY INGUINAL  3/28/2012    Procedure:LAPAROSCOPIC HERNIORRHAPHY INGUINAL; Laparoscopic Left Inguinal Herniorrhaphy Possible Right Inguinal Hernia; Surgeon:EMMANUELLE CHAPPELL; Location:[facility]     LAPAROSCOPIC TUBAL LIGATION Bilateral 5/20/2019    Procedure: Laparoscopic Tubal Sterilization;  Surgeon: Mc Purvis MD;  Location: [facility]     SURGICAL HISTORY OF -   05/20/06    D&C for incomplete AB     Allergy     Allergies   Allergen Reactions     Keflex [Cephalexin Monohydrate] Rash     Morphine Hives     Shellfish Allergy      Sulfa Drugs Rash     Current Medication List     Current Outpatient Medications   Medication Sig     Cholecalciferol (D3 VITAMIN PO)      clobetasol (TEMOVATE) 0.05 % external ointment Apply sparingly to affected area twice daily as needed.  Do not apply to face.     DiphenhydrAMINE HCl (DIPHENDRYL PO) Take 25 mg by mouth nightly as needed      escitalopram (LEXAPRO) 5 MG tablet Take 1 tablet (5 mg) by mouth daily     IBUPROFEN PO      naproxen (NAPROSYN) 375 MG tablet Take 1 tablet (375 mg) by mouth every 12 hours as needed for other (Inflammatory pain)     Omega-3 Fatty Acids (FISH OIL PO)      triamcinolone (KENALOG) 0.1 % external ointment Apply sparingly to  affected area three times daily for 14 days.     azithromycin (ZITHROMAX) 250 MG tablet Two tablets first day, then one tablet daily for four days. (Patient not taking: Reported on 4/29/2020)     ixekizumab (TALTZ) 80 MG/ML SOAJ auto-injector Inject 160mg (2 Autojets) subcutaneously at week 0, then 80mg (1 Autojet) every 4 weeks. Hold for signs of infection, and seek medical attention. (Patient not taking: Reported on 4/29/2020)     ixekizumab (TALTZ) 80 MG/ML SOAJ auto-injector Inject 1 mL (80 mg) Subcutaneous every 28 days . Hold for signs of infection, and seek medical attention. (Patient not taking: Reported on 4/29/2020)     No current facility-administered medications for this visit.          Social History   See HPI    Family History     Family History   Problem Relation Age of Onset     Psychotic Disorder Maternal Grandmother      Alcohol/Drug Maternal Grandmother         alcohol     Heart Disease Maternal Grandfather         MI     Hypertension Maternal Grandfather      Diabetes Maternal Grandfather      Arthritis Paternal Grandmother         RA     Lipids Paternal Grandmother      Parkinsonism Paternal Grandfather      Heart Disease Paternal Grandfather         pacemaker     Lymphoma Cousin      Breast Cancer Maternal Aunt      Paternal grandmother: RA  Paternal aunt: lupus  Paternal cousin: RA    Physical Exam     Temp Readings from Last 3 Encounters:   02/03/20 98.9  F (37.2  C) (Tympanic)   05/20/19 98.1  F (36.7  C) (Oral)   05/08/19 97.6  F (36.4  C)     BP Readings from Last 5 Encounters:   02/03/20 122/82   01/15/20 136/82   09/11/19 127/87   05/20/19 112/86   05/08/19 127/84     Pulse Readings from Last 1 Encounters:   02/03/20 86     Resp Readings from Last 1 Encounters:   02/03/20 20     Estimated body mass index is 26.76 kg/m  as calculated from the following:    Height as of 2/3/20: 1.524 m (5').    Weight as of 2/3/20: 62.1 kg (137 lb).      GEN: NAD  HEENT: MMM.  Anicteric, noninjected  sclera  PULM: No increased work of breathing  PSYCH: Alert. Appropriate.        Labs / Imaging (select studies)   RF/CCP  Recent Labs   Lab Test 12/12/18 0920   CCPIGG 1   RHF <20     CBC  Recent Labs   Lab Test 01/13/20  0824 09/10/19  0820 12/12/18  0920  12/21/14  0847   WBC 6.0 7.1 7.3   < > 7.5   RBC 4.98 4.77 4.61   < > 4.77   HGB 15.2 14.7 14.2   < > 14.1   HCT 44.9 43.3 42.3   < > 41.5   MCV 90 91 92   < > 87   RDW 13.1 13.2 13.0   < > 12.3    249 255   < > 282   MCH 30.5 30.8 30.8   < > 29.6   MCHC 33.9 33.9 33.6   < > 34.0   NEUTROPHIL 52.8 54.0  --   --  68.2   LYMPH 33.7 34.3  --   --  22.2   MONOCYTE 8.1 6.2  --   --  6.4   EOSINOPHIL 5.1 5.1  --   --  2.5   BASOPHIL 0.3 0.4  --   --  0.4   ANEU 3.2 3.8  --   --  5.1   ALYM 2.0 2.4  --   --  1.7   LAMONT 0.5 0.4  --   --  0.5   AEOS 0.3 0.4  --   --  0.2   ABAS 0.0 0.0  --   --  0.0    < > = values in this interval not displayed.     CMP  Recent Labs   Lab Test 01/13/20  0824 09/10/19  0820 12/12/18  0920 05/22/17  1612 12/21/14  0847   NA  --   --  138 136 137   POTASSIUM  --   --  4.0 3.8 3.5   CHLORIDE  --   --  106 105 107   CO2  --   --  27 22 24   ANIONGAP  --   --  5 9 6   GLC  --   --  88 100* 90   BUN  --   --  11 12 9   CR 0.76 0.72 0.70 0.67 0.63   GFRESTIMATED >90 >90 >90 >90  Non  GFR Calc   >90  Non  GFR Calc     GFRESTBLACK >90 >90 >90 >90   GFR Calc   >90   GFR Calc     BERNARDO  --   --  8.6 8.7 8.7   BILITOTAL 0.6 0.3 0.3 0.2 0.3   ALBUMIN 3.9 4.1 4.1 4.1 3.8   PROTTOTAL 7.5 7.6 7.3 7.6 7.5   ALKPHOS 40 52 44 47 46   AST 13 13 10 14 7   ALT 21 20 21 16 18     Calcium/VitaminD  Recent Labs   Lab Test 12/12/18  0920 05/22/17  1612 12/21/14  0847   BERNARDO 8.6 8.7 8.7     ESR/CRP  Recent Labs   Lab Test 01/13/20  0824 09/10/19  0820 12/12/18  0920   SED 5 4 4   CRP <2.9 <2.9 <2.9     Hepatitis B  Recent Labs   Lab Test 05/02/19  1400 01/06/15  1051   HBCAB Nonreactive  --     HEPBANG  --  Nonreactive     Hepatitis C  Recent Labs   Lab Test 05/02/19  1400   HCVAB Nonreactive     Lyme ab screening  Recent Labs   Lab Test 12/12/18  0920   LYMEGM 0.04       Tuberculosis Screening  Recent Labs   Lab Test 05/02/19  1400   TBRES Negative     HIV Screening  Recent Labs   Lab Test 01/06/15  1051   HIAGAB Nonreactive   HIV-1 p24 Ag & HIV-1/HIV-2 Ab Not Detected       Immunization History     Immunization History   Administered Date(s) Administered     Influenza (IIV3) PF 10/15/2008, 10/19/2012     Influenza Vaccine IM > 6 months Valent IIV4 09/29/2015, 09/26/2016, 10/08/2019     Pneumo Conj 13-V (2010&after) 05/08/2019     Pneumococcal 23 valent 09/11/2019     TD (ADULT, 7+) 02/01/2004     TDAP Vaccine (Adacel) 05/26/2015     Zoster vaccine recombinant adjuvanted (SHINGRIX) 10/14/2019          Chart documentation done in part with Dragon Voice recognition Software. Although reviewed after completion, some word and grammatical error may remain.      Video-Visit Details    Type of service:  Video Visit    Video Start Time: 1:00 PM  Video End Time: 1:18 PM    Originating Location (pt. Location): Home    Distant Location (provider location):  Home    Mode of Communication:  Video Conference via Doximity; used phone too because auto quality was poor      Ronnie Cabrera MD

## 2020-04-29 NOTE — LETTER
To Whom It May Concern:    Shira Forbes is followed at the [facility] and is considered to be at high risk of complications from the COVID-19 virus.  It is recommended to limit contact with other people and if possible to work remotely or provide a leave of absence to reduce the risk for COVID-19.      Sincerely,  Ronnie Cabrera MD  [facility]  4/29/2020

## 2020-04-30 ENCOUNTER — TELEPHONE (OUTPATIENT)
Dept: RHEUMATOLOGY | Facility: CLINIC | Age: 35
End: 2020-04-30

## 2020-04-30 NOTE — TELEPHONE ENCOUNTER
PA Initiation    Medication: cosentyx  Insurance Company: [employer] - Phone [phone] Fax [phone]  Pharmacy Filling the Rx: [facility] - [place], [place] - [facility] [address]  Filling Pharmacy Phone:    Filling Pharmacy Fax:    Start Date: 4/30/2020

## 2020-04-30 NOTE — PROGRESS NOTES
Letter from Dr. Cabrera has been mailed to patient.  Samaria Ye CMA Rheumatology  4/30/2020 10:27 AM

## 2020-05-01 NOTE — TELEPHONE ENCOUNTER
PRIOR AUTHORIZATION DENIED    Medication: cosentyx- DENIED    Denial Date:  4/30/2020     Denial Rational: Must try and fail one of the following: Orencia or Xeljanz    Appeal Information: Fax to [employer] [phone]

## 2020-05-03 NOTE — TELEPHONE ENCOUNTER
Please appeal.  She has axial disease so Xeljanz and Orencia are not the right agents to use.      Ronnie Cabrera MD  5/3/2020 1:00 PM

## 2020-05-04 NOTE — TELEPHONE ENCOUNTER
Medication Appeal Initiation    We have initiated an appeal for the requested medication:  Medication: cosentyx  Appeal Start Date:  5/4/2020  Insurance Company: [employer]   Comments:

## 2020-05-04 NOTE — TELEPHONE ENCOUNTER
MEDICATION APPEAL APPROVED    Medication: cosentyx  Authorization Effective Date: 5/4/2020  Authorization Expiration Date: 5/4/2021  Approved Dose/Quantity: loading and maintenance   Reference #: [id number]   Insurance Company:    Expected CoPay: $123      CoPay Card Available: Yes    Foundation Assistance Needed: na  Which Pharmacy is filling the prescription (Not needed for infusion/clinic administered): [facility] - [place], [place] - [address]

## 2020-05-06 ENCOUNTER — APPOINTMENT (OUTPATIENT)
Dept: LAB | Facility: CLINIC | Age: 35
End: 2020-05-06
Payer: COMMERCIAL

## 2020-05-06 ENCOUNTER — RESULTS ONLY (OUTPATIENT)
Dept: LAB | Age: 35
End: 2020-05-06

## 2020-05-07 LAB
COVID-19 SPIKE RBD ABY TITER: NORMAL
COVID-19 SPIKE RBD ABY: NEGATIVE

## 2020-08-05 ENCOUNTER — DOCUMENTATION ONLY (OUTPATIENT)
Dept: LAB | Facility: CLINIC | Age: 35
End: 2020-08-05

## 2020-08-05 ENCOUNTER — VIRTUAL VISIT (OUTPATIENT)
Dept: RHEUMATOLOGY | Facility: CLINIC | Age: 35
End: 2020-08-05
Payer: COMMERCIAL

## 2020-08-05 DIAGNOSIS — L40.50 PSORIATIC ARTHRITIS (H): Primary | ICD-10-CM

## 2020-08-05 DIAGNOSIS — Z79.899 HIGH RISK MEDICATION USE: ICD-10-CM

## 2020-08-05 PROCEDURE — 99213 OFFICE O/P EST LOW 20 MIN: CPT | Mod: 95 | Performed by: INTERNAL MEDICINE

## 2020-08-05 RX ORDER — SECUKINUMAB 150 MG/ML
150 INJECTION SUBCUTANEOUS
Qty: 1 ML | Refills: 7 | Status: SHIPPED | OUTPATIENT
Start: 2020-08-05 | End: 2020-09-25

## 2020-08-05 NOTE — PROGRESS NOTES
"Shira Forbes is a 35 year old female who is being evaluated via a billable video visit.      The patient has been notified of following:     \"This video visit will be conducted via a call between you and your physician/provider. We have found that certain health care needs can be provided without the need for an in-person physical exam.  This service lets us provide the care you need with a video conversation.  If a prescription is necessary we can send it directly to your pharmacy.  If lab work is needed we can place an order for that and you can then stop by our lab to have the test done at a later time.    Video visits are billed at different rates depending on your insurance coverage.  Please reach out to your insurance provider with any questions.    If during the course of the call the physician/provider feels a video visit is not appropriate, you will not be charged for this service.\"    Patient has given verbal consent for Video visit? Yes  How would you like to obtain your AVS? MyChart  If you are dropped from the video visit, the video invite should be resent to: Text to cell phone: [phone]  Will anyone else be joining your video visit? No    Rheumatology Video Visit      Shira Forbes MRN# [id number]   YOB: 1985 Age: 35 year old      Date of visit: 8/05/20   PCP: Caleb, [facility]    Chief Complaint   Patient presents with:  Arthritis: Psoriatic    Assessment and Plan     1.  Psoriatic Arthritis: Asymmetric arthritis involving her MCPs and PIP consistent with psoriatic arthritis; dx'd on 5/1/2019; prolonged morning stiffness; and pain and stiffness that improved with activity and worsened with inactivity.  She has dry skin on her hands that has an appearance more consistent with eczema than psoriasis initially.  Possible nail pitting on one fingernail initially.  RF, CCP, ANTONIO, dsDNA, PR3, and MPO negative; no cytopenias; normal renal function, ESR, and CRP.  SI joint " x-rays suggest sacroiliitis and are consistent with inflammatory back pain.  Hand and chest x-rays were okay.  Humira was started in May 2019 with significant improvement of joints and skin; but benefit began wearing off with symptoms worsening 7 days after dosing - worsening peripheral and axial symptoms; also with injection site reactions that have been worsening with each injection.  Therefore, stop Humira and Rx'd Enbrel but this was denied; her insurance required that she must fail ONE of cimzia, simponi, stelara, and both cosentyx and xeljanz. Therefore Simponi was used but she had worse skin symptoms and worse arthritis symptoms; so changed to Cosentyx but her insurance required Taltz first.  Taltz caused severe site reaction so it was not repeated.  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  Worsening arthritis symptoms now, so Cosentyx was started and she is improved. Hand rash seems to be more associated with washing and chemicals from work; mild plantar fasciitis symptoms now. Continue consentyx.   - Continue Cosentyx 150mg SQ every 4 weeks    (premedicate with tylenol and benadryl)  - Labs within a couple weeks: CBC, Creatinine, Hepatic Panel, ESR, CRP    # Relevant labs and imaging were reviewed with the patient    # High risk medication toxicity monitoring: discussion and labs reviewed; appropriate labs ordered. See above.  Instructed that if confirmed to have COVID-19 or exposure to someone with confirmed COVID-19 to call this clinic for directions on DMARD management.    # Note that this is a virtual visit to reduce the risk of COVID-19 exposure during this current pandemic.      # Considered to be at high risk of complications from the COVID-19 virus.  It is recommended to limit contact with other people and if possible to work remotely or provide a leave of absence to reduce the risk for COVID-19.      Ms. Forbes verbalized agreement with and understanding of  "the rational for the diagnosis and treatment plan.  All questions were answered to best of my ability and the patient's satisfaction. Ms. Forbes was advised to contact the clinic with any questions that may arise after the clinic visit.      Thank you for involving me in the care of the patient    Return to clinic: 6 mo    HPI   Shira Forbes is a 35 year old female with a past medical history significant for contact dermatitis, vitamin D deficiency, and axiety who is seen for follow-up of psoriatic arthritis    MyChart message  \"I just want to send you a quick update and a thank you! I have been on Humira for 1 month now and feel amazing! I didn't actually realize how miserable I was.  I am sleeping better at night, not exhausted and can move easier.  Also my skin on my hands is totally clear and has been since after my first dose (this has been huge for me!!!!) Thank you so much! Will see you in September.  Truly life changing! Thank you! Jason\"    9/2019: Ms. Forbes reported that fairly quickly with Humira her arthritis improved.  Morning stiffness for less than 5-10 minutes.  Positive gelling phenomenon that was reduced in severity.  Everything got better including her hands where she no longer had cracking of her skin on the hands.  However, after subsequent dosing from Humira, approximately after 1.5-2 months of using it, the duration of benefit to started to weekend.  Symptoms began returning approximately 7 days after taking the Humira dose.  She also started having injection site reactions that have been worsening with each injection.      1/15/2020: Not doing as well as when she was on Humira.  Some \"fogginess\" with Simponi.  Has been on Simponi for more than 3 months.  Feels like her toes are swollen.  Hands have not been doing well; she says when she was on Humira her hands completely cleared up with regard to her skin.  Joint pain worse in the morning and improves with time and activity.  Back " and hips are doing okay but other joints are not.  Would like to change medication.    Insurance required Taltz before cosentyx. Taltz resulted in site reaction.  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    4/29/2020: worse ankle/hand > lower back pain and stiffness and she'd like to restart tx.  AM is worse; activity helps. Prolonged morning stiffness.     Today, 8/5/2020: feels much better on Cosentyx. Still mild lower back, right plantar fasciitis symptoms, but overall much better. Rash seems to be associated with exposures at work; affecting the hands. Tolerating cosentyx. Back to work now.     Denies fevers, chills, nausea, vomiting, constipation, diarrhea. No abdominal pain. No chest pain/pressure, palpitations, or shortness of breath. No LE swelling. No neck pain. No oral or nasal sores.  No sicca symptoms.      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[employer]   GEN: No fevers, chills, night sweats, or weight change  SKIN: See HPI  HEENT: No epistaxis. No oral or nasal ulcers.  CV: No chest pain, pressure, palpitations, or dyspnea on exertion.  PULM: No SOB, wheeze, cough.  GI: No nausea, vomiting, constipation, diarrhea. No blood in stool. No abdominal pain.  : No blood in urine.  MSK: See HPI.  NEURO: No numbness or tingling  ENDO: No heat/cold intolerance.  EXT: No LE swelling  PSYCH: Negative    Active Problem List     Patient Active Problem List   Diagnosis     Contact dermatitis and other eczema, due to unspecified cause     Vitamin D deficiency     CARDIOVASCULAR SCREENING; LDL GOAL LESS THAN 160     Malrotation of intestine     S/P appy     H/O left inguinal hernia repair     Anxiety     Menorrhagia     Admission for sterilization     Psoriatic arthritis (H)     Sacroiliitis (H)     Past Medical History     Past Medical History:   Diagnosis Date     Chickenpox      PONV (postoperative nausea  and vomiting)      Past Surgical History     Past Surgical History:   Procedure Laterality Date     APPENDECTOMY       DILATION AND CURETTAGE, HYSTEROSCOPY, ABLATE ENDOMETRIUM, COMBINED N/A 5/20/2019    Procedure: HYSTEROSCOPY, WITH DILATION AND CURETTAGE  AND ENDOMETRIAL ABLATION, Laparoscopic Bilateral Tubal Ligation;  Surgeon: Mc Purvis MD;  Location: [facility]     LAPAROSCOPIC HERNIORRHAPHY INGUINAL  3/28/2012    Procedure:LAPAROSCOPIC HERNIORRHAPHY INGUINAL; Laparoscopic Left Inguinal Herniorrhaphy Possible Right Inguinal Hernia; Surgeon:EMMANUELLE CHAPPELL; Location:[facility]     LAPAROSCOPIC TUBAL LIGATION Bilateral 5/20/2019    Procedure: Laparoscopic Tubal Sterilization;  Surgeon: Mc Purvis MD;  Location: [facility]     SURGICAL HISTORY OF -   05/20/06    D&C for incomplete AB     Allergy     Allergies   Allergen Reactions     Keflex [Cephalexin Monohydrate] Rash     Morphine Hives     Shellfish Allergy      Sulfa Drugs Rash     Current Medication List     Current Outpatient Medications   Medication Sig     Cholecalciferol (D3 VITAMIN PO)      clobetasol (TEMOVATE) 0.05 % external ointment Apply sparingly to affected area twice daily as needed.  Do not apply to face.     DiphenhydrAMINE HCl (DIPHENDRYL PO) Take 25 mg by mouth nightly as needed      IBUPROFEN PO      naproxen (NAPROSYN) 375 MG tablet Take 1 tablet (375 mg) by mouth every 12 hours as needed for other (Inflammatory pain)     Omega-3 Fatty Acids (FISH OIL PO)      secukinumab (COSENTYX SENSOREADY PEN) 150 MG/ML Sensoready pen Inject 1 mL (150 mg) Subcutaneous every 28 days . Hold for signs of infection, and seek medical attention.     triamcinolone (KENALOG) 0.1 % external ointment Apply sparingly to affected area three times daily for 14 days.     azithromycin (ZITHROMAX) 250 MG tablet Two tablets first day, then one tablet daily for four days. (Patient not taking: Reported on 4/29/2020)     escitalopram (LEXAPRO) 5 MG tablet Take  1 tablet (5 mg) by mouth daily     secukinumab (COSENTYX SENSOREADY PEN) 150 MG/ML Sensoready pen Inject 1 mL (150 mg) Subcutaneous once a week on weeks 0, 1, 2, 3, and 4 and every 4 weeks there after loading dose.     No current facility-administered medications for this visit.          Social History   See HPI    Family History     Family History   Problem Relation Age of Onset     Psychotic Disorder Maternal Grandmother      Alcohol/Drug Maternal Grandmother         alcohol     Heart Disease Maternal Grandfather         MI     Hypertension Maternal Grandfather      Diabetes Maternal Grandfather      Arthritis Paternal Grandmother         RA     Lipids Paternal Grandmother      Parkinsonism Paternal Grandfather      Heart Disease Paternal Grandfather         pacemaker     Lymphoma Cousin      Breast Cancer Maternal Aunt      Paternal grandmother: RA  Paternal aunt: lupus  Paternal cousin: RA    Physical Exam     Temp Readings from Last 3 Encounters:   02/03/20 98.9  F (37.2  C) (Tympanic)   05/20/19 98.1  F (36.7  C) (Oral)   05/08/19 97.6  F (36.4  C)     BP Readings from Last 5 Encounters:   02/03/20 122/82   01/15/20 136/82   09/11/19 127/87   05/20/19 112/86   05/08/19 127/84     Pulse Readings from Last 1 Encounters:   02/03/20 86     Resp Readings from Last 1 Encounters:   02/03/20 20     Estimated body mass index is 26.76 kg/m  as calculated from the following:    Height as of 2/3/20: 1.524 m (5').    Weight as of 2/3/20: 62.1 kg (137 lb).        GEN: NAD. Healthy appearing adult.   HEENT: MMM.  Anicteric, noninjected sclera. No obvious external lesions of the ear and nose. Hearing intact.  PULM: No increased work of breathing; no use of accessory muscles.  MSK:  Hands and wrists without swelling.  PSYCH: Alert. Appropriate.        Labs / Imaging (select studies)   RF/CCP  Recent Labs   Lab Test 12/12/18  0920   CCPIGG 1   RHF <20     CBC  Recent Labs   Lab Test 03/26/20  0829 01/13/20  0824 09/10/19  0820    WBC 7.5 6.0 7.1   RBC 4.68 4.98 4.77   HGB 14.2 15.2 14.7   HCT 43.2 44.9 43.3   MCV 92 90 91   RDW 13.5 13.1 13.2    258 249   MCH 30.3 30.5 30.8   MCHC 32.9 33.9 33.9   NEUTROPHIL 54.1 52.8 54.0   LYMPH 29.2 33.7 34.3   MONOCYTE 8.1 8.1 6.2   EOSINOPHIL 8.1 5.1 5.1   BASOPHIL 0.5 0.3 0.4   ANEU 4.1 3.2 3.8   ALYM 2.2 2.0 2.4   LAMONT 0.6 0.5 0.4   AEOS 0.6 0.3 0.4   ABAS 0.0 0.0 0.0     CMP  Recent Labs   Lab Test 03/26/20  0829 01/13/20  0824 09/10/19  0820 12/12/18 0920 05/22/17  1612 12/21/14  0847   NA  --   --   --  138 136 137   POTASSIUM  --   --   --  4.0 3.8 3.5   CHLORIDE  --   --   --  106 105 107   CO2  --   --   --  27 22 24   ANIONGAP  --   --   --  5 9 6   GLC  --   --   --  88 100* 90   BUN  --   --   --  11 12 9   CR 0.78 0.76 0.72 0.70 0.67 0.63   GFRESTIMATED >90 >90 >90 >90 >90  Non  GFR Calc   >90  Non  GFR Calc     GFRESTBLACK >90 >90 >90 >90 >90   GFR Calc   >90   GFR Calc     BERNARDO  --   --   --  8.6 8.7 8.7   BILITOTAL 0.4 0.6 0.3 0.3 0.2 0.3   ALBUMIN 3.9 3.9 4.1 4.1 4.1 3.8   PROTTOTAL 7.6 7.5 7.6 7.3 7.6 7.5   ALKPHOS 45 40 52 44 47 46   AST 13 13 13 10 14 7   ALT 25 21 20 21 16 18     Calcium/VitaminD  Recent Labs   Lab Test 12/12/18  0920 05/22/17  1612 12/21/14  0847   BERNARDO 8.6 8.7 8.7     ESR/CRP  Recent Labs   Lab Test 03/26/20  0829 01/13/20  0824 09/10/19  0820   SED 5 5 4   CRP <2.9 <2.9 <2.9     Hepatitis B  Recent Labs   Lab Test 05/02/19  1400 01/06/15  1051   HBCAB Nonreactive  --    HEPBANG  --  Nonreactive     Hepatitis C  Recent Labs   Lab Test 05/02/19  1400   HCVAB Nonreactive     Lyme ab screening  Recent Labs   Lab Test 12/12/18  0920   LYMEGM 0.04       Tuberculosis Screening  Recent Labs   Lab Test 05/02/19  1400   TBRES Negative     HIV Screening  Recent Labs   Lab Test 01/06/15  1051   HIAGAB Nonreactive   HIV-1 p24 Ag & HIV-1/HIV-2 Ab Not Detected         Immunization History     Immunization  History   Administered Date(s) Administered     Influenza (IIV3) PF 10/15/2008, 10/19/2012     Influenza Vaccine IM > 6 months Valent IIV4 09/29/2015, 09/26/2016, 10/08/2019     Pneumo Conj 13-V (2010&after) 05/08/2019     Pneumococcal 23 valent 09/11/2019     TD (ADULT, 7+) 02/01/2004     TDAP Vaccine (Adacel) 05/26/2015     Zoster vaccine recombinant adjuvanted (SHINGRIX) 10/14/2019          Chart documentation done in part with Dragon Voice recognition Software. Although reviewed after completion, some word and grammatical error may remain.      Video-Visit Details    Type of service:  Video Visit    Video Start Time: 3:16 PM  Video End Time: 3:23 PM    Originating Location (pt. Location): Home in [place]    Distant Location (provider location):  Home    Platform used for Video Visit: Sukh Cabrera MD

## 2020-08-05 NOTE — PROGRESS NOTES
Dr. Cabrera's 4/29/20 visit notes state: - Labs in 3 months: CBC, Creatinine, Hepatic Panel, ESR, CRP. RN placed lab orders accordingly.    Gerard Jolly RN....8/5/2020 10:15 AM

## 2020-08-07 DIAGNOSIS — Z79.899 HIGH RISK MEDICATION USE: ICD-10-CM

## 2020-08-07 DIAGNOSIS — L40.50 PSORIATIC ARTHRITIS (H): ICD-10-CM

## 2020-08-07 LAB
ALBUMIN SERPL-MCNC: 3.8 G/DL (ref 3.4–5)
ALP SERPL-CCNC: 52 U/L (ref 40–150)
ALT SERPL W P-5'-P-CCNC: 19 U/L (ref 0–50)
AST SERPL W P-5'-P-CCNC: 13 U/L (ref 0–45)
BASOPHILS # BLD AUTO: 0 10E9/L (ref 0–0.2)
BASOPHILS NFR BLD AUTO: 0.4 %
BILIRUB DIRECT SERPL-MCNC: 0.1 MG/DL (ref 0–0.2)
BILIRUB SERPL-MCNC: 0.4 MG/DL (ref 0.2–1.3)
CREAT SERPL-MCNC: 0.82 MG/DL (ref 0.52–1.04)
CRP SERPL-MCNC: <2.9 MG/L (ref 0–8)
DIFFERENTIAL METHOD BLD: NORMAL
EOSINOPHIL # BLD AUTO: 0.3 10E9/L (ref 0–0.7)
EOSINOPHIL NFR BLD AUTO: 6 %
ERYTHROCYTE [DISTWIDTH] IN BLOOD BY AUTOMATED COUNT: 12.7 % (ref 10–15)
ERYTHROCYTE [SEDIMENTATION RATE] IN BLOOD BY WESTERGREN METHOD: 4 MM/H (ref 0–20)
GFR SERPL CREATININE-BSD FRML MDRD: >90 ML/MIN/{1.73_M2}
HCT VFR BLD AUTO: 41.6 % (ref 35–47)
HGB BLD-MCNC: 13.7 G/DL (ref 11.7–15.7)
LYMPHOCYTES # BLD AUTO: 1.3 10E9/L (ref 0.8–5.3)
LYMPHOCYTES NFR BLD AUTO: 25.6 %
MCH RBC QN AUTO: 29.9 PG (ref 26.5–33)
MCHC RBC AUTO-ENTMCNC: 32.9 G/DL (ref 31.5–36.5)
MCV RBC AUTO: 91 FL (ref 78–100)
MONOCYTES # BLD AUTO: 0.5 10E9/L (ref 0–1.3)
MONOCYTES NFR BLD AUTO: 8.8 %
NEUTROPHILS # BLD AUTO: 3.1 10E9/L (ref 1.6–8.3)
NEUTROPHILS NFR BLD AUTO: 59.2 %
PLATELET # BLD AUTO: 224 10E9/L (ref 150–450)
PROT SERPL-MCNC: 7.2 G/DL (ref 6.8–8.8)
RBC # BLD AUTO: 4.58 10E12/L (ref 3.8–5.2)
WBC # BLD AUTO: 5.2 10E9/L (ref 4–11)

## 2020-08-07 PROCEDURE — 86481 TB AG RESPONSE T-CELL SUSP: CPT | Performed by: FAMILY MEDICINE

## 2020-08-07 PROCEDURE — 85025 COMPLETE CBC W/AUTO DIFF WBC: CPT | Performed by: FAMILY MEDICINE

## 2020-08-07 PROCEDURE — 82565 ASSAY OF CREATININE: CPT | Performed by: FAMILY MEDICINE

## 2020-08-07 PROCEDURE — 80076 HEPATIC FUNCTION PANEL: CPT | Performed by: FAMILY MEDICINE

## 2020-08-07 PROCEDURE — 86140 C-REACTIVE PROTEIN: CPT | Performed by: FAMILY MEDICINE

## 2020-08-07 PROCEDURE — 85652 RBC SED RATE AUTOMATED: CPT | Performed by: FAMILY MEDICINE

## 2020-08-07 PROCEDURE — 36415 COLL VENOUS BLD VENIPUNCTURE: CPT | Performed by: FAMILY MEDICINE

## 2020-08-10 LAB
GAMMA INTERFERON BACKGROUND BLD IA-ACNC: 0.08 IU/ML
M TB IFN-G CD4+ BCKGRND COR BLD-ACNC: 9.92 IU/ML
M TB TUBERC IFN-G BLD QL: NEGATIVE
MITOGEN IGNF BCKGRD COR BLD-ACNC: 0.03 IU/ML
MITOGEN IGNF BCKGRD COR BLD-ACNC: 0.03 IU/ML

## 2020-09-04 ENCOUNTER — MYC REFILL (OUTPATIENT)
Dept: FAMILY MEDICINE | Facility: CLINIC | Age: 35
End: 2020-09-04

## 2020-09-04 DIAGNOSIS — L25.9 CONTACT DERMATITIS AND ECZEMA: ICD-10-CM

## 2020-09-04 RX ORDER — TRIAMCINOLONE ACETONIDE 1 MG/G
OINTMENT TOPICAL
Qty: 80 G | Refills: 1 | Status: SHIPPED | OUTPATIENT
Start: 2020-09-04

## 2020-09-04 RX ORDER — CLOBETASOL PROPIONATE 0.5 MG/G
OINTMENT TOPICAL
Qty: 60 G | Refills: 1 | Status: SHIPPED | OUTPATIENT
Start: 2020-09-04

## 2020-10-27 DIAGNOSIS — Z20.822 EXPOSURE TO COVID-19 VIRUS: ICD-10-CM

## 2020-10-27 DIAGNOSIS — Z20.822 EXPOSURE TO COVID-19 VIRUS: Primary | ICD-10-CM

## 2020-10-27 PROCEDURE — U0003 INFECTIOUS AGENT DETECTION BY NUCLEIC ACID (DNA OR RNA); SEVERE ACUTE RESPIRATORY SYNDROME CORONAVIRUS 2 (SARS-COV-2) (CORONAVIRUS DISEASE [COVID-19]), AMPLIFIED PROBE TECHNIQUE, MAKING USE OF HIGH THROUGHPUT TECHNOLOGIES AS DESCRIBED BY CMS-2020-01-R: HCPCS | Performed by: FAMILY MEDICINE

## 2020-10-28 LAB
SARS-COV-2 RNA SPEC QL NAA+PROBE: NOT DETECTED
SPECIMEN SOURCE: NORMAL

## 2020-11-16 ENCOUNTER — HEALTH MAINTENANCE LETTER (OUTPATIENT)
Age: 35
End: 2020-11-16

## 2021-02-03 ENCOUNTER — VIRTUAL VISIT (OUTPATIENT)
Dept: RHEUMATOLOGY | Facility: CLINIC | Age: 36
End: 2021-02-03
Payer: COMMERCIAL

## 2021-02-03 DIAGNOSIS — Z79.899 HIGH RISK MEDICATION USE: ICD-10-CM

## 2021-02-03 DIAGNOSIS — L40.50 PSORIATIC ARTHRITIS (H): Primary | ICD-10-CM

## 2021-02-03 PROCEDURE — 99214 OFFICE O/P EST MOD 30 MIN: CPT | Mod: 95 | Performed by: INTERNAL MEDICINE

## 2021-02-03 NOTE — PROGRESS NOTES
Shira Forbes  is a 35 year old year old female who is being evaluated via a billable video visit.      How would you like to obtain your AVS? MyChart  If the video visit is dropped, the invitation should be resent by: Text to cell phone: [phone]  Will anyone else be joining your video visit? No    Rheumatology Video Visit      Shira Forbes MRN# [id number]   YOB: 1985 Age: 35 year old      Date of visit: 2/03/21   PCP: Clinic, [facility]    Chief Complaint   Patient presents with:  Arthritis: Psoriatic    Assessment and Plan     1.  Psoriatic Arthritis: Asymmetric arthritis involving her MCPs and PIP consistent with psoriatic arthritis; dx'd on 5/1/2019; prolonged morning stiffness; and pain and stiffness that improved with activity and worsened with inactivity.  She has dry skin on her hands that has an appearance more consistent with eczema than psoriasis initially.  Possible nail pitting on one fingernail initially.  RF, CCP, ANTONIO, dsDNA, PR3, and MPO negative; no cytopenias; normal renal function, ESR, and CRP.  SI joint x-rays suggest sacroiliitis and are consistent with inflammatory back pain.  Hand and chest x-rays were okay.  Humira was started in May 2019 with significant improvement of joints and skin; but benefit began wearing off with symptoms worsening 7 days after dosing - worsening peripheral and axial symptoms; also with injection site reactions that have been worsening with each injection.  Therefore, stop Humira and Rx'd Enbrel but this was denied; her insurance required that she must fail ONE of cimzia, simponi, stelara, and both cosentyx and xeljanz. Therefore Simponi was used but she had worse skin symptoms and worse arthritis symptoms; so changed to Cosentyx but her insurance required Taltz first.  Taltz caused severe site reaction so it was not repeated.  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  Worsening arthritis  symptoms when off of treatment, so Cosentyx was started and later the dose was increased.  Doing fairly well now but still requiring naproxen 3-4 times per week.  Note that her insurance is asking that Enbrel be used and is currently denying Cosentyx; this is currently under appeal.  Considering that she is doing so much better on Cosentyx it seems unreasonable to require Enbrel at this point but if absolutely required with no other choice and her insurance understands that they would be stopping her therapy that is working then we may not have no choice and she verbalized understanding of this.  We discussed Enbrel in case a change is mandatory.  Chronic illness, progressive  - Continue Cosentyx 300mg SQ every 4 weeks    (premedicate with tylenol and benadryl)  - Continue naproxen 220-440 mg twice daily as needed for psoriatic arthritis  - Labs within a couple weeks: CBC, Creatinine, Hepatic Panel, ESR, CRP (to be faxed to [facility] in [place], [place])  - QuantiFERON-TB gold plus was reportedly done recently in employee health and my fax number was given to her so that she can request that the results be sent to me    # At this time the vaccine from Pfizer and Moderna for COVID19 is recommended based on our knowledge of this vaccine and vaccines in the past.  However, there is no data currently available to establish safety and efficacy for this vaccine in immunocompromised people.    # This is a virtual visit to reduce the risk of COVID-19 exposure during this current pandemic.      # Considered to be at high risk of complications from the COVID-19 virus.  It is recommended to limit contact with other people and if possible to work remotely or provide a leave of absence to reduce the risk for COVID-19.      Total minutes spent in evaluation with patient, documentation, and review of pertinent lab tests, imaging studies, and chart notes: 16    Ms. Forbes verbalized agreement with and understanding of the rational for  "the diagnosis and treatment plan.  All questions were answered to best of my ability and the patient's satisfaction. Ms. Forbes was advised to contact the clinic with any questions that may arise after the clinic visit.      Thank you for involving me in the care of the patient    Return to clinic: 6 mo    HPI   Shira Forbes is a 35 year old female with a past medical history significant for contact dermatitis, vitamin D deficiency, and axiety who is seen for follow-up of psoriatic arthritis    [facility]  \"I just want to send you a quick update and a thank you! I have been on Humira for 1 month now and feel amazing! I didn't actually realize how miserable I was.  I am sleeping better at night, not exhausted and can move easier.  Also my skin on my hands is totally clear and has been since after my first dose (this has been huge for me!!!!) Thank you so much! Will see you in September.  Truly life changing! Thank you! Jason\"    9/2019: Ms. Forbes reported that fairly quickly with Humira her arthritis improved.  Morning stiffness for less than 5-10 minutes.  Positive gelling phenomenon that was reduced in severity.  Everything got better including her hands where she no longer had cracking of her skin on the hands.  However, after subsequent dosing from Humira, approximately after 1.5-2 months of using it, the duration of benefit to started to weekend.  Symptoms began returning approximately 7 days after taking the Humira dose.  She also started having injection site reactions that have been worsening with each injection.      1/15/2020: Not doing as well as when she was on Humira.  Some \"fogginess\" with Simponi.  Has been on Simponi for more than 3 months.  Feels like her toes are swollen.  Hands have not been doing well; she says when she was on Humira her hands completely cleared up with regard to her skin.  Joint pain worse in the morning and improves with time and activity.  Back and hips are doing " okay but other joints are not.  Would like to change medication.    Insurance required Taltz before cosentyx. Taltz resulted in site reaction.  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    4/29/2020: worse ankle/hand > lower back pain and stiffness and she'd like to restart tx.  AM is worse; activity helps. Prolonged morning stiffness.      8/5/2020: feels much better on Cosentyx. Still mild lower back, right plantar fasciitis symptoms, but overall much better. Rash seems to be associated with exposures at work; affecting the hands. Tolerating cosentyx. Back to work now.     Today, 2/3/2021: Taking Cosentyx 300 mg every 4 weeks and doing much better than she had been in the past but still achy from time to time and more achy in the few days prior to her next Cosentyx dose.  She says that her insurance is requiring that she changed to Enbrel.  Note that Enbrel was going to be tried in the past but was denied by her insurance.  She is now doing well on Cosentyx and she prefers not changing medication.    Denies fevers, chills, nausea, vomiting, constipation, diarrhea. No abdominal pain.  No blood in her stool.  No chest pain/pressure, palpitations, or shortness of breath.     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[employer]    [employer]   12 point review of system was completed and negative except as noted in the HPI     Active Problem List     Patient Active Problem List   Diagnosis     Contact dermatitis and other eczema, due to unspecified cause     Vitamin D deficiency     CARDIOVASCULAR SCREENING; LDL GOAL LESS THAN 160     Malrotation of intestine     S/P appy     H/O left inguinal hernia repair     Anxiety     Menorrhagia     Admission for sterilization     Psoriatic arthritis (H)     Sacroiliitis (H)     Past Medical History     Past Medical History:   Diagnosis Date     Chickenpox      PONV (postoperative nausea and  vomiting)      Past Surgical History     Past Surgical History:   Procedure Laterality Date     APPENDECTOMY       DILATION AND CURETTAGE, HYSTEROSCOPY, ABLATE ENDOMETRIUM, COMBINED N/A 5/20/2019    Procedure: HYSTEROSCOPY, WITH DILATION AND CURETTAGE  AND ENDOMETRIAL ABLATION, Laparoscopic Bilateral Tubal Ligation;  Surgeon: Mc Purvis MD;  Location: [facility]     LAPAROSCOPIC HERNIORRHAPHY INGUINAL  3/28/2012    Procedure:LAPAROSCOPIC HERNIORRHAPHY INGUINAL; Laparoscopic Left Inguinal Herniorrhaphy Possible Right Inguinal Hernia; Surgeon:EMMANUELLE CHAPPELL; Location:[facility]     LAPAROSCOPIC TUBAL LIGATION Bilateral 5/20/2019    Procedure: Laparoscopic Tubal Sterilization;  Surgeon: Mc Purvis MD;  Location: [facility]     SURGICAL HISTORY OF -   05/20/06    D&C for incomplete AB     Allergy     Allergies   Allergen Reactions     Keflex [Cephalexin Monohydrate] Rash     Morphine Hives     Shellfish Allergy      Sulfa Drugs Rash     Current Medication List     Current Outpatient Medications   Medication Sig     Cholecalciferol (D3 VITAMIN PO)      clobetasol (TEMOVATE) 0.05 % external ointment Apply sparingly to affected area twice daily as needed.  Do not apply to face.     DiphenhydrAMINE HCl (DIPHENDRYL PO) Take 25 mg by mouth nightly as needed      IBUPROFEN PO      naproxen (NAPROSYN) 375 MG tablet Take 1 tablet (375 mg) by mouth every 12 hours as needed for other (Inflammatory pain)     Omega-3 Fatty Acids (FISH OIL PO)      Secukinumab, 300 MG Dose, (COSENTYX SENSOREADY, 300 MG,) 150 MG/ML SOAJ Inject 300 mg Subcutaneous every 28 days     triamcinolone (KENALOG) 0.1 % external ointment Apply sparingly to affected area three times daily for 14 days.     azithromycin (ZITHROMAX) 250 MG tablet Two tablets first day, then one tablet daily for four days. (Patient not taking: Reported on 4/29/2020)     escitalopram (LEXAPRO) 5 MG tablet Take 1 tablet (5 mg) by mouth daily     No current  facility-administered medications for this visit.          Social History   See HPI    Family History     Family History   Problem Relation Age of Onset     Psychotic Disorder Maternal Grandmother      Alcohol/Drug Maternal Grandmother         alcohol     Heart Disease Maternal Grandfather         MI     Hypertension Maternal Grandfather      Diabetes Maternal Grandfather      Arthritis Paternal Grandmother         RA     Lipids Paternal Grandmother      Parkinsonism Paternal Grandfather      Heart Disease Paternal Grandfather         pacemaker     Lymphoma Cousin      Breast Cancer Maternal Aunt      Paternal grandmother: RA  Paternal aunt: lupus  Paternal cousin: RA    Physical Exam     Temp Readings from Last 3 Encounters:   02/03/20 98.9  F (37.2  C) (Tympanic)   05/20/19 98.1  F (36.7  C) (Oral)   05/08/19 97.6  F (36.4  C)     BP Readings from Last 5 Encounters:   02/03/20 122/82   01/15/20 136/82   09/11/19 127/87   05/20/19 112/86   05/08/19 127/84     Pulse Readings from Last 1 Encounters:   02/03/20 86     Resp Readings from Last 1 Encounters:   02/03/20 20     Estimated body mass index is 26.76 kg/m  as calculated from the following:    Height as of 2/3/20: 1.524 m (5').    Weight as of 2/3/20: 62.1 kg (137 lb).    No vitals taken today because this is a virtual visit    GEN: NAD. Healthy appearing adult.   HEENT: MMM.  Anicteric, noninjected sclera. No obvious external lesions of the ear and nose. Hearing intact.  PULM: No increased work of breathing  SKIN: No rash or jaundice seen  PSYCH: Alert. Appropriate.        Labs / Imaging (select studies)     RF/CCP  Recent Labs   Lab Test 12/12/18  0920   CCPIGG 1   RHF <20     CBC  Recent Labs   Lab Test 08/07/20  0726 03/26/20  0829 01/13/20  0824   WBC 5.2 7.5 6.0   RBC 4.58 4.68 4.98   HGB 13.7 14.2 15.2   HCT 41.6 43.2 44.9   MCV 91 92 90   RDW 12.7 13.5 13.1    230 258   MCH 29.9 30.3 30.5   MCHC 32.9 32.9 33.9   NEUTROPHIL 59.2 54.1 52.8   LYMPH  25.6 29.2 33.7   MONOCYTE 8.8 8.1 8.1   EOSINOPHIL 6.0 8.1 5.1   BASOPHIL 0.4 0.5 0.3   ANEU 3.1 4.1 3.2   ALYM 1.3 2.2 2.0   LAMONT 0.5 0.6 0.5   AEOS 0.3 0.6 0.3   ABAS 0.0 0.0 0.0     CMP  Recent Labs   Lab Test 08/07/20  0726 03/26/20  0829 01/13/20  0824 12/12/18  0920 12/12/18 0920 05/22/17  1612 12/21/14  0847   NA  --   --   --   --  138 136 137   POTASSIUM  --   --   --   --  4.0 3.8 3.5   CHLORIDE  --   --   --   --  106 105 107   CO2  --   --   --   --  27 22 24   ANIONGAP  --   --   --   --  5 9 6   GLC  --   --   --   --  88 100* 90   BUN  --   --   --   --  11 12 9   CR 0.82 0.78 0.76   < > 0.70 0.67 0.63   GFRESTIMATED >90 >90 >90   < > >90 >90  Non  GFR Calc   >90  Non  GFR Calc     GFRESTBLACK >90 >90 >90   < > >90 >90   GFR Calc   >90   GFR Calc     BERNARDO  --   --   --   --  8.6 8.7 8.7   BILITOTAL 0.4 0.4 0.6   < > 0.3 0.2 0.3   ALBUMIN 3.8 3.9 3.9   < > 4.1 4.1 3.8   PROTTOTAL 7.2 7.6 7.5   < > 7.3 7.6 7.5   ALKPHOS 52 45 40   < > 44 47 46   AST 13 13 13   < > 10 14 7   ALT 19 25 21   < > 21 16 18    < > = values in this interval not displayed.     Calcium/VitaminD  Recent Labs   Lab Test 12/12/18 0920 05/22/17  1612 12/21/14  0847   BERNARDO 8.6 8.7 8.7     ESR/CRP  Recent Labs   Lab Test 08/07/20  0726 03/26/20  0829 01/13/20  0824   SED 4 5 5   CRP <2.9 <2.9 <2.9     Hepatitis B  Recent Labs   Lab Test 05/02/19  1400 01/06/15  1051   HBCAB Nonreactive  --    HEPBANG  --  Nonreactive     Hepatitis C  Recent Labs   Lab Test 05/02/19  1400   HCVAB Nonreactive     Lyme ab screening  Recent Labs   Lab Test 12/12/18  0920   LYMEGM 0.04     Tuberculosis Screening  Recent Labs   Lab Test 08/07/20  0726 05/02/19  1400   TBRES  --  Negative   TBRST Negative  --      HIV Screening  Recent Labs   Lab Test 01/06/15  1051   HIAGAB Nonreactive   HIV-1 p24 Ag & HIV-1/HIV-2 Ab Not Detected         Immunization History     Immunization History    Administered Date(s) Administered     Influenza (IIV3) PF 10/15/2008, 10/19/2012     Influenza Vaccine IM > 6 months Valent IIV4 09/29/2015, 09/26/2016, 10/08/2019, 10/12/2020     Pneumo Conj 13-V (2010&after) 05/08/2019     Pneumococcal 23 valent 09/11/2019     TD (ADULT, 7+) 02/01/2004     TDAP Vaccine (Adacel) 05/26/2015     Zoster vaccine recombinant adjuvanted (SHINGRIX) 10/14/2019, 09/29/2020          Chart documentation done in part with Dragon Voice recognition Software. Although reviewed after completion, some word and grammatical error may remain.        Video-Visit Details    Type of service:  Video Visit    Video Start Time: 3:47 PM    Video End Time: 4:00 PM    Originating Location (pt. Location): [place], [place]    Distant Location (provider location):  Home    Platform used for Video Visit: Sukh Cabrera MD

## 2021-02-03 NOTE — PROGRESS NOTES
Labs faxed to [facility] in [place]  [phone]  Samaria Ye CMA Rheumatology  2/3/2021 4:23 PM

## 2021-02-03 NOTE — PROGRESS NOTES
Samaria: please fax the lab orders that were entered today to the [facility] in [place], [place]. This is at the patient's request.  Ronnie Cabrera MD  2/3/2021 4:08 PM

## 2021-02-03 NOTE — PATIENT INSTRUCTIONS
RHEUMATOLOGY    Dr. Ronnie Cabrera    [facility]  [address] [place] [address]    Our new phone number for Rheumatology is [phone], this number will be able to help you schedule appointments for Dr. Cabrera or if you have any message you would like sent to us.    Thank you for choosing [facility]!  Samaria Ye [facility] Rheumatology

## 2021-02-03 NOTE — Clinical Note
Samaria: please fax the lab orders that were entered today to the [facility] in [place], [place]. This is at the patient's request.  Ronnie Cabrera MD  2/3/2021 4:08 PM

## 2021-02-17 ENCOUNTER — TRANSFERRED RECORDS (OUTPATIENT)
Dept: HEALTH INFORMATION MANAGEMENT | Facility: CLINIC | Age: 36
End: 2021-02-17

## 2021-02-17 LAB
ALT SERPL-CCNC: 24 IU/L (ref 12–65)
AST SERPL-CCNC: 15 IU/L (ref 15–37)
CREAT SERPL-MCNC: 0.95 MG/DL (ref 0.6–1.3)
GFR SERPL CREATININE-BSD FRML MDRD: >60 ML/MIN/1.73M2

## 2021-08-18 ENCOUNTER — VIRTUAL VISIT (OUTPATIENT)
Dept: RHEUMATOLOGY | Facility: CLINIC | Age: 36
End: 2021-08-18
Payer: COMMERCIAL

## 2021-08-18 DIAGNOSIS — L40.50 PSORIATIC ARTHRITIS (H): Primary | ICD-10-CM

## 2021-08-18 DIAGNOSIS — Z79.899 HIGH RISK MEDICATION USE: ICD-10-CM

## 2021-08-18 PROCEDURE — 99214 OFFICE O/P EST MOD 30 MIN: CPT | Mod: 95 | Performed by: INTERNAL MEDICINE

## 2021-08-18 RX ORDER — SECUKINUMAB 150 MG/ML
300 INJECTION SUBCUTANEOUS
Qty: 2 ML | Refills: 6 | Status: SHIPPED | OUTPATIENT
Start: 2021-08-18 | End: 2022-02-23

## 2021-08-18 NOTE — PATIENT INSTRUCTIONS
RHEUMATOLOGY    Dr. Ronnie Cabrera    [address] [place] [address]  Phone number: [phone]  Fax number: [phone]      Thank you for choosing [facility]!    Samaria Ye CMA Rheumatology

## 2021-08-18 NOTE — PROGRESS NOTES
Shira Forbes  is a 36 year old year old female who is being evaluated via a billable video visit.      How would you like to obtain your AVS? MyChart  If the video visit is dropped, the invitation should be resent by: Text to cell phone: [phone]  Will anyone else be joining your video visit? No    Rheumatology Video Visit      Shira Forbes MRN# [id number]   YOB: 1985 Age: 36 year old      Date of visit: 8/18/21   PCP: Clinic, [facility]    Chief Complaint   Patient presents with:  Arthritis: Psoriatic    Assessment and Plan     1.  Psoriatic Arthritis: Asymmetric arthritis involving her MCPs and PIP consistent with psoriatic arthritis; dx'd on 5/1/2019; prolonged morning stiffness; and pain and stiffness that improved with activity and worsened with inactivity.  She has dry skin on her hands that has an appearance more consistent with eczema than psoriasis initially.  Possible nail pitting on one fingernail initially.  RF, CCP, ANTONIO, dsDNA, PR3, and MPO negative; no cytopenias; normal renal function, ESR, and CRP.  SI joint x-rays suggest sacroiliitis and she has inflammatory back pain.  Hand and chest x-rays were okay.  Humira was started in May 2019 with significant improvement of joints and skin; but benefit began wearing off with symptoms worsening 7 days after dosing - worsening peripheral and axial symptoms; also with injection site reactions that have been worsening with each injection.  Therefore, stop Humira and Rx'd Enbrel but this was denied; her insurance required that she must fail ONE of cimzia, simponi, stelara, and both cosentyx and xeljanz. Therefore Simponi was used but she had worse skin symptoms and worse arthritis symptoms; so changed to Cosentyx but her insurance required Taltz first.  Taltz caused severe site reaction so it was not repeated.  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  Worsening arthritis symptoms when off  "of treatment, so Cosentyx was started and later the dose was increased.  Doing well at this time and requiring naproxen 220 mg 1-2x/week.  Chronic illness, stable.    - Continue Cosentyx 300mg SQ every 4 weeks      - Continue naproxen 220-440 mg twice daily as needed for psoriatic arthritis (use sparingly)  - Labs in 6 months: CBC, Creatinine, Hepatic Panel, ESR, CRP, QuantiFERON-TB gold plus (to be faxed to [facility] in [place], [place])    - COVID-19: has received the Pfizer COVID-19 vaccine on 12/23/2020 and 1/13/2021 ; advised receiving one booster dose with either the Pfizer or Moderna COVID-19 vaccine, preferably receiving a third dose of the same vaccine used previously if the previous vaccine was from [employer] or [employer]    Based on our current understanding (and this may change over time as we learn more), the following adjustments are recommended around the time of COVID-19 vaccination:  - NSAIDs and Acetaminophen: hold for 24 hours prior to vaccination if able to do so     Total minutes spent in evaluation with patient, documentation, , and review of pertinent studies and chart notes: 13     Ms. Forbes verbalized agreement with and understanding of the rational for the diagnosis and treatment plan.  All questions were answered to best of my ability and the patient's satisfaction. Ms. Forbes was advised to contact the clinic with any questions that may arise after the clinic visit.      Thank you for involving me in the care of the patient    Return to clinic: 6 mo    HPI   Shira Forbes is a 36 year old female with a past medical history significant for contact dermatitis, vitamin D deficiency, and axiety who is seen for follow-up of psoriatic arthritis    MyChart message  \"I just want to send you a quick update and a thank you! I have been on Humira for 1 month now and feel amazing! I didn't actually realize how miserable I was.  I am sleeping better at night, not exhausted and can move easier. " " Also my skin on my hands is totally clear and has been since after my first dose (this has been huge for me!!!!) Thank you so much! Will see you in September.  Truly life changing! Thank you! Jason\"    9/2019: Ms. Forbes reported that fairly quickly with Humira her arthritis improved.  Morning stiffness for less than 5-10 minutes.  Positive gelling phenomenon that was reduced in severity.  Everything got better including her hands where she no longer had cracking of her skin on the hands.  However, after subsequent dosing from Humira, approximately after 1.5-2 months of using it, the duration of benefit to started to weekend.  Symptoms began returning approximately 7 days after taking the Humira dose.  She also started having injection site reactions that have been worsening with each injection.      1/15/2020: Not doing as well as when she was on Humira.  Some \"fogginess\" with Simponi.  Has been on Simponi for more than 3 months.  Feels like her toes are swollen.  Hands have not been doing well; she says when she was on Humira her hands completely cleared up with regard to her skin.  Joint pain worse in the morning and improves with time and activity.  Back and hips are doing okay but other joints are not.  Would like to change medication.    Insurance required Taltz before cosentyx. Taltz resulted in site reaction.  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    4/29/2020: worse ankle/hand > lower back pain and stiffness and she'd like to restart tx.  AM is worse; activity helps. Prolonged morning stiffness.      8/5/2020: feels much better on Cosentyx. Still mild lower back, right plantar fasciitis symptoms, but overall much better. Rash seems to be associated with exposures at work; affecting the hands. Tolerating cosentyx. Back to work now.     2/3/2021: Taking Cosentyx 300 mg every 4 weeks and doing much better than she had been in the past but still achy from time to " time and more achy in the few days prior to her next Cosentyx dose.  She says that her insurance is requiring that she changed to Enbrel.  Note that Enbrel was going to be tried in the past but was denied by her insurance.  She is now doing well on Cosentyx and she prefers not changing medication.    Today, 8/18/2021: Doing well on Cosentyx 300 mg SQ every 4 weeks.  Not requiring premedication before Cosentyx dosing and not having any injection site reactions.  Occasional ankle ache that is mild and short-lived.  No other joint pain.  No joint swelling or morning stiffness.  Uses naproxen 220 mg, approximately 1-2 times per week    Denies fevers, chills, nausea, vomiting, constipation, diarrhea. No abdominal pain.  No blood in her stool.  No chest pain/pressure, palpitations, or shortness of breath.     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[employer]    [employer]   12 point review of system was completed and negative except as noted in the HPI     Active Problem List     Patient Active Problem List   Diagnosis     Contact dermatitis and other eczema, due to unspecified cause     Vitamin D deficiency     CARDIOVASCULAR SCREENING; LDL GOAL LESS THAN 160     Malrotation of intestine     S/P appy     H/O left inguinal hernia repair     Anxiety     Menorrhagia     Admission for sterilization     Psoriatic arthritis (H)     Sacroiliitis (H)     Past Medical History     Past Medical History:   Diagnosis Date     Chickenpox      PONV (postoperative nausea and vomiting)      Past Surgical History     Past Surgical History:   Procedure Laterality Date     APPENDECTOMY       DILATION AND CURETTAGE, HYSTEROSCOPY, ABLATE ENDOMETRIUM, COMBINED N/A 5/20/2019    Procedure: HYSTEROSCOPY, WITH DILATION AND CURETTAGE  AND ENDOMETRIAL ABLATION, Laparoscopic Bilateral Tubal Ligation;  Surgeon: Mc Purvis MD;  Location: [facility]     LAPAROSCOPIC HERNIORRHAPHY INGUINAL   3/28/2012    Procedure:LAPAROSCOPIC HERNIORRHAPHY INGUINAL; Laparoscopic Left Inguinal Herniorrhaphy Possible Right Inguinal Hernia; Surgeon:EMMANUELLE CHAPPELL; Location:[facility]     LAPAROSCOPIC TUBAL LIGATION Bilateral 5/20/2019    Procedure: Laparoscopic Tubal Sterilization;  Surgeon: Mc Purvis MD;  Location: [facility]     SURGICAL HISTORY OF -   05/20/06    D&C for incomplete AB     Allergy     Allergies   Allergen Reactions     Keflex [Cephalexin Monohydrate] Rash     Morphine Hives     Shellfish Allergy      Sulfa Drugs Rash     Current Medication List     Current Outpatient Medications   Medication Sig     Cholecalciferol (D3 VITAMIN PO)      clobetasol (TEMOVATE) 0.05 % external ointment Apply sparingly to affected area twice daily as needed.  Do not apply to face.     IBUPROFEN PO      naproxen (NAPROSYN) 375 MG tablet Take 1 tablet (375 mg) by mouth every 12 hours as needed for other (Inflammatory pain)     Omega-3 Fatty Acids (FISH OIL PO)      Secukinumab, 300 MG Dose, (COSENTYX SENSOREADY, 300 MG,) 150 MG/ML SOAJ Inject 300 mg Subcutaneous every 28 days     triamcinolone (KENALOG) 0.1 % external ointment Apply sparingly to affected area three times daily for 14 days.     azithromycin (ZITHROMAX) 250 MG tablet Two tablets first day, then one tablet daily for four days. (Patient not taking: Reported on 4/29/2020)     DiphenhydrAMINE HCl (DIPHENDRYL PO) Take 25 mg by mouth nightly as needed      escitalopram (LEXAPRO) 5 MG tablet Take 1 tablet (5 mg) by mouth daily     No current facility-administered medications for this visit.         Social History   See HPI    Family History     Family History   Problem Relation Age of Onset     Psychotic Disorder Maternal Grandmother      Alcohol/Drug Maternal Grandmother         alcohol     Heart Disease Maternal Grandfather         MI     Hypertension Maternal Grandfather      Diabetes Maternal Grandfather      Arthritis Paternal Grandmother         RA      Lipids Paternal Grandmother      Parkinsonism Paternal Grandfather      Heart Disease Paternal Grandfather         pacemaker     Lymphoma Cousin      Breast Cancer Maternal Aunt      Paternal grandmother: RA  Paternal aunt: lupus  Paternal cousin: RA    Physical Exam     Temp Readings from Last 3 Encounters:   02/03/20 98.9  F (37.2  C) (Tympanic)   05/20/19 98.1  F (36.7  C) (Oral)   05/08/19 97.6  F (36.4  C)     BP Readings from Last 5 Encounters:   02/03/20 122/82   01/15/20 136/82   09/11/19 127/87   05/20/19 112/86   05/08/19 127/84     Pulse Readings from Last 1 Encounters:   02/03/20 86     Resp Readings from Last 1 Encounters:   02/03/20 20     Estimated body mass index is 26.76 kg/m  as calculated from the following:    Height as of 2/3/20: 1.524 m (5').    Weight as of 2/3/20: 62.1 kg (137 lb).    No vitals taken today because this is a virtual visit    GEN: NAD. Healthy appearing adult.   HEENT: MMM.  Anicteric, noninjected sclera. No obvious external lesions of the ear and nose. Hearing intact.  PULM: No increased work of breathing  SKIN: No rash or jaundice seen  PSYCH: Alert. Appropriate.        Labs / Imaging (select studies)     RF/CCP  Recent Labs   Lab Test 12/12/18  0920   CCPIGG 1   RHF <20     CBC  Recent Labs   Lab Test 08/07/20  0726 03/26/20  0829 01/13/20  0824   WBC 5.2 7.5 6.0   RBC 4.58 4.68 4.98   HGB 13.7 14.2 15.2   HCT 41.6 43.2 44.9   MCV 91 92 90   RDW 12.7 13.5 13.1    230 258   MCH 29.9 30.3 30.5   MCHC 32.9 32.9 33.9   NEUTROPHIL 59.2 54.1 52.8   LYMPH 25.6 29.2 33.7   MONOCYTE 8.8 8.1 8.1   EOSINOPHIL 6.0 8.1 5.1   BASOPHIL 0.4 0.5 0.3   ANEU 3.1 4.1 3.2   ALYM 1.3 2.2 2.0   LAMONT 0.5 0.6 0.5   AEOS 0.3 0.6 0.3   ABAS 0.0 0.0 0.0     CMP  Recent Labs   Lab Test 02/17/21  0000 08/07/20  0726 03/26/20  0829 01/13/20  0824 12/12/18  0920 05/22/17  1612 12/21/14  0847   NA  --   --   --   --  138 136 137   POTASSIUM  --   --   --   --  4.0 3.8 3.5   CHLORIDE  --   --   --    --  106 105 107   CO2  --   --   --   --  27 22 24   ANIONGAP  --   --   --   --  5 9 6   GLC  --   --   --   --  88 100* 90   BUN  --   --   --   --  11 12 9   CR 0.95 0.82 0.78 0.76 0.70 0.67 0.63   GFRESTIMATED >60 >90 >90 >90 >90 >90  Non  GFR Calc   >90  Non  GFR Calc     GFRESTBLACK >60 >90 >90 >90 >90 >90   GFR Calc   >90   GFR Calc     BERNARDO  --   --   --   --  8.6 8.7 8.7   BILITOTAL  --  0.4 0.4 0.6 0.3 0.2 0.3   ALBUMIN  --  3.8 3.9 3.9 4.1 4.1 3.8   PROTTOTAL  --  7.2 7.6 7.5 7.3 7.6 7.5   ALKPHOS  --  52 45 40 44 47 46   AST 15 13 13 13 10 14 7   ALT 24 19 25 21 21 16 18     Calcium/VitaminD  Recent Labs   Lab Test 12/12/18  0920 05/22/17  1612 12/21/14  0847   BERNARDO 8.6 8.7 8.7     ESR/CRP  Recent Labs   Lab Test 08/07/20  0726 03/26/20  0829 01/13/20  0824   SED 4 5 5   CRP <2.9 <2.9 <2.9     Hepatitis B  Recent Labs   Lab Test 05/02/19  1400 01/06/15  1051   HBCAB Nonreactive  --    HEPBANG  --  Nonreactive     Hepatitis C  Recent Labs   Lab Test 05/02/19  1400   HCVAB Nonreactive     Lyme ab screening  Recent Labs   Lab Test 12/12/18  0920   LYMEGM 0.04     Tuberculosis Screening  Recent Labs   Lab Test 08/07/20  0726 05/02/19  1400   TBRES  --  Negative   TBRST Negative  --      HIV Screening  Recent Labs   Lab Test 01/06/15  1051   HIAGAB Nonreactive   HIV-1 p24 Ag & HIV-1/HIV-2 Ab Not Detected       Immunization History     Immunization History   Administered Date(s) Administered     COVID-19,PF,Pfizer 12/23/2020, 01/13/2021     Influenza (IIV3) PF 10/15/2008, 10/19/2012     Influenza Vaccine IM > 6 months Valent IIV4 09/29/2015, 09/26/2016, 10/08/2019, 10/12/2020     Pneumo Conj 13-V (2010&after) 05/08/2019     Pneumococcal 23 valent 09/11/2019     TD (ADULT, 7+) 02/01/2004     TDAP Vaccine (Adacel) 05/26/2015     Zoster vaccine recombinant adjuvanted (SHINGRIX) 10/14/2019, 09/29/2020          Chart documentation done in part with  [employer] Voice recognition Software. Although reviewed after completion, some word and grammatical error may remain.      Video-Visit Details    Type of service:  Video Visit    Video Start Time: 9:28 AM    Video End Time: 9:35 AM    Originating Location (pt. Location):  [facility] vehicle, [place]    Distant Location (provider location):  [facility] in [place], [place]    Platform used for Video Visit: Sukh Cabrera MD

## 2021-09-18 ENCOUNTER — HEALTH MAINTENANCE LETTER (OUTPATIENT)
Age: 36
End: 2021-09-18

## 2022-01-08 ENCOUNTER — HEALTH MAINTENANCE LETTER (OUTPATIENT)
Age: 37
End: 2022-01-08

## 2022-01-26 ENCOUNTER — MYC MEDICAL ADVICE (OUTPATIENT)
Dept: RHEUMATOLOGY | Facility: CLINIC | Age: 37
End: 2022-01-26
Payer: COMMERCIAL

## 2022-01-26 NOTE — TELEPHONE ENCOUNTER
"Rheumatology Telephone Note:    I called and spoke with Ms. Forbes.  She is considering monoclonal antibody versus paxlovid or monitoring.  Other than \"winter cough\" that is not unusual for her she has no other infection symptoms.  She is not sure if she is going to seek treatment at this time or monitor.  We discussed options.    Because of positive COVID-19 PCR test, I recommended that she delay the fourth mRNA COVID-19 vaccination by 3 months.  If she receives a monoclonal antibody then she needs to receive the COVID-19 vaccination no sooner than 3 months from the time of treatment.  Because she has received 3 doses of the Pfizer COVID-19 vaccine, the fourth mRNA COVID-19 vaccination should be either Pfizer or Moderna; we discussed the benefits of receiving Moderna over Pfizer.     Hold Cosentyx for 2 weeks.  If no infection symptoms then she may restart Cosentyx at that time.  If she develops COVID-19 infection then she may restart Cosentyx 2 weeks after the last COVID-19 symptom resolves.    All questions were answered and she thanked me for the call.     Ronnie Cabrera MD  1/26/2022 4:12 PM      "

## 2022-02-21 ENCOUNTER — TRANSFERRED RECORDS (OUTPATIENT)
Dept: HEALTH INFORMATION MANAGEMENT | Facility: CLINIC | Age: 37
End: 2022-02-21
Payer: COMMERCIAL

## 2022-02-21 LAB
ALT SERPL-CCNC: 21 IU/L (ref 12–65)
AST SERPL-CCNC: 12 IU/L (ref 15–37)
CREATININE (EXTERNAL): 0.94 MG/DL (ref 0.6–1.3)
GFR ESTIMATED (EXTERNAL): >60 ML/MIN/1.73M2
GFR ESTIMATED (IF AFRICAN AMERICAN) (EXTERNAL): >60 ML/MIN/1.73M2

## 2022-02-23 ENCOUNTER — VIRTUAL VISIT (OUTPATIENT)
Dept: RHEUMATOLOGY | Facility: CLINIC | Age: 37
End: 2022-02-23
Payer: COMMERCIAL

## 2022-02-23 DIAGNOSIS — L30.9 DERMATITIS: ICD-10-CM

## 2022-02-23 DIAGNOSIS — R53.83 FATIGUE, UNSPECIFIED TYPE: ICD-10-CM

## 2022-02-23 DIAGNOSIS — L40.50 PSORIATIC ARTHRITIS (H): Primary | ICD-10-CM

## 2022-02-23 PROCEDURE — 99214 OFFICE O/P EST MOD 30 MIN: CPT | Mod: 95 | Performed by: INTERNAL MEDICINE

## 2022-02-23 RX ORDER — SECUKINUMAB 150 MG/ML
300 INJECTION SUBCUTANEOUS
Qty: 2 ML | Refills: 6 | Status: SHIPPED | OUTPATIENT
Start: 2022-02-23 | End: 2022-08-24

## 2022-02-23 NOTE — PROGRESS NOTES
Shira Forbes  is a 36 year old year old female who is being evaluated via a billable video visit.      How would you like to obtain your AVS? MyChart  If the video visit is dropped, the invitation should be resent by: Text to cell phone: [phone]  Will anyone else be joining your video visit? No     Rheumatology Video Visit      Shira Forbes MRN# [id number]   YOB: 1985 Age: 36 year old      Date of visit: 2/23/22   PCP: Clinic, [facility] [place]    Chief Complaint   Patient presents with:  Psoriatic arthritis    Assessment and Plan     1.  Psoriatic Arthritis: Asymmetric arthritis involving her MCPs and PIP consistent with psoriatic arthritis; dx'd on 5/1/2019; prolonged morning stiffness; and pain and stiffness that improved with activity and worsened with inactivity.  She has dry skin on her hands that has an appearance more consistent with eczema than psoriasis initially.  Possible nail pitting on one fingernail initially.  RF, CCP, ANTONIO, dsDNA, PR3, and MPO negative; no cytopenias; normal renal function, ESR, and CRP.  SI joint x-rays suggest sacroiliitis and she has inflammatory back pain.  Hand and chest x-rays were okay.  Humira was started in May 2019 with significant improvement of joints and skin; but benefit began wearing off with symptoms worsening 7 days after dosing - worsening peripheral and axial symptoms; also with injection site reactions that have been worsening with each injection.  Therefore, stop Humira and Rx'd Enbrel but this was denied; her insurance required that she must fail ONE of cimzia, simponi, stelara, and both cosentyx and xeljanz. Therefore Simponi was used but she had worse skin symptoms and worse arthritis symptoms; so changed to Cosentyx but her insurance required Taltz first.  Taltz caused severe site reaction so it was not repeated.  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  Worsening arthritis symptoms when off  of treatment, so Cosentyx was started and later the dose was increased.  Doing well at this time; using naproxen 220 mg 1-2x/week.  Chronic illness, stable.    - Continue Cosentyx 300mg SQ every 4 weeks      - Continue naproxen 220-440 mg twice daily as needed for psoriatic arthritis (use sparingly)  - No rheumatology labs needed prior to 6-month rheumatology follow-up    2.  Bilateral hand dermatitis: Dermatitis versus psoriasis.  Improves with increased sun exposure.  Does not improve with moisturizers throughout the day.  Advised that she see dermatology, where topical steroids and possibly light therapy could be considered.  She says that she will consider making a dermatology appointment.  Chronic illness, progressive.      3.  Fatigue: Denies snoring.  Denies no apneic episodes.  Wakes up tired.  Tired throughout the day.  Not exercising as frequently as she has in the past.  Encouraged regular exercise.  Question if this is sleep related and advised that she speak with a sleep psychologist.  Psoriatic arthritis appears well controlled so is less likely related.    - COVID-19: has received the Pfizer COVID-19 vaccine on 12/23/2020 and 1/13/2021 and 8/23/2021.  Received monoclonal antibody treatment for COVID-19 infection on 1/28/2022.  A 4th dose of the mRNA COVID-19 vaccination is recommended to be received 5 months after the third mRNA COVID-19 vaccination was received, and at least 90 days after monoclonal antibody treatment for COVID-19 infection.      Total minutes spent in evaluation with patient, documentation, , and review of pertinent studies and chart notes: 16     Ms. Forbes verbalized agreement with and understanding of the rational for the diagnosis and treatment plan.  All questions were answered to best of my ability and the patient's satisfaction. Ms. Forbes was advised to contact the clinic with any questions that may arise after the clinic visit.      Thank you for involving me  "in the care of the patient    Return to clinic: 6 mo    HPI   Shira Forbes is a 36 year old female with a past medical history significant for contact dermatitis, vitamin D deficiency, and axiety who is seen for follow-up of psoriatic arthritis    [facility]  \"I just want to send you a quick update and a thank you! I have been on Humira for 1 month now and feel amazing! I didn't actually realize how miserable I was.  I am sleeping better at night, not exhausted and can move easier.  Also my skin on my hands is totally clear and has been since after my first dose (this has been huge for me!!!!) Thank you so much! Will see you in September.  Truly life changing! Thank you! Jason\"    9/2019: Ms. Forbes reported that fairly quickly with Humira her arthritis improved.  Morning stiffness for less than 5-10 minutes.  Positive gelling phenomenon that was reduced in severity.  Everything got better including her hands where she no longer had cracking of her skin on the hands.  However, after subsequent dosing from Humira, approximately after 1.5-2 months of using it, the duration of benefit to started to weekend.  Symptoms began returning approximately 7 days after taking the Humira dose.  She also started having injection site reactions that have been worsening with each injection.      1/15/2020: Not doing as well as when she was on Humira.  Some \"fogginess\" with Simponi.  Has been on Simponi for more than 3 months.  Feels like her toes are swollen.  Hands have not been doing well; she says when she was on Humira her hands completely cleared up with regard to her skin.  Joint pain worse in the morning and improves with time and activity.  Back and hips are doing okay but other joints are not.  Would like to change medication.    Insurance required Taltz before cosentyx. Taltz resulted in site reaction.  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    4/29/2020: worse " ankle/hand > lower back pain and stiffness and she'd like to restart tx.  AM is worse; activity helps. Prolonged morning stiffness.      8/5/2020: feels much better on Cosentyx. Still mild lower back, right plantar fasciitis symptoms, but overall much better. Rash seems to be associated with exposures at work; affecting the hands. Tolerating cosentyx. Back to work now.     2/3/2021: Taking Cosentyx 300 mg every 4 weeks and doing much better than she had been in the past but still achy from time to time and more achy in the few days prior to her next Cosentyx dose.  She says that her insurance is requiring that she changed to Enbrel.  Note that Enbrel was going to be tried in the past but was denied by her insurance.  She is now doing well on Cosentyx and she prefers not changing medication.    8/18/2021: Doing well on Cosentyx 300 mg SQ every 4 weeks.  Not requiring premedication before Cosentyx dosing and not having any injection site reactions.  Occasional ankle ache that is mild and short-lived.  No other joint pain.  No joint swelling or morning stiffness.  Uses naproxen 220 mg, approximately 1-2 times per week    Today, 2/23/2022: Arthritis is doing well at this time.  No joint pain or swelling or morning stiffness.  Uses naproxen 1-2 times per week.  Tolerating Cosentyx well.  When off Cosentyx because of COVID-19 infection she had worsening dermatitis on her hands.  She notes that when she goes to warmer climate during the winter then the dermatitis on her hands significantly improves.  Lotions throughout the day provide minimal benefit.  Fatigue; waking up tired; finding it difficult to exercise because of fatigue; could sleep during the day we given the opportunity to.  Denies snoring.  Denies known apneic episodes.    Denies fevers, chills, nausea, vomiting, constipation, diarrhea. No abdominal pain.  No blood in her stool.  No chest pain/pressure, palpitations, or shortness of breath.     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[employer]    [employer]   12 point review of system was completed and negative except as noted in the HPI     Active Problem List     Patient Active Problem List   Diagnosis     Contact dermatitis and other eczema, due to unspecified cause     Vitamin D deficiency     CARDIOVASCULAR SCREENING; LDL GOAL LESS THAN 160     Malrotation of intestine     S/P appy     H/O left inguinal hernia repair     Anxiety     Menorrhagia     Admission for sterilization     Psoriatic arthritis (H)     Sacroiliitis (H)     Past Medical History     Past Medical History:   Diagnosis Date     Chickenpox      PONV (postoperative nausea and vomiting)      Past Surgical History     Past Surgical History:   Procedure Laterality Date     APPENDECTOMY       DILATION AND CURETTAGE, HYSTEROSCOPY, ABLATE ENDOMETRIUM, COMBINED N/A 5/20/2019    Procedure: HYSTEROSCOPY, WITH DILATION AND CURETTAGE  AND ENDOMETRIAL ABLATION, Laparoscopic Bilateral Tubal Ligation;  Surgeon: Mc Purvis MD;  Location: [facility]     LAPAROSCOPIC HERNIORRHAPHY INGUINAL  3/28/2012    Procedure:LAPAROSCOPIC HERNIORRHAPHY INGUINAL; Laparoscopic Left Inguinal Herniorrhaphy Possible Right Inguinal Hernia; Surgeon:EMMANUELLE CHAPPELL; Location:[facility]     LAPAROSCOPIC TUBAL LIGATION Bilateral 5/20/2019    Procedure: Laparoscopic Tubal Sterilization;  Surgeon: Mc Purvis MD;  Location: [facility] OR     SURGICAL HISTORY OF -   05/20/06    D&C for incomplete AB     Allergy     Allergies   Allergen Reactions     Keflex [Cephalexin Monohydrate] Rash     Morphine Hives     Shellfish Allergy      Sulfa Drugs Rash     Current Medication List     Current Outpatient Medications   Medication Sig     Cholecalciferol (D3 VITAMIN PO)      clobetasol (TEMOVATE) 0.05 % external ointment Apply sparingly to affected area twice daily as needed.  Do not apply to face.     IBUPROFEN PO      naproxen  (NAPROSYN) 375 MG tablet Take 1 tablet (375 mg) by mouth every 12 hours as needed for other (Inflammatory pain)     Omega-3 Fatty Acids (FISH OIL PO)      Secukinumab, 300 MG Dose, (COSENTYX SENSOREADY, 300 MG,) 150 MG/ML SOAJ Inject 300 mg Subcutaneous every 28 days     triamcinolone (KENALOG) 0.1 % external ointment Apply sparingly to affected area three times daily for 14 days.     No current facility-administered medications for this visit.         Social History   See HPI    Family History     Family History   Problem Relation Age of Onset     Psychotic Disorder Maternal Grandmother      Alcohol/Drug Maternal Grandmother         alcohol     Heart Disease Maternal Grandfather         MI     Hypertension Maternal Grandfather      Diabetes Maternal Grandfather      Arthritis Paternal Grandmother         RA     Lipids Paternal Grandmother      Parkinsonism Paternal Grandfather      Heart Disease Paternal Grandfather         pacemaker     Lymphoma Cousin      Breast Cancer Maternal Aunt      Paternal grandmother: RA  Paternal aunt: lupus  Paternal cousin: RA    Physical Exam     Temp Readings from Last 3 Encounters:   02/03/20 98.9  F (37.2  C) (Tympanic)   05/20/19 98.1  F (36.7  C) (Oral)   05/08/19 97.6  F (36.4  C)     BP Readings from Last 5 Encounters:   02/03/20 122/82   01/15/20 136/82   09/11/19 127/87   05/20/19 112/86   05/08/19 127/84     Pulse Readings from Last 1 Encounters:   02/03/20 86     Resp Readings from Last 1 Encounters:   02/03/20 20     Estimated body mass index is 26.76 kg/m  as calculated from the following:    Height as of 2/3/20: 1.524 m (5').    Weight as of 2/3/20: 62.1 kg (137 lb).      GEN: NAD. Healthy appearing adult.   HEENT: MMM.  Anicteric, noninjected sclera. No obvious external lesions of the ear and nose. Hearing intact.  PULM: No increased work of breathing  SKIN: No rash or jaundice seen  PSYCH: Alert. Appropriate.        Labs / Imaging (select studies)       2/21/2022  [facility] labs (seen in [facility]) was normal for creatinine, LFTs, ESR, CRP, CBC, QuantiFERON-TB     -------------------------------------------------------  RF/CCP  Recent Labs   Lab Test 12/12/18  0920   CCPIGG 1   RHF <20     CBC  Recent Labs   Lab Test 08/07/20  0726 03/26/20  0829 01/13/20  0824   WBC 5.2 7.5 6.0   RBC 4.58 4.68 4.98   HGB 13.7 14.2 15.2   HCT 41.6 43.2 44.9   MCV 91 92 90   RDW 12.7 13.5 13.1    230 258   MCH 29.9 30.3 30.5   MCHC 32.9 32.9 33.9   NEUTROPHIL 59.2 54.1 52.8   LYMPH 25.6 29.2 33.7   MONOCYTE 8.8 8.1 8.1   EOSINOPHIL 6.0 8.1 5.1   BASOPHIL 0.4 0.5 0.3   ANEU 3.1 4.1 3.2   ALYM 1.3 2.2 2.0   ALMONT 0.5 0.6 0.5   AEOS 0.3 0.6 0.3   ABAS 0.0 0.0 0.0     CMP  Recent Labs   Lab Test 02/17/21  0000 08/07/20  0726 03/26/20  0829 01/13/20  0824 09/10/19  0820 12/12/18  0920 05/22/17  1612 12/21/14  0847   NA  --   --   --   --   --  138 136 137   POTASSIUM  --   --   --   --   --  4.0 3.8 3.5   CHLORIDE  --   --   --   --   --  106 105 107   CO2  --   --   --   --   --  27 22 24   ANIONGAP  --   --   --   --   --  5 9 6   GLC  --   --   --   --   --  88 100* 90   BUN  --   --   --   --   --  11 12 9   CR 0.95 0.82 0.78 0.76   < > 0.70 0.67 0.63   GFRESTIMATED >60 >90 >90 >90   < > >90 >90  Non  GFR Calc   >90  Non  GFR Calc     GFRESTBLACK >60 >90 >90 >90   < > >90 >90   GFR Calc   >90   GFR Calc     BERNARDO  --   --   --   --   --  8.6 8.7 8.7   BILITOTAL  --  0.4 0.4 0.6   < > 0.3 0.2 0.3   ALBUMIN  --  3.8 3.9 3.9   < > 4.1 4.1 3.8   PROTTOTAL  --  7.2 7.6 7.5   < > 7.3 7.6 7.5   ALKPHOS  --  52 45 40   < > 44 47 46   AST 15 13 13 13   < > 10 14 7   ALT 24 19 25 21   < > 21 16 18    < > = values in this interval not displayed.     Calcium/VitaminD  Recent Labs   Lab Test 12/12/18  0920 05/22/17  1612 12/21/14  0847   BERNARDO 8.6 8.7 8.7     ESR/CRP  Recent Labs   Lab Test 08/07/20  0726 03/26/20  0829 01/13/20  0824    SED 4 5 5   CRP <2.9 <2.9 <2.9     Hepatitis B  Recent Labs   Lab Test 05/02/19  1400 01/06/15  1051   HBCAB Nonreactive  --    HEPBANG  --  Nonreactive     Hepatitis C  Recent Labs   Lab Test 05/02/19  1400   HCVAB Nonreactive     Lyme ab screening  Recent Labs   Lab Test 12/12/18  0920   LYMEGM 0.04     Tuberculosis Screening  Recent Labs   Lab Test 08/07/20  0726 05/02/19  1400   TBRES  --  Negative   TBRST Negative  --      HIV Screening  Recent Labs   Lab Test 01/06/15  1051   HIAGAB Nonreactive   HIV-1 p24 Ag & HIV-1/HIV-2 Ab Not Detected           Immunization History     Immunization History   Administered Date(s) Administered     Influenza (IIV3) PF 10/15/2008, 10/19/2012     Influenza Vaccine IM > 6 months Valent IIV4 (Alfuria,Fluzone) 09/29/2015, 09/26/2016, 10/08/2019, 10/12/2020     Pneumo Conj 13-V (2010&after) 05/08/2019     Pneumococcal 23 valent 09/11/2019     TD (ADULT, 7+) 02/01/2004     TDAP Vaccine (Adacel) 05/26/2015     Zoster vaccine recombinant adjuvanted (SHINGRIX) 10/14/2019, 09/29/2020          Chart documentation done in part with Dragon Voice recognition Software. Although reviewed after completion, some word and grammatical error may remain.      Video-Visit Details    Type of service:  Video Visit    Video Start Time: 10:09 AM    Video End Time: 10:20 AM    Originating Location (pt. Location): [place], [place]    Distant Location (provider location):  [place]    Platform used for Video Visit: Sukh Cabrera MD

## 2022-02-23 NOTE — PATIENT INSTRUCTIONS
RHEUMATOLOGY    Dr. Ronnie Cabrera    [address] [place] [address]  Phone number: [phone]  Fax number: [phone]      Thank you for choosing [facility]!    Samaria Ye CMA Rheumatology

## 2022-08-22 ENCOUNTER — TELEPHONE (OUTPATIENT)
Dept: RHEUMATOLOGY | Facility: CLINIC | Age: 37
End: 2022-08-22

## 2022-08-22 NOTE — TELEPHONE ENCOUNTER
[facility] Call Center    Phone Message    May a detailed message be left on voicemail: yes     Reason for Call: Other: Annie from cover my meds is calling about the Prior authorization for Cosentyx expires on 9/13    Key: [id number].     Action Taken: Message routed to:  Other: [facility]    Travel Screening: Not Applicable

## 2022-08-24 ENCOUNTER — VIRTUAL VISIT (OUTPATIENT)
Dept: RHEUMATOLOGY | Facility: CLINIC | Age: 37
End: 2022-08-24
Payer: COMMERCIAL

## 2022-08-24 DIAGNOSIS — Z79.1 NSAID LONG-TERM USE: ICD-10-CM

## 2022-08-24 DIAGNOSIS — L40.50 PSORIATIC ARTHRITIS (H): Primary | ICD-10-CM

## 2022-08-24 PROCEDURE — 99214 OFFICE O/P EST MOD 30 MIN: CPT | Mod: 95 | Performed by: INTERNAL MEDICINE

## 2022-08-24 RX ORDER — SECUKINUMAB 150 MG/ML
300 INJECTION SUBCUTANEOUS
Qty: 2 ML | Refills: 7 | Status: SHIPPED | OUTPATIENT
Start: 2022-08-24 | End: 2023-03-01

## 2022-08-24 NOTE — PATIENT INSTRUCTIONS
RHEUMATOLOGY    Dr. Ronnie Cabrera    [address] [place] [address]  Phone number: [phone]  Fax number: [phone]      Thank you for choosing [facility]!    Samaria Ye CMA Rheumatology

## 2022-08-24 NOTE — PROGRESS NOTES
Shira Forbes  is a 37 year old year old who is being evaluated via a billable video visit.      How would you like to obtain your AVS? MyChart  If the video visit is dropped, the invitation should be resent by: Text to cell phone: [phone]   Will anyone else be joining your video visit? No     Shira Forbes  is a 36 year old year old female who is being evaluated via a billable video visit.      How would you like to obtain your AVS? MyChart  If the video visit is dropped, the invitation should be resent by: Text to cell phone: [phone]  Will anyone else be joining your video visit? No     Rheumatology Video Visit      Shira Forbes MRN# [id number]   YOB: 1985 Age: 37 year old      Date of visit: 8/24/22   PCP: Caleb, [facility]    Chief Complaint   Patient presents with:  Psoriatic arthritis: Doing so-so. Wakes her up in the middle of the night    Assessment and Plan     1.  Psoriatic Arthritis: Asymmetric arthritis involving her MCPs and PIP consistent with psoriatic arthritis; dx'd on 5/1/2019; prolonged morning stiffness; and pain and stiffness that improved with activity and worsened with inactivity.  She has dry skin on her hands that has an appearance more consistent with eczema than psoriasis initially.  Possible nail pitting on one fingernail initially.  RF, CCP, ANTONIO, dsDNA, PR3, and MPO negative; no cytopenias; normal renal function, ESR, and CRP.  SI joint x-rays suggest sacroiliitis and she has inflammatory back pain.  Hand and chest x-rays were okay.  Humira was started in May 2019 with significant improvement of joints and skin; but benefit began wearing off with symptoms worsening 7 days after dosing - worsening peripheral and axial symptoms; also with injection site reactions that have been worsening with each injection.  Therefore, stopped Humira and Rx'd Enbrel but this was denied; her insurance required that she must fail ONE of cimzia, simponi, stelara, and  both cosentyx and xeljanz. Therefore Simponi was used but she had worse skin symptoms and worse arthritis symptoms; so changed to Cosentyx but her insurance required Taltz first.  Taltz caused severe site reaction so it was not repeated.  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  Worsening arthritis symptoms when off of treatment, so Cosentyx was started and later the dose was increased.  Psoriatic arthritis fairly well controlled on Cosentyx, but does require naproxen 220-440 mg or ibuprofen 600 mg nightly on 3 nights per week for nighttime arthritis symptoms that is effective.  Tolerating NSAIDs well without any black or bloody stools or stomach upset.  Because of NSAID use will check labs.  Chronic illness, stable.    - Continue Cosentyx 300mg SQ every 4 weeks      - Continue naproxen 220-440 mg twice daily as needed for psoriatic arthritis (use sparingly)  - Labs now and in 6 months: CBC, creatinine, hepatic panel, ESR, CRP (lab orders to be faxed to [facility] in [place], [place] by JOY Mera)    2.  Bilateral hand dermatitis, history: Dermatitis versus psoriasis.  Improves with increased sun exposure.  Does not improve with moisturizers throughout the day.  Not an issue at this time with summer sun exposure and Cosentyx.  If worsening symptoms then should see dermatology.    3.  History of fatigue: Continue regular exercise.  Better during the summer months as she noticed could be seasonal affective disorder.    4.  Vaccinations: Vaccinations reviewed with Ms. Forbes.    - Influenza: encouraged yearly vaccination     - COVID-19: has received the Pfizer COVID-19 vaccine on 12/23/2020 and 1/13/2021 and 8/23/2021.  Received monoclonal antibody treatment for COVID-19 infection on 1/28/2022.  Moderna on 4/29/2022.   A 5th mRNA vaccine (2nd booster) may be received at least 4 months after the 4th dose.   Based on our current understanding (and this may change over time as we learn  "more), medications should be adjusted as noted below, if disease activity allows:  - NSAIDs and Acetaminophen: hold for 24 hours prior to vaccination if able to do so     Total minutes spent in evaluation with patient, documentation, , and review of pertinent studies and chart notes: 18     Ms. Forbes verbalized agreement with and understanding of the rational for the diagnosis and treatment plan.  All questions were answered to best of my ability and the patient's satisfaction. Ms. Forbes was advised to contact the clinic with any questions that may arise after the clinic visit.      Thank you for involving me in the care of the patient    Return to clinic: 6 mo    HPI   Shira Forbes is a 37 year old female with a past medical history significant for contact dermatitis, vitamin D deficiency, and axiety who is seen for follow-up of psoriatic arthritis    MyChart message  \"I just want to send you a quick update and a thank you! I have been on Humira for 1 month now and feel amazing! I didn't actually realize how miserable I was.  I am sleeping better at night, not exhausted and can move easier.  Also my skin on my hands is totally clear and has been since after my first dose (this has been huge for me!!!!) Thank you so much! Will see you in September.  Truly life changing! Thank you! Jason\"    9/2019: Ms. Forbes reported that fairly quickly with Humira her arthritis improved.  Morning stiffness for less than 5-10 minutes.  Positive gelling phenomenon that was reduced in severity.  Everything got better including her hands where she no longer had cracking of her skin on the hands.  However, after subsequent dosing from Humira, approximately after 1.5-2 months of using it, the duration of benefit to started to weekend.  Symptoms began returning approximately 7 days after taking the Humira dose.  She also started having injection site reactions that have been worsening with each injection.  " "    1/15/2020: Not doing as well as when she was on Humira.  Some \"fogginess\" with Simponi.  Has been on Simponi for more than 3 months.  Feels like her toes are swollen.  Hands have not been doing well; she says when she was on Humira her hands completely cleared up with regard to her skin.  Joint pain worse in the morning and improves with time and activity.  Back and hips are doing okay but other joints are not.  Would like to change medication.    Insurance required Taltz before cosentyx. Taltz resulted in site reaction.  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    4/29/2020: worse ankle/hand > lower back pain and stiffness and she'd like to restart tx.  AM is worse; activity helps. Prolonged morning stiffness.      8/5/2020: feels much better on Cosentyx. Still mild lower back, right plantar fasciitis symptoms, but overall much better. Rash seems to be associated with exposures at work; affecting the hands. Tolerating cosentyx. Back to work now.     2/3/2021: Taking Cosentyx 300 mg every 4 weeks and doing much better than she had been in the past but still achy from time to time and more achy in the few days prior to her next Cosentyx dose.  She says that her insurance is requiring that she changed to Enbrel.  Note that Enbrel was going to be tried in the past but was denied by her insurance.  She is now doing well on Cosentyx and she prefers not changing medication.    8/18/2021: Doing well on Cosentyx 300 mg SQ every 4 weeks.  Not requiring premedication before Cosentyx dosing and not having any injection site reactions.  Occasional ankle ache that is mild and short-lived.  No other joint pain.  No joint swelling or morning stiffness.  Uses naproxen 220 mg, approximately 1-2 times per week    2/23/2022: Arthritis is doing well at this time.  No joint pain or swelling or morning stiffness.  Uses naproxen 1-2 times per week.  Tolerating Cosentyx well.  When off " Cosentyx because of COVID-19 infection she had worsening dermatitis on her hands.  She notes that when she goes to warmer climate during the winter then the dermatitis on her hands significantly improves.  Lotions throughout the day provide minimal benefit.  Fatigue; waking up tired; finding it difficult to exercise because of fatigue; could sleep during the day we given the opportunity to.  Denies snoring.  Denies known apneic episodes.    Today, 8/24/2022: Generally doing well with regard to arthritis except for nighttime symptoms that may have pain up to 5/10 but some nights does not have pain.  She finds it if she uses naproxen 220-440 mg nightly or ibuprofen 600 mg nightly then she sleeps through the night without back pain.  Back pain typically improves when she gets up and walks around.  No back pain throughout the day.  No peripheral arthritis.  Morning stiffness for less than 30 minutes.  No gelling.  No active rash on the palms of her hands.  Fatigue has improved during the summer months and she thinks that the fatigue was seasonal, possible seasonal affective disorder    Denies fevers, chills, nausea, vomiting, constipation, diarrhea. No abdominal pain.  No black or bloody stools or stomach upset.  No chest pain/pressure, palpitations, or shortness of breath.  No eye pain or redness.    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[employer]     ROS   12 point review of system was completed and negative except as noted in the HPI     Active Problem List     Patient Active Problem List   Diagnosis     Contact dermatitis and other eczema, due to unspecified cause     Vitamin D deficiency     CARDIOVASCULAR SCREENING; LDL GOAL LESS THAN 160     Malrotation of intestine     S/P appy     H/O left inguinal hernia repair     Anxiety     Menorrhagia     Admission for sterilization     Psoriatic arthritis (H)     Sacroiliitis (H)     Past Medical History     Past  Medical History:   Diagnosis Date     Chickenpox      PONV (postoperative nausea and vomiting)      Past Surgical History     Past Surgical History:   Procedure Laterality Date     APPENDECTOMY       DILATION AND CURETTAGE, HYSTEROSCOPY, ABLATE ENDOMETRIUM, COMBINED N/A 5/20/2019    Procedure: HYSTEROSCOPY, WITH DILATION AND CURETTAGE  AND ENDOMETRIAL ABLATION, Laparoscopic Bilateral Tubal Ligation;  Surgeon: Mc Purvis MD;  Location: [facility] OR     LAPAROSCOPIC HERNIORRHAPHY INGUINAL  3/28/2012    Procedure:LAPAROSCOPIC HERNIORRHAPHY INGUINAL; Laparoscopic Left Inguinal Herniorrhaphy Possible Right Inguinal Hernia; Surgeon:EMMANUELLE CHAPPELL; Location:[facility] OR     LAPAROSCOPIC TUBAL LIGATION Bilateral 5/20/2019    Procedure: Laparoscopic Tubal Sterilization;  Surgeon: Mc Purvis MD;  Location: [facility] OR     SURGICAL HISTORY OF -   05/20/06    D&C for incomplete AB     Allergy     Allergies   Allergen Reactions     Keflex [Cephalexin Monohydrate] Rash     Morphine Hives     Shellfish Allergy      Sulfa Drugs Rash     Current Medication List     Current Outpatient Medications   Medication Sig     Cholecalciferol (D3 VITAMIN PO)      clobetasol (TEMOVATE) 0.05 % external ointment Apply sparingly to affected area twice daily as needed.  Do not apply to face.     naproxen (NAPROSYN) 375 MG tablet Take 1 tablet (375 mg) by mouth every 12 hours as needed for other (Inflammatory pain)     Secukinumab, 300 MG Dose, (COSENTYX SENSOREADY, 300 MG,) 150 MG/ML SOAJ Inject 300 mg Subcutaneous every 28 days     triamcinolone (KENALOG) 0.1 % external ointment Apply sparingly to affected area three times daily for 14 days.     Omega-3 Fatty Acids (FISH OIL PO)      No current facility-administered medications for this visit.         Social History   See HPI    Family History     Family History   Problem Relation Age of Onset     Psychotic Disorder Maternal Grandmother      Alcohol/Drug Maternal Grandmother          alcohol     Heart Disease Maternal Grandfather         MI     Hypertension Maternal Grandfather      Diabetes Maternal Grandfather      Arthritis Paternal Grandmother         RA     Lipids Paternal Grandmother      Parkinsonism Paternal Grandfather      Heart Disease Paternal Grandfather         pacemaker     Lymphoma Cousin      Breast Cancer Maternal Aunt      Paternal grandmother: RA  Paternal aunt: lupus  Paternal cousin: RA    Physical Exam     Temp Readings from Last 3 Encounters:   02/03/20 98.9  F (37.2  C) (Tympanic)   05/20/19 98.1  F (36.7  C) (Oral)   05/08/19 97.6  F (36.4  C)     BP Readings from Last 5 Encounters:   02/03/20 122/82   01/15/20 136/82   09/11/19 127/87   05/20/19 112/86   05/08/19 127/84     Pulse Readings from Last 1 Encounters:   02/03/20 86     Resp Readings from Last 1 Encounters:   02/03/20 20     Estimated body mass index is 26.76 kg/m  as calculated from the following:    Height as of 2/3/20: 1.524 m (5').    Weight as of 2/3/20: 62.1 kg (137 lb).      GEN: NAD. Healthy appearing adult.   HEENT: MMM.  Anicteric, noninjected sclera. No obvious external lesions of the ear and nose. Hearing intact.  PULM: No increased work of breathing  SKIN: No rash or jaundice seen  PSYCH: Alert. Appropriate.        Labs / Imaging (select studies)       2/21/2022 [facility] labs (seen in [facility]) was normal for creatinine, LFTs, ESR, CRP, CBC, QuantiFERON-TB     -------------------------------------------------------    RF/CCP  Recent Labs   Lab Test 12/12/18  0920   CCPIGG 1   RHF <20     CBC  Recent Labs   Lab Test 08/07/20  0726 03/26/20  0829 01/13/20  0824   WBC 5.2 7.5 6.0   RBC 4.58 4.68 4.98   HGB 13.7 14.2 15.2   HCT 41.6 43.2 44.9   MCV 91 92 90   RDW 12.7 13.5 13.1    230 258   MCH 29.9 30.3 30.5   MCHC 32.9 32.9 33.9   NEUTROPHIL 59.2 54.1 52.8   LYMPH 25.6 29.2 33.7   MONOCYTE 8.8 8.1 8.1   EOSINOPHIL 6.0 8.1 5.1   BASOPHIL 0.4 0.5 0.3   ANEU 3.1 4.1 3.2   ALYM 1.3 2.2  2.0   LAMONT 0.5 0.6 0.5   AEOS 0.3 0.6 0.3   ABAS 0.0 0.0 0.0     CMP  Recent Labs   Lab Test 02/17/21  0000 08/07/20 0726 03/26/20  0829 01/13/20  0824 09/10/19  0820 12/12/18 0920 05/22/17  1612 12/21/14  0847   NA  --   --   --   --   --  138 136 137   POTASSIUM  --   --   --   --   --  4.0 3.8 3.5   CHLORIDE  --   --   --   --   --  106 105 107   CO2  --   --   --   --   --  27 22 24   ANIONGAP  --   --   --   --   --  5 9 6   GLC  --   --   --   --   --  88 100* 90   BUN  --   --   --   --   --  11 12 9   CR 0.95 0.82 0.78 0.76   < > 0.70 0.67 0.63   GFRESTIMATED >60 >90 >90 >90   < > >90 >90  Non  GFR Calc   >90  Non  GFR Calc     GFRESTBLACK >60 >90 >90 >90   < > >90 >90   GFR Calc   >90   GFR Calc     BERNARDO  --   --   --   --   --  8.6 8.7 8.7   BILITOTAL  --  0.4 0.4 0.6   < > 0.3 0.2 0.3   ALBUMIN  --  3.8 3.9 3.9   < > 4.1 4.1 3.8   PROTTOTAL  --  7.2 7.6 7.5   < > 7.3 7.6 7.5   ALKPHOS  --  52 45 40   < > 44 47 46   AST 15 13 13 13   < > 10 14 7   ALT 24 19 25 21   < > 21 16 18    < > = values in this interval not displayed.     Calcium/VitaminD  Recent Labs   Lab Test 12/12/18 0920 05/22/17 1612 12/21/14  0847   BERNARDO 8.6 8.7 8.7     ESR/CRP  Recent Labs   Lab Test 08/07/20  0726 03/26/20  0829 01/13/20  0824   SED 4 5 5   CRP <2.9 <2.9 <2.9     Hepatitis B  Recent Labs   Lab Test 05/02/19  1400 01/06/15  1051   HBCAB Nonreactive  --    HEPBANG  --  Nonreactive     Hepatitis C  Recent Labs   Lab Test 05/02/19  1400   HCVAB Nonreactive     Lyme ab screening  Recent Labs   Lab Test 12/12/18  0920   LYMEGM 0.04     Tuberculosis Screening  Recent Labs   Lab Test 08/07/20  0726 05/02/19  1400   TBRES  --  Negative   TBRST Negative  --      HIV Screening  Recent Labs   Lab Test 01/06/15  1051   HIAGAB Nonreactive   HIV-1 p24 Ag & HIV-1/HIV-2 Ab Not Detected           Immunization History     Immunization History   Administered Date(s) Administered      COVID-19,PF,Moderna 04/29/2022     Influenza (IIV3) PF 10/15/2008, 10/19/2012     Influenza Vaccine IM > 6 months Valent IIV4 (Alfuria,Fluzone) 09/29/2015, 09/26/2016, 10/08/2019, 10/12/2020     Pneumo Conj 13-V (2010&after) 05/08/2019     Pneumococcal 23 valent 09/11/2019     TD (ADULT, 7+) 02/01/2004     TDAP Vaccine (Adacel) 05/26/2015     Zoster vaccine recombinant adjuvanted (SHINGRIX) 10/14/2019, 09/29/2020          Chart documentation done in part with Dragon Voice recognition Software. Although reviewed after completion, some word and grammatical error may remain.      Video-Visit Details    Type of service:  Video Visit    Video Start Time: 10:10 AM    Video End Time:10:18 AM    Originating Location (pt. Location): [place], [place]    Distant Location (provider location):  [place]    Platform used for Video Visit: Sukh Cabrera MD

## 2022-08-26 NOTE — TELEPHONE ENCOUNTER
PA Initiation    Medication: Cosentyx PA Renewal Initiated  Insurance Company: [employer] - Phone [phone] Fax [phone]  Pharmacy Filling the Rx:    Filling Pharmacy Phone:    Filling Pharmacy Fax:    Start Date: 8/26/2022        [facility]ia[facility]  [email or web address]  Phone: [phone]  Fax: [phone]

## 2022-08-30 NOTE — TELEPHONE ENCOUNTER
Prior Authorization Approval    Authorization Effective Date: 7/27/2022  Authorization Expiration Date: 8/26/2023  Medication: Cosentyx PA Renewal Approved  Approved Dose/Quantity: 2 per 28 days  Reference #: [id number]   Insurance Company: [employer] - Phone [phone] Fax [phone]  Expected CoPay:       CoPay Card Available:      Foundation Assistance Needed:    Which Pharmacy is filling the prescription (Not needed for infusion/clinic administered):    Pharmacy Notified:    Patient Notified:  No, renewal        Kathy Lopez [facility] Specialty Pharmacy Liaison  [email or web address]  Phone: [phone]  Fax: [phone]

## 2022-11-19 ENCOUNTER — HEALTH MAINTENANCE LETTER (OUTPATIENT)
Age: 37
End: 2022-11-19

## 2023-02-22 ENCOUNTER — VIRTUAL VISIT (OUTPATIENT)
Dept: RHEUMATOLOGY | Facility: CLINIC | Age: 38
End: 2023-02-22
Payer: COMMERCIAL

## 2023-02-22 DIAGNOSIS — L40.50 PSORIATIC ARTHRITIS (H): ICD-10-CM

## 2023-02-22 PROCEDURE — 99214 OFFICE O/P EST MOD 30 MIN: CPT | Mod: VID | Performed by: INTERNAL MEDICINE

## 2023-02-22 NOTE — PROGRESS NOTES
Shira Forbes  is a 37 year old year old who is being evaluated via a billable video visit.      How would you like to obtain your AVS? MyChart  If the video visit is dropped, the invitation should be resent by: Text to cell phone: [phone]   Will anyone else be joining your video visit? No     Rheumatology Video Visit      Shira Forbes MRN# [id number]   YOB: 1985 Age: 37 year old      Date of visit: 2/22/23   PCP: Clinic, [facility]    Chief Complaint   Patient presents with:  Psoriatic arthritis     Assessment and Plan     1.  Psoriatic Arthritis: Asymmetric arthritis involving her MCPs and PIP consistent with psoriatic arthritis; dx'd on 5/1/2019; prolonged morning stiffness; and pain and stiffness that improved with activity and worsened with inactivity.  She has dry skin on her hands that has an appearance more consistent with eczema than psoriasis initially.  Possible nail pitting on one fingernail initially.  RF, CCP, ANTONIO, dsDNA, PR3, and MPO negative; no cytopenias; normal renal function, ESR, and CRP.  SI joint x-rays suggest sacroiliitis and she has inflammatory back pain.  Hand and chest x-rays were okay.  Humira was started in May 2019 with significant improvement of joints and skin; but benefit began wearing off with symptoms worsening 7 days after dosing - worsening peripheral and axial symptoms; also with injection site reactions that have been worsening with each injection.  Therefore, stopped Humira and Rx'd Enbrel but this was denied; her insurance required that she must fail ONE of cimzia, simponi, stelara, and both cosentyx and xeljanz. Therefore Simponi was used but she had worse skin symptoms and worse arthritis symptoms; so changed to Cosentyx but her insurance required Taltz first.  Taltz caused severe site reaction so it was not repeated.  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  Worsening arthritis symptoms when off of  treatment, so Cosentyx was started and later the dose was increased.  Psoriatic arthritis fairly well controlled on Cosentyx, but does require naproxen 220-440 mg or ibuprofen 600 mg nightly on 3 nights per week for nighttime arthritis symptoms that is effective.  Tolerating NSAIDs well without any black or bloody stools or stomach upset.  Because of NSAID use will check labs periodically.  Chronic illness, stable.    - Continue Cosentyx 300mg SQ every 4 weeks      - Continue naproxen 220-440 mg twice daily as needed for psoriatic arthritis (use sparingly)  - Labs in 6 months: CBC, creatinine, hepatic panel, ESR, CRP (standing lab orders previously faxed to [facility] in [place])    2.  Bilateral hand dermatitis, history: Dermatitis versus psoriasis.  Improves with increased sun exposure.  Does not improve with moisturizers throughout the day.  Not an issue at this time with summer sun exposure and Cosentyx.  If worsening symptoms then should see dermatology.    3.  History of fatigue: Continue regular exercise.  Better during the summer months as she previously explained that this could be seasonal affective disorder     4.  Vaccinations: Vaccinations reviewed with Ms. Forbes.    - Influenza: encouraged yearly vaccination   - COVID-19: Up to date    Total minutes spent in evaluation with patient, documentation, , and review of pertinent studies and chart notes: 18     Ms. Forbes verbalized agreement with and understanding of the rational for the diagnosis and treatment plan.  All questions were answered to best of my ability and the patient's satisfaction. Ms. Forbes was advised to contact the clinic with any questions that may arise after the clinic visit.      Thank you for involving me in the care of the patient    Return to clinic: 6 mo    HPI   Shira Forbes is a 37 year old female with a past medical history significant for contact dermatitis, vitamin D deficiency, and axiety who is seen for  "follow-up of psoriatic arthritis    [facility]  \"I just want to send you a quick update and a thank you! I have been on Humira for 1 month now and feel amazing! I didn't actually realize how miserable I was.  I am sleeping better at night, not exhausted and can move easier.  Also my skin on my hands is totally clear and has been since after my first dose (this has been huge for me!!!!) Thank you so much! Will see you in September.  Truly life changing! Thank you! Jason\"    9/2019: Ms. Forbes reported that fairly quickly with Humira her arthritis improved.  Morning stiffness for less than 5-10 minutes.  Positive gelling phenomenon that was reduced in severity.  Everything got better including her hands where she no longer had cracking of her skin on the hands.  However, after subsequent dosing from Humira, approximately after 1.5-2 months of using it, the duration of benefit to started to weekend.  Symptoms began returning approximately 7 days after taking the Humira dose.  She also started having injection site reactions that have been worsening with each injection.      1/15/2020: Not doing as well as when she was on Humira.  Some \"fogginess\" with Simponi.  Has been on Simponi for more than 3 months.  Feels like her toes are swollen.  Hands have not been doing well; she says when she was on Humira her hands completely cleared up with regard to her skin.  Joint pain worse in the morning and improves with time and activity.  Back and hips are doing okay but other joints are not.  Would like to change medication.    Insurance required Taltz before cosentyx. Taltz resulted in site reaction.  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    4/29/2020: worse ankle/hand > lower back pain and stiffness and she'd like to restart tx.  AM is worse; activity helps. Prolonged morning stiffness.      8/5/2020: feels much better on Cosentyx. Still mild lower back, right plantar " fasciitis symptoms, but overall much better. Rash seems to be associated with exposures at work; affecting the hands. Tolerating cosentyx. Back to work now.     2/3/2021: Taking Cosentyx 300 mg every 4 weeks and doing much better than she had been in the past but still achy from time to time and more achy in the few days prior to her next Cosentyx dose.  She says that her insurance is requiring that she changed to Enbrel.  Note that Enbrel was going to be tried in the past but was denied by her insurance.  She is now doing well on Cosentyx and she prefers not changing medication.    8/18/2021: Doing well on Cosentyx 300 mg SQ every 4 weeks.  Not requiring premedication before Cosentyx dosing and not having any injection site reactions.  Occasional ankle ache that is mild and short-lived.  No other joint pain.  No joint swelling or morning stiffness.  Uses naproxen 220 mg, approximately 1-2 times per week    2/23/2022: Arthritis is doing well at this time.  No joint pain or swelling or morning stiffness.  Uses naproxen 1-2 times per week.  Tolerating Cosentyx well.  When off Cosentyx because of COVID-19 infection she had worsening dermatitis on her hands.  She notes that when she goes to warmer climate during the winter then the dermatitis on her hands significantly improves.  Lotions throughout the day provide minimal benefit.  Fatigue; waking up tired; finding it difficult to exercise because of fatigue; could sleep during the day we given the opportunity to.  Denies snoring.  Denies known apneic episodes.    8/24/2022: Generally doing well with regard to arthritis except for nighttime symptoms that may have pain up to 5/10 but some nights does not have pain.  She finds it if she uses naproxen 220-440 mg nightly or ibuprofen 600 mg nightly then she sleeps through the night without back pain.  Back pain typically improves when she gets up and walks around.  No back pain throughout the day.  No peripheral arthritis.   Morning stiffness for less than 30 minutes.  No gelling.  No active rash on the palms of her hands.  Fatigue has improved during the summer months and she thinks that the fatigue was seasonal, possible seasonal affective disorder    Today: Doing well.  No joint pain or swelling.  Morning stiffness for no more than 30 minutes.  No gelling phenomenon.  Arthritis is not limiting her daily activities.    Denies fevers, chills, nausea, vomiting, constipation, diarrhea. No abdominal pain.  No black or bloody stools or stomach upset.  No chest pain/pressure, palpitations, or shortness of breath.  No eye pain or redness.    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[employer]   12 point review of system was completed and negative except as noted in the HPI     Active Problem List     Patient Active Problem List   Diagnosis     Contact dermatitis and other eczema, due to unspecified cause     Vitamin D deficiency     CARDIOVASCULAR SCREENING; LDL GOAL LESS THAN 160     Malrotation of intestine     S/P appy     H/O left inguinal hernia repair     Anxiety     Menorrhagia     Admission for sterilization     Psoriatic arthritis (H)     Sacroiliitis (H)     Past Medical History     Past Medical History:   Diagnosis Date     Chickenpox      PONV (postoperative nausea and vomiting)      Past Surgical History     Past Surgical History:   Procedure Laterality Date     APPENDECTOMY       DILATION AND CURETTAGE, HYSTEROSCOPY, ABLATE ENDOMETRIUM, COMBINED N/A 5/20/2019    Procedure: HYSTEROSCOPY, WITH DILATION AND CURETTAGE  AND ENDOMETRIAL ABLATION, Laparoscopic Bilateral Tubal Ligation;  Surgeon: Mc Purvis MD;  Location: [facility]     LAPAROSCOPIC HERNIORRHAPHY INGUINAL  3/28/2012    Procedure:LAPAROSCOPIC HERNIORRHAPHY INGUINAL; Laparoscopic Left Inguinal Herniorrhaphy Possible Right Inguinal Hernia; Surgeon:EMMANUELLE CHAPPELL; Location:[facility] OR     LAPAROSCOPIC TUBAL  LIGATION Bilateral 5/20/2019    Procedure: Laparoscopic Tubal Sterilization;  Surgeon: Mc Purvis MD;  Location: [facility] OR     SURGICAL HISTORY OF -   05/20/06    D&C for incomplete AB     Allergy     Allergies   Allergen Reactions     Keflex [Cephalexin Monohydrate] Rash     Morphine Hives     Shellfish Allergy      Sulfa Drugs Rash     Current Medication List     Current Outpatient Medications   Medication Sig     Cholecalciferol (D3 VITAMIN PO)      clobetasol (TEMOVATE) 0.05 % external ointment Apply sparingly to affected area twice daily as needed.  Do not apply to face.     naproxen (NAPROSYN) 375 MG tablet Take 1 tablet (375 mg) by mouth every 12 hours as needed for other (Inflammatory pain)     Secukinumab, 300 MG Dose, (COSENTYX SENSOREADY, 300 MG,) 150 MG/ML SOAJ Inject 300 mg Subcutaneous every 28 days     triamcinolone (KENALOG) 0.1 % external ointment Apply sparingly to affected area three times daily for 14 days.     Omega-3 Fatty Acids (FISH OIL PO)      No current facility-administered medications for this visit.         Social History   See HPI    Family History     Family History   Problem Relation Age of Onset     Psychotic Disorder Maternal Grandmother      Alcohol/Drug Maternal Grandmother         alcohol     Heart Disease Maternal Grandfather         MI     Hypertension Maternal Grandfather      Diabetes Maternal Grandfather      Arthritis Paternal Grandmother         RA     Lipids Paternal Grandmother      Parkinsonism Paternal Grandfather      Heart Disease Paternal Grandfather         pacemaker     Lymphoma Cousin      Breast Cancer Maternal Aunt      Paternal grandmother: RA  Paternal aunt: lupus  Paternal cousin: RA    Physical Exam     Temp Readings from Last 3 Encounters:   02/03/20 98.9  F (37.2  C) (Tympanic)   05/20/19 98.1  F (36.7  C) (Oral)   05/08/19 97.6  F (36.4  C)     BP Readings from Last 5 Encounters:   02/03/20 122/82   01/15/20 136/82   09/11/19 127/87   05/20/19  112/86   05/08/19 127/84     Pulse Readings from Last 1 Encounters:   02/03/20 86     Resp Readings from Last 1 Encounters:   02/03/20 20     Estimated body mass index is 26.76 kg/m  as calculated from the following:    Height as of 2/3/20: 1.524 m (5').    Weight as of 2/3/20: 62.1 kg (137 lb).        GEN: NAD. Healthy appearing adult.   HEENT:  Anicteric, noninjected sclera. No obvious external lesions of the ear and nose. Hearing intact.  PULM: No increased work of breathing  MSK:  Hands and wrists without swelling.  SKIN: No rash or jaundice seen  PSYCH: Alert. Appropriate.        Labs / Imaging (select studies)       2/21/2022 [facility] labs (seen in [facility]) was normal for creatinine, LFTs, ESR, CRP, CBC, QuantiFERON-TB     -------------------------------------------------------    RF/CCP  Recent Labs   Lab Test 12/12/18  0920   CCPIGG 1   RHF <20     CBC  Recent Labs   Lab Test 08/07/20  0726 03/26/20  0829 01/13/20  0824   WBC 5.2 7.5 6.0   RBC 4.58 4.68 4.98   HGB 13.7 14.2 15.2   HCT 41.6 43.2 44.9   MCV 91 92 90   RDW 12.7 13.5 13.1    230 258   MCH 29.9 30.3 30.5   MCHC 32.9 32.9 33.9   NEUTROPHIL 59.2 54.1 52.8   LYMPH 25.6 29.2 33.7   MONOCYTE 8.8 8.1 8.1   EOSINOPHIL 6.0 8.1 5.1   BASOPHIL 0.4 0.5 0.3   ANEU 3.1 4.1 3.2   ALYM 1.3 2.2 2.0   LAMONT 0.5 0.6 0.5   AEOS 0.3 0.6 0.3   ABAS 0.0 0.0 0.0     CMP  Recent Labs   Lab Test 02/17/21  0000 08/07/20  0726 03/26/20  0829 01/13/20  0824 09/10/19  0820 12/12/18  0920 05/22/17  1612 12/21/14  0847   NA  --   --   --   --   --  138 136 137   POTASSIUM  --   --   --   --   --  4.0 3.8 3.5   CHLORIDE  --   --   --   --   --  106 105 107   CO2  --   --   --   --   --  27 22 24   ANIONGAP  --   --   --   --   --  5 9 6   GLC  --   --   --   --   --  88 100* 90   BUN  --   --   --   --   --  11 12 9   CR 0.95 0.82 0.78 0.76   < > 0.70 0.67 0.63   GFRESTIMATED >60 >90 >90 >90   < > >90 >90  Non  GFR Calc   >90  Non   American GFR Calc     GFRESTBLACK >60 >90 >90 >90   < > >90 >90   GFR Calc   >90   GFR Calc     BERNARDO  --   --   --   --   --  8.6 8.7 8.7   BILITOTAL  --  0.4 0.4 0.6   < > 0.3 0.2 0.3   ALBUMIN  --  3.8 3.9 3.9   < > 4.1 4.1 3.8   PROTTOTAL  --  7.2 7.6 7.5   < > 7.3 7.6 7.5   ALKPHOS  --  52 45 40   < > 44 47 46   AST 15 13 13 13   < > 10 14 7   ALT 24 19 25 21   < > 21 16 18    < > = values in this interval not displayed.     Calcium/VitaminD  Recent Labs   Lab Test 12/12/18  0920 05/22/17  1612 12/21/14  0847   BERNARDO 8.6 8.7 8.7     ESR/CRP  Recent Labs   Lab Test 08/07/20  0726 03/26/20  0829 01/13/20  0824   SED 4 5 5   CRP <2.9 <2.9 <2.9     Hepatitis B  Recent Labs   Lab Test 05/02/19  1400 01/06/15  1051   HBCAB Nonreactive  --    HEPBANG  --  Nonreactive     Hepatitis C  Recent Labs   Lab Test 05/02/19  1400   HCVAB Nonreactive     Lyme ab screening  Recent Labs   Lab Test 12/12/18  0920   LYMEGM 0.04       Tuberculosis Screening  Recent Labs   Lab Test 08/07/20  0726 05/02/19  1400   TBRES  --  Negative   TBRST Negative  --      HIV Screening  Recent Labs   Lab Test 01/06/15  1051   HIAGAB Nonreactive   HIV-1 p24 Ag & HIV-1/HIV-2 Ab Not Detected           Immunization History     Immunization History   Administered Date(s) Administered     COVID-19 Vaccine 18+ (Moderna) 04/29/2022, 11/11/2022     Influenza (IIV3) PF 10/15/2008, 10/19/2012     Influenza Vaccine >6 months (Alfuria,Fluzone) 09/29/2015, 09/26/2016, 10/08/2019, 10/12/2020     Pneumo Conj 13-V (2010&after) 05/08/2019     Pneumococcal 23 valent 09/11/2019     TD (ADULT, 7+) 02/01/2004     TDAP Vaccine (Adacel) 05/26/2015     Zoster vaccine recombinant adjuvanted (SHINGRIX) 10/14/2019, 09/29/2020          Chart documentation done in part with Dragon Voice recognition Software. Although reviewed after completion, some word and grammatical error may remain.      Video-Visit Details    Type of service:  Video  Visit    Video Start Time: 10:10 AM    Video End Time:10:18 AM    Originating Location (pt. Location): Home, [place]    Distant Location (provider location):  On site, [place]    Platform used for Video Visit: Sukh Cabrera MD

## 2023-02-22 NOTE — PATIENT INSTRUCTIONS
RHEUMATOLOGY    Dr. Ronnie Cabrera    [facility] [place]  [address] [place] [address]  [place] [address]  Phone number: [phone]  Fax number: [phone]    You may schedule your FLU shot by calling [phone] or if you would like to get your shot at a [place] pharmacy you may schedule online at [email or web address].    Thank you for choosing [facility]!

## 2023-03-01 RX ORDER — SECUKINUMAB 150 MG/ML
300 INJECTION SUBCUTANEOUS
Qty: 2 ML | Refills: 7 | Status: SHIPPED | OUTPATIENT
Start: 2023-03-01 | End: 2023-08-23

## 2023-04-15 ENCOUNTER — HEALTH MAINTENANCE LETTER (OUTPATIENT)
Age: 38
End: 2023-04-15

## 2023-04-22 ENCOUNTER — TELEPHONE (OUTPATIENT)
Dept: RHEUMATOLOGY | Facility: CLINIC | Age: 38
End: 2023-04-22
Payer: COMMERCIAL

## 2023-04-22 NOTE — TELEPHONE ENCOUNTER
Prior Authorization Retail Medication Request    Medication/Dose: celecoxib (CELEBREX) 100 MG capsule  ICD code (if different than what is on RX):    Previously Tried and Failed:    Rationale:      Insurance Name:    Insurance ID:        Pharmacy Information (if different than what is on RX)  Name:    Phone:

## 2023-04-26 NOTE — TELEPHONE ENCOUNTER
Prior Authorization Approval        Authorization Effective Date: 3/27/2023  Authorization Expiration Date: 4/25/2024  Medication: celecoxib (CELEBREX) 100 MG capsule-PA APPROVED   Approved Dose/Quantity:   Reference #:     Insurance Company: [employer] - Phone [phone] Fax [phone]  Expected CoPay:       CoPay Card Available:      Foundation Assistance Needed:    Which Pharmacy is filling the prescription (Not needed for infusion/clinic administered): [facility] - [address]  Pharmacy Notified: Yes- **Instructed pharmacy to notify patient when script is ready to /ship.**  Patient Notified: Yes

## 2023-04-26 NOTE — TELEPHONE ENCOUNTER
Central Prior Authorization Team   Phone: [phone]    PA Initiation    Medication: celecoxib (CELEBREX) 100 MG capsule  Insurance Company: [employer] - Phone [phone] Fax [phone]  Pharmacy Filling the Rx: [address]  Filling Pharmacy Phone: [phone]  Filling Pharmacy Fax:    Start Date: 4/26/2023

## 2023-07-31 ENCOUNTER — TELEPHONE (OUTPATIENT)
Dept: RHEUMATOLOGY | Facility: CLINIC | Age: 38
End: 2023-07-31
Payer: COMMERCIAL

## 2023-07-31 NOTE — TELEPHONE ENCOUNTER
Prior Authorization Approval    Medication: COSENTYX SENSOREADY  MG/ML SC SOAJ  Authorization Effective Date: 7/1/2023  Authorization Expiration Date: 7/30/2024  Approved Dose/Quantity: 2 pens per 28 days  Reference #: [id number]   Insurance Company: [employer] - Phone [phone] Fax [phone]  Expected CoPay:       CoPay Card Available:      Financial Assistance Needed:   Which Pharmacy is filling the prescription: [facility] [address]  Pharmacy Notified: Yes  Patient Notified: Yes

## 2023-07-31 NOTE — TELEPHONE ENCOUNTER
PA Initiation    Medication: COSENTYX SENSOREADY  MG/ML SC SOAJ  Insurance Company: [employer] - Phone [phone] Fax [phone]  Pharmacy Filling the Rx: [facility], [address]  Filling Pharmacy Phone:    Filling Pharmacy Fax:    Start Date: 7/31/2023  [id number]

## 2023-08-23 ENCOUNTER — VIRTUAL VISIT (OUTPATIENT)
Dept: RHEUMATOLOGY | Facility: CLINIC | Age: 38
End: 2023-08-23
Payer: COMMERCIAL

## 2023-08-23 DIAGNOSIS — Z79.1 NSAID LONG-TERM USE: ICD-10-CM

## 2023-08-23 DIAGNOSIS — L40.50 PSORIATIC ARTHRITIS (H): Primary | ICD-10-CM

## 2023-08-23 PROCEDURE — 99214 OFFICE O/P EST MOD 30 MIN: CPT | Mod: VID | Performed by: INTERNAL MEDICINE

## 2023-08-23 RX ORDER — SECUKINUMAB 150 MG/ML
300 INJECTION SUBCUTANEOUS
Qty: 2 ML | Refills: 7 | Status: SHIPPED | OUTPATIENT
Start: 2023-08-23 | End: 2024-02-28

## 2023-08-23 NOTE — PROGRESS NOTES
Shira Forbes  is a 38 year old year old who is being evaluated via a billable video visit.      How would you like to obtain your AVS? MyChart  If the video visit is dropped, the invitation should be resent by: Text to cell phone: [phone]   Will anyone else be joining your video visit? No     Rheumatology Video Visit      Shira Forbes MRN# [id number]   YOB: 1985 Age: 38 year old      Date of visit: 8/23/23   PCP: Clinic, [facility]    Chief Complaint   Patient presents with:  Psoriatic arthritis    Assessment and Plan     1.  Psoriatic Arthritis: Asymmetric arthritis involving her MCPs and PIP consistent with psoriatic arthritis; dx'd on 5/1/2019; prolonged morning stiffness; and pain and stiffness that improved with activity and worsened with inactivity.  She has dry skin on her hands that has an appearance more consistent with eczema than psoriasis initially.  Possible nail pitting on one fingernail initially.  RF, CCP, ANTONIO, dsDNA, PR3, and MPO negative; no cytopenias; normal renal function, ESR, and CRP.  SI joint x-rays suggest sacroiliitis and she has inflammatory back pain.  Hand and chest x-rays were okay.  Humira was started in May 2019 with significant improvement of joints and skin; but benefit began wearing off with symptoms worsening 7 days after dosing - worsening peripheral and axial symptoms; also with injection site reactions that have been worsening with each injection.  Therefore, stopped Humira and Rx'd Enbrel but this was denied; her insurance required that she must fail ONE of cimzia, simponi, stelara, and both cosentyx and xeljanz. Therefore Simponi was used but she had worse skin symptoms and worse arthritis symptoms; so changed to Cosentyx but her insurance required Taltz first.  Taltz caused severe site reaction so it was not repeated.  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  Worsening arthritis symptoms when off of  "treatment, so Cosentyx was started and later the dose was increased.  Psoriatic arthritis controlled at this time.  Note that she is not using naproxen or ibuprofen because of one-time episode of angioedema; has Celebrex now that is well-tolerated and is used infrequently, approximately once monthly.    Chronic illness, stable.    - Continue Cosentyx 300mg SQ every 4 weeks      - Continue Celebrex 100 mg twice daily as needed (using approximately once monthly)   - Labs now and in 6 months: CBC, creatinine, hepatic panel (orders given to Samaria to fax to [facility] in [place])    2.  Bilateral hand dermatitis, history: Dermatitis versus psoriasis.  Improves with increased sun exposure.  Does not improve with moisturizers throughout the day.  Not an issue at this time with summer sun exposure and Cosentyx.  If worsening symptoms then should see dermatology.    3.  History of fatigue: Continue regular exercise.  Better during the summer months when her exercises regimen is more routine.    4.  Vaccinations: Vaccinations reviewed with Ms. Forbes.    - Influenza: encouraged yearly vaccination   - COVID-19: Advised keeping updated    Total minutes spent in evaluation with patient, documentation, , and review of pertinent studies and chart notes: 16     Ms. Forbes verbalized agreement with and understanding of the rational for the diagnosis and treatment plan.  All questions were answered to best of my ability and the patient's satisfaction. Ms. Forbes was advised to contact the clinic with any questions that may arise after the clinic visit.      Thank you for involving me in the care of the patient    Return to clinic: 6 mo, and office    HPI   Shira Forbes is a 38 year old female with a past medical history significant for contact dermatitis, vitamin D deficiency, and axiety who is seen for follow-up of psoriatic arthritis    MyChart message  \"I just want to send you a quick update and a thank you! I " "have been on Humira for 1 month now and feel amazing! I didn't actually realize how miserable I was.  I am sleeping better at night, not exhausted and can move easier.  Also my skin on my hands is totally clear and has been since after my first dose (this has been huge for me!!!!) Thank you so much! Will see you in September.  Truly life changing! Thank you! Jason\"    9/2019: Ms. Forbes reported that fairly quickly with Humira her arthritis improved.  Morning stiffness for less than 5-10 minutes.  Positive gelling phenomenon that was reduced in severity.  Everything got better including her hands where she no longer had cracking of her skin on the hands.  However, after subsequent dosing from Humira, approximately after 1.5-2 months of using it, the duration of benefit to started to weekend.  Symptoms began returning approximately 7 days after taking the Humira dose.  She also started having injection site reactions that have been worsening with each injection.      1/15/2020: Not doing as well as when she was on Humira.  Some \"fogginess\" with Simponi.  Has been on Simponi for more than 3 months.  Feels like her toes are swollen.  Hands have not been doing well; she says when she was on Humira her hands completely cleared up with regard to her skin.  Joint pain worse in the morning and improves with time and activity.  Back and hips are doing okay but other joints are not.  Would like to change medication.    Insurance required Taltz before cosentyx. Taltz resulted in site reaction.  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    4/29/2020: worse ankle/hand > lower back pain and stiffness and she'd like to restart tx.  AM is worse; activity helps. Prolonged morning stiffness.      8/5/2020: feels much better on Cosentyx. Still mild lower back, right plantar fasciitis symptoms, but overall much better. Rash seems to be associated with exposures at work; affecting the hands. " Tolerating cosentyx. Back to work now.     2/3/2021: Taking Cosentyx 300 mg every 4 weeks and doing much better than she had been in the past but still achy from time to time and more achy in the few days prior to her next Cosentyx dose.  She says that her insurance is requiring that she changed to Enbrel.  Note that Enbrel was going to be tried in the past but was denied by her insurance.  She is now doing well on Cosentyx and she prefers not changing medication.    8/18/2021: Doing well on Cosentyx 300 mg SQ every 4 weeks.  Not requiring premedication before Cosentyx dosing and not having any injection site reactions.  Occasional ankle ache that is mild and short-lived.  No other joint pain.  No joint swelling or morning stiffness.  Uses naproxen 220 mg, approximately 1-2 times per week    2/23/2022: Arthritis is doing well at this time.  No joint pain or swelling or morning stiffness.  Uses naproxen 1-2 times per week.  Tolerating Cosentyx well.  When off Cosentyx because of COVID-19 infection she had worsening dermatitis on her hands.  She notes that when she goes to warmer climate during the winter then the dermatitis on her hands significantly improves.  Lotions throughout the day provide minimal benefit.  Fatigue; waking up tired; finding it difficult to exercise because of fatigue; could sleep during the day we given the opportunity to.  Denies snoring.  Denies known apneic episodes.    8/24/2022: Generally doing well with regard to arthritis except for nighttime symptoms that may have pain up to 5/10 but some nights does not have pain.  She finds it if she uses naproxen 220-440 mg nightly or ibuprofen 600 mg nightly then she sleeps through the night without back pain.  Back pain typically improves when she gets up and walks around.  No back pain throughout the day.  No peripheral arthritis.  Morning stiffness for less than 30 minutes.  No gelling.  No active rash on the palms of her hands.  Fatigue has  improved during the summer months and she thinks that the fatigue was seasonal, possible seasonal affective disorder    Today, 8/23/2023: Currently doing well.  No joint pain or swelling.  Morning stiffness for no more than 30 minutes.  No gelling phenomenon.  Arthritis does not limit her daily activities.  No longer using ibuprofen or naproxen because of the one-time episode of angioedema; now has Celebrex on hand to use as needed, used about once monthly.  Exercising regularly and feels well.  No fatigue currently.    Denies fevers, chills, nausea, vomiting, constipation, diarrhea. No abdominal pain.  No black or bloody stools or stomach upset.  No chest pain/pressure, palpitations, or shortness of breath.  No eye pain or redness.    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[employer]   12 point review of system was completed and negative except as noted in the HPI     Active Problem List     Patient Active Problem List   Diagnosis    Contact dermatitis and other eczema, due to unspecified cause    Vitamin D deficiency    CARDIOVASCULAR SCREENING; LDL GOAL LESS THAN 160    Malrotation of intestine    S/P appy    H/O left inguinal hernia repair    Anxiety    Menorrhagia    Admission for sterilization    Psoriatic arthritis (H)    Sacroiliitis (H)     Past Medical History     Past Medical History:   Diagnosis Date    Chickenpox     PONV (postoperative nausea and vomiting)      Past Surgical History     Past Surgical History:   Procedure Laterality Date    APPENDECTOMY      DILATION AND CURETTAGE, HYSTEROSCOPY, ABLATE ENDOMETRIUM, COMBINED N/A 5/20/2019    Procedure: HYSTEROSCOPY, WITH DILATION AND CURETTAGE  AND ENDOMETRIAL ABLATION, Laparoscopic Bilateral Tubal Ligation;  Surgeon: Mc Purvis MD;  Location: [facility] OR    LAPAROSCOPIC HERNIORRHAPHY INGUINAL  3/28/2012    Procedure:LAPAROSCOPIC HERNIORRHAPHY INGUINAL; Laparoscopic Left Inguinal  Herniorrhaphy Possible Right Inguinal Hernia; Surgeon:EMMANUELLE CHAPPELL; Location:[facility] OR    LAPAROSCOPIC TUBAL LIGATION Bilateral 5/20/2019    Procedure: Laparoscopic Tubal Sterilization;  Surgeon: Mc Purvis MD;  Location: [facility] OR    SURGICAL HISTORY OF -   05/20/06    D&C for incomplete AB     Allergy     Allergies   Allergen Reactions    Keflex [Cephalexin Monohydrate] Rash    Morphine Hives    Shellfish Allergy     Sulfa Antibiotics Rash     Current Medication List     Current Outpatient Medications   Medication Sig    celecoxib (CELEBREX) 100 MG capsule Take 1 capsule (100 mg) by mouth 2 times daily . Stop taking if any associated GI upset.    Cholecalciferol (D3 VITAMIN PO)     clobetasol (TEMOVATE) 0.05 % external ointment Apply sparingly to affected area twice daily as needed.  Do not apply to face.    Secukinumab, 300 MG Dose, (COSENTYX SENSOREADY, 300 MG,) 150 MG/ML SOAJ Inject 300 mg Subcutaneous every 28 days    triamcinolone (KENALOG) 0.1 % external ointment Apply sparingly to affected area three times daily for 14 days.    Omega-3 Fatty Acids (FISH OIL PO)      No current facility-administered medications for this visit.         Social History   See HPI    Family History     Family History   Problem Relation Age of Onset    Psychotic Disorder Maternal Grandmother     Alcohol/Drug Maternal Grandmother         alcohol    Heart Disease Maternal Grandfather         MI    Hypertension Maternal Grandfather     Diabetes Maternal Grandfather     Arthritis Paternal Grandmother         RA    Lipids Paternal Grandmother     Parkinsonism Paternal Grandfather     Heart Disease Paternal Grandfather         pacemaker    Lymphoma Cousin     Breast Cancer Maternal Aunt      Paternal grandmother: RA  Paternal aunt: lupus  Paternal cousin: RA    Physical Exam     Temp Readings from Last 3 Encounters:   02/03/20 98.9  F (37.2  C) (Tympanic)   05/20/19 98.1  F (36.7  C) (Oral)   05/08/19 97.6  F (36.4  C)     BP  Readings from Last 5 Encounters:   02/03/20 122/82   01/15/20 136/82   09/11/19 127/87   05/20/19 112/86   05/08/19 127/84     Pulse Readings from Last 1 Encounters:   02/03/20 86     Resp Readings from Last 1 Encounters:   02/03/20 20     Estimated body mass index is 26.76 kg/m  as calculated from the following:    Height as of 2/3/20: 1.524 m (5').    Weight as of 2/3/20: 62.1 kg (137 lb).    GEN: NAD. Healthy appearing adult.   HEENT:  Anicteric, noninjected sclera. No obvious external lesions of the ear and nose. Hearing intact.  PULM: No increased work of breathing  MSK:  Hands and wrists without swelling.  SKIN: No rash or jaundice seen  PSYCH: Alert. Appropriate.     Labs / Imaging (select studies)       2/21/2022 [facility] labs (seen in [facility]) was normal for creatinine, LFTs, ESR, CRP, CBC, QuantiFERON-TB     -------------------------------------------------------    RF/CCP  Recent Labs   Lab Test 12/12/18  0920   CCPIGG 1   RHF <20     CBC  Recent Labs   Lab Test 08/07/20  0726 03/26/20  0829 01/13/20  0824   WBC 5.2 7.5 6.0   RBC 4.58 4.68 4.98   HGB 13.7 14.2 15.2   HCT 41.6 43.2 44.9   MCV 91 92 90   RDW 12.7 13.5 13.1    230 258   MCH 29.9 30.3 30.5   MCHC 32.9 32.9 33.9   NEUTROPHIL 59.2 54.1 52.8   LYMPH 25.6 29.2 33.7   MONOCYTE 8.8 8.1 8.1   EOSINOPHIL 6.0 8.1 5.1   BASOPHIL 0.4 0.5 0.3   ANEU 3.1 4.1 3.2   ALYM 1.3 2.2 2.0   LAMONT 0.5 0.6 0.5   AEOS 0.3 0.6 0.3   ABAS 0.0 0.0 0.0     CMP  Recent Labs   Lab Test 02/17/21  0000 08/07/20  0726 03/26/20  0829 01/13/20  0824 09/10/19  0820 12/12/18  0920 05/22/17  1612 12/21/14  0847   NA  --   --   --   --   --  138 136 137   POTASSIUM  --   --   --   --   --  4.0 3.8 3.5   CHLORIDE  --   --   --   --   --  106 105 107   CO2  --   --   --   --   --  27 22 24   ANIONGAP  --   --   --   --   --  5 9 6   GLC  --   --   --   --   --  88 100* 90   BUN  --   --   --   --   --  11 12 9   CR 0.95 0.82 0.78 0.76   < > 0.70 0.67 0.63    GFRESTIMATED >60 >90 >90 >90   < > >90 >90  Non  GFR Calc   >90  Non  GFR Calc     GFRESTBLACK >60 >90 >90 >90   < > >90 >90   GFR Calc   >90   GFR Calc     BERNARDO  --   --   --   --   --  8.6 8.7 8.7   BILITOTAL  --  0.4 0.4 0.6   < > 0.3 0.2 0.3   ALBUMIN  --  3.8 3.9 3.9   < > 4.1 4.1 3.8   PROTTOTAL  --  7.2 7.6 7.5   < > 7.3 7.6 7.5   ALKPHOS  --  52 45 40   < > 44 47 46   AST 15 13 13 13   < > 10 14 7   ALT 24 19 25 21   < > 21 16 18    < > = values in this interval not displayed.     Calcium/VitaminD  Recent Labs   Lab Test 12/12/18  0920 05/22/17  1612 12/21/14  0847   BERNARDO 8.6 8.7 8.7     ESR/CRP  Recent Labs   Lab Test 08/07/20  0726 03/26/20  0829 01/13/20  0824   SED 4 5 5   CRP <2.9 <2.9 <2.9     Hepatitis B  Recent Labs   Lab Test 05/02/19  1400 01/06/15  1051   HBCAB Nonreactive  --    HEPBANG  --  Nonreactive     Hepatitis C  Recent Labs   Lab Test 05/02/19  1400   HCVAB Nonreactive     Lyme ab screening  Recent Labs   Lab Test 12/12/18  0920   LYMEGM 0.04       Tuberculosis Screening  Recent Labs   Lab Test 08/07/20  0726 05/02/19  1400   TBRES  --  Negative   TBRST Negative  --      HIV Screening  Recent Labs   Lab Test 01/06/15  1051   HIAGAB Nonreactive   HIV-1 p24 Ag & HIV-1/HIV-2 Ab Not Detected           Immunization History     Immunization History   Administered Date(s) Administered    COVID-19 Bivalent 18+ (Moderna) 11/11/2022    COVID-19 Monovalent 18+ (Moderna) 04/29/2022, 11/11/2022    Influenza (IIV3) PF 10/15/2008, 10/19/2012    Influenza Vaccine >6 months (Alfuria,Fluzone) 09/29/2015, 09/26/2016, 10/08/2019, 10/12/2020    Pneumo Conj 13-V (2010&after) 05/08/2019    Pneumococcal 23 valent 09/11/2019    TD,PF 7+ (Tenivac) 02/01/2004    TDAP Vaccine (Adacel) 05/26/2015    Zoster recombinant adjuvanted (SHINGRIX) 10/14/2019, 09/29/2020          Chart documentation done in part with Dragon Voice recognition Software. Although  reviewed after completion, some word and grammatical error may remain.    Video-Visit Details    Type of service:  Video Visit    Video Start Time: 10:07 AM    Video End Time: 10:15 AM    Originating Location (pt. Location): [place], [place]    [place]    Platform used for Video Visit: Sukh Cabrera MD

## 2023-08-23 NOTE — PATIENT INSTRUCTIONS
RHEUMATOLOGY    [facility] [place]  [address] [place] [address]  [place] [address]    Phone number: [phone]  Fax number: [phone]    If you need a medication refill, please contact us as you may need lab work and/or a follow up visit prior to your refill.      Thank you for choosing [facility]!    Samaria Ye CMA Rheumatology

## 2024-01-03 DIAGNOSIS — R74.01 ELEVATED ALT MEASUREMENT: Primary | ICD-10-CM

## 2024-02-28 ENCOUNTER — OFFICE VISIT (OUTPATIENT)
Dept: RHEUMATOLOGY | Facility: CLINIC | Age: 39
End: 2024-02-28
Payer: COMMERCIAL

## 2024-02-28 VITALS
BODY MASS INDEX: 28.71 KG/M2 | DIASTOLIC BLOOD PRESSURE: 82 MMHG | HEART RATE: 71 BPM | SYSTOLIC BLOOD PRESSURE: 135 MMHG | RESPIRATION RATE: 16 BRPM | OXYGEN SATURATION: 99 % | WEIGHT: 147 LBS

## 2024-02-28 DIAGNOSIS — L40.50 PSORIATIC ARTHRITIS (H): ICD-10-CM

## 2024-02-28 DIAGNOSIS — D84.9 IMMUNOCOMPROMISED (H): ICD-10-CM

## 2024-02-28 DIAGNOSIS — Z79.1 NSAID LONG-TERM USE: ICD-10-CM

## 2024-02-28 DIAGNOSIS — R74.01 ELEVATED ALT MEASUREMENT: Primary | ICD-10-CM

## 2024-02-28 LAB
ALBUMIN SERPL BCG-MCNC: 4.5 G/DL (ref 3.5–5.2)
ALP SERPL-CCNC: 49 U/L (ref 40–150)
ALT SERPL W P-5'-P-CCNC: 77 U/L (ref 0–50)
AST SERPL W P-5'-P-CCNC: 34 U/L (ref 0–45)
BASOPHILS # BLD AUTO: 0 10E3/UL (ref 0–0.2)
BASOPHILS NFR BLD AUTO: 1 %
BILIRUB DIRECT SERPL-MCNC: <0.2 MG/DL (ref 0–0.3)
BILIRUB SERPL-MCNC: 0.3 MG/DL
CREAT SERPL-MCNC: 0.75 MG/DL (ref 0.51–0.95)
EGFRCR SERPLBLD CKD-EPI 2021: >90 ML/MIN/1.73M2
EOSINOPHIL # BLD AUTO: 0.2 10E3/UL (ref 0–0.7)
EOSINOPHIL NFR BLD AUTO: 3 %
ERYTHROCYTE [DISTWIDTH] IN BLOOD BY AUTOMATED COUNT: 12.6 % (ref 10–15)
HBV CORE AB SERPL QL IA: NONREACTIVE
HBV SURFACE AG SERPL QL IA: NONREACTIVE
HCT VFR BLD AUTO: 44.6 % (ref 35–47)
HCV AB SERPL QL IA: NONREACTIVE
HGB BLD-MCNC: 14.8 G/DL (ref 11.7–15.7)
IMM GRANULOCYTES # BLD: 0 10E3/UL
IMM GRANULOCYTES NFR BLD: 0 %
LYMPHOCYTES # BLD AUTO: 1.6 10E3/UL (ref 0.8–5.3)
LYMPHOCYTES NFR BLD AUTO: 27 %
MCH RBC QN AUTO: 30.1 PG (ref 26.5–33)
MCHC RBC AUTO-ENTMCNC: 33.2 G/DL (ref 31.5–36.5)
MCV RBC AUTO: 91 FL (ref 78–100)
MONOCYTES # BLD AUTO: 0.4 10E3/UL (ref 0–1.3)
MONOCYTES NFR BLD AUTO: 6 %
NEUTROPHILS # BLD AUTO: 3.8 10E3/UL (ref 1.6–8.3)
NEUTROPHILS NFR BLD AUTO: 64 %
PLATELET # BLD AUTO: 230 10E3/UL (ref 150–450)
PROT SERPL-MCNC: 7.3 G/DL (ref 6.4–8.3)
RBC # BLD AUTO: 4.91 10E6/UL (ref 3.8–5.2)
WBC # BLD AUTO: 6 10E3/UL (ref 4–11)

## 2024-02-28 PROCEDURE — 36415 COLL VENOUS BLD VENIPUNCTURE: CPT | Performed by: INTERNAL MEDICINE

## 2024-02-28 PROCEDURE — 99214 OFFICE O/P EST MOD 30 MIN: CPT | Performed by: INTERNAL MEDICINE

## 2024-02-28 PROCEDURE — 83516 IMMUNOASSAY NONANTIBODY: CPT | Mod: 90 | Performed by: INTERNAL MEDICINE

## 2024-02-28 PROCEDURE — 86803 HEPATITIS C AB TEST: CPT | Performed by: INTERNAL MEDICINE

## 2024-02-28 PROCEDURE — 82565 ASSAY OF CREATININE: CPT | Performed by: INTERNAL MEDICINE

## 2024-02-28 PROCEDURE — 80076 HEPATIC FUNCTION PANEL: CPT | Performed by: INTERNAL MEDICINE

## 2024-02-28 PROCEDURE — 86038 ANTINUCLEAR ANTIBODIES: CPT | Performed by: INTERNAL MEDICINE

## 2024-02-28 PROCEDURE — 86704 HEP B CORE ANTIBODY TOTAL: CPT | Performed by: INTERNAL MEDICINE

## 2024-02-28 PROCEDURE — 86381 MITOCHONDRIAL ANTIBODY EACH: CPT | Performed by: INTERNAL MEDICINE

## 2024-02-28 PROCEDURE — 85025 COMPLETE CBC W/AUTO DIFF WBC: CPT | Performed by: INTERNAL MEDICINE

## 2024-02-28 PROCEDURE — G2211 COMPLEX E/M VISIT ADD ON: HCPCS | Performed by: INTERNAL MEDICINE

## 2024-02-28 PROCEDURE — 87340 HEPATITIS B SURFACE AG IA: CPT | Performed by: INTERNAL MEDICINE

## 2024-02-28 PROCEDURE — 99000 SPECIMEN HANDLING OFFICE-LAB: CPT | Performed by: INTERNAL MEDICINE

## 2024-02-28 RX ORDER — SECUKINUMAB 150 MG/ML
300 INJECTION SUBCUTANEOUS
Qty: 2 ML | Refills: 7 | Status: SHIPPED | OUTPATIENT
Start: 2024-02-28

## 2024-02-28 ASSESSMENT — PAIN SCALES - GENERAL: PAINLEVEL: NO PAIN (0)

## 2024-02-28 NOTE — NURSING NOTE
RAPID3 (0-30) Cumulative Score  2.7          RAPID3 Weighted Score (divide #4 by 3 and that is the weighted score)  0.9

## 2024-02-28 NOTE — PROGRESS NOTES
Rheumatology Clinic Visit      Shira Forbes MRN# [id number]   YOB: 1985 Age: 38 year old      Date of visit: 2/28/24   PCP: Caleb, [facility]    Chief Complaint   Patient presents with:  RECHECK: Psoriatic Arthritis- follow-up medication     Assessment and Plan     1.  Psoriatic Arthritis: Asymmetric arthritis involving her MCPs and PIP consistent with psoriatic arthritis; dx'd on 5/1/2019; prolonged morning stiffness; and pain and stiffness that improved with activity and worsened with inactivity.  She has dry skin on her hands that has an appearance more consistent with eczema than psoriasis initially.  Possible nail pitting on one fingernail initially.  RF, CCP, ANTONIO, dsDNA, PR3, and MPO negative; no cytopenias; normal renal function, ESR, and CRP.  SI joint x-rays suggest sacroiliitis and she has inflammatory back pain.  Hand and chest x-rays were okay.  Humira was started in May 2019 with significant improvement of joints and skin; but benefit began wearing off with symptoms worsening 7 days after dosing - worsening peripheral and axial symptoms; also with injection site reactions that have been worsening with each injection.  Therefore, stopped Humira and Rx'd Enbrel but this was denied; her insurance required that she must fail ONE of cimzia, simponi, stelara, and both cosentyx and xeljanz. Therefore Simponi was used but she had worse skin symptoms and worse arthritis symptoms; so changed to Cosentyx but her insurance required Taltz first.  Taltz caused severe site reaction so it was not repeated.  Then off medications because she was potentially working with COVID19 patients and during that time she had worsening arthritis symptoms when off of treatment, so Cosentyx was started and later the dose was increased.  Psoriatic arthritis controlled at this time.  Note that she is not using naproxen or ibuprofen because of one-time episode of angioedema; has Celebrex now that is  well-tolerated and is used infrequently, approximately once monthly.    Chronic illness, stable.    - Continue Cosentyx 300mg SQ every 4 weeks      - Continue Celebrex 100 mg twice daily as needed (using approximately once monthly)     2.  Bilateral hand dermatitis, history: Dermatitis versus psoriasis.  Improves with increased sun exposure.  Does not improve with moisturizers throughout the day.  Not an issue at this time with summer sun exposure and Cosentyx.  If worsening symptoms then should see dermatology.    3.  History of fatigue: Continue regular exercise.  Better during the summer months when her exercises regimen is more routine.    4.  Bilateral lateral ankle pain: No swelling or increased warmth.  Worse after running.  Advised wearing supportive shoes whenever ambulatory, indoors and outdoors.    5.  LFT elevation:  on 1/18/2024 outside labs.  Mild fatty liver seen on outside ultrasound.  Additional labs today.  Discussed possibly needing gastroenterology input.  She is working on diet for weight loss.  She is planning to have fasting glucose, hemoglobin A1c, and lipid panel with her PCP next week (she is not fasting currently).  - Labs today: Hepatitis B core antibody and surface antigen, hepatitis C antibody, smooth muscle antibody, antimitochondrial antibody, ANTONIO    4.  Vaccinations: Vaccinations reviewed with Ms. Forbes.    - Influenza: encouraged yearly vaccination   - COVID-19: Advised keeping updated    Total minutes spent in evaluation with patient, documentation, , and review of pertinent studies and chart notes: 20  The longitudinal plan of care for the rheumatology problem(s) were addressed during this visit.  Due to added complexity of care, we will continue to support the patient and the subsequent management of this condition with ongoing continuity of care.      Ms. Forbes verbalized agreement with and understanding of the rational for the diagnosis and treatment  "plan.  All questions were answered to best of my ability and the patient's satisfaction. Ms. Forbes was advised to contact the clinic with any questions that may arise after the clinic visit.      Thank you for involving me in the care of the patient    Return to clinic: 6 mo    HPI   Shira Forbes is a 38 year old female with a past medical history significant for contact dermatitis, vitamin D deficiency, and axiety who is seen for follow-up of psoriatic arthritis    [facility]  \"I just want to send you a quick update and a thank you! I have been on Humira for 1 month now and feel amazing! I didn't actually realize how miserable I was.  I am sleeping better at night, not exhausted and can move easier.  Also my skin on my hands is totally clear and has been since after my first dose (this has been huge for me!!!!) Thank you so much! Will see you in September.  Truly life changing! Thank you! Jason\"    9/2019: Ms. Forbes reported that fairly quickly with Humira her arthritis improved.  Morning stiffness for less than 5-10 minutes.  Positive gelling phenomenon that was reduced in severity.  Everything got better including her hands where she no longer had cracking of her skin on the hands.  However, after subsequent dosing from Humira, approximately after 1.5-2 months of using it, the duration of benefit to started to weekend.  Symptoms began returning approximately 7 days after taking the Humira dose.  She also started having injection site reactions that have been worsening with each injection.      1/15/2020: Not doing as well as when she was on Humira.  Some \"fogginess\" with Simponi.  Has been on Simponi for more than 3 months.  Feels like her toes are swollen.  Hands have not been doing well; she says when she was on Humira her hands completely cleared up with regard to her skin.  Joint pain worse in the morning and improves with time and activity.  Back and hips are doing okay but other joints are " not.  Would like to change medication.    Insurance required Taltz before cosentyx. Taltz resulted in site reaction.  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    4/29/2020: worse ankle/hand > lower back pain and stiffness and she'd like to restart tx.  AM is worse; activity helps. Prolonged morning stiffness.      8/5/2020: feels much better on Cosentyx. Still mild lower back, right plantar fasciitis symptoms, but overall much better. Rash seems to be associated with exposures at work; affecting the hands. Tolerating cosentyx. Back to work now.     2/3/2021: Taking Cosentyx 300 mg every 4 weeks and doing much better than she had been in the past but still achy from time to time and more achy in the few days prior to her next Cosentyx dose.  She says that her insurance is requiring that she changed to Enbrel.  Note that Enbrel was going to be tried in the past but was denied by her insurance.  She is now doing well on Cosentyx and she prefers not changing medication.    8/18/2021: Doing well on Cosentyx 300 mg SQ every 4 weeks.  Not requiring premedication before Cosentyx dosing and not having any injection site reactions.  Occasional ankle ache that is mild and short-lived.  No other joint pain.  No joint swelling or morning stiffness.  Uses naproxen 220 mg, approximately 1-2 times per week    2/23/2022: Arthritis is doing well at this time.  No joint pain or swelling or morning stiffness.  Uses naproxen 1-2 times per week.  Tolerating Cosentyx well.  When off Cosentyx because of COVID-19 infection she had worsening dermatitis on her hands.  She notes that when she goes to warmer climate during the winter then the dermatitis on her hands significantly improves.  Lotions throughout the day provide minimal benefit.  Fatigue; waking up tired; finding it difficult to exercise because of fatigue; could sleep during the day we given the opportunity to.  Denies snoring.  Denies  known apneic episodes.    8/24/2022: Generally doing well with regard to arthritis except for nighttime symptoms that may have pain up to 5/10 but some nights does not have pain.  She finds it if she uses naproxen 220-440 mg nightly or ibuprofen 600 mg nightly then she sleeps through the night without back pain.  Back pain typically improves when she gets up and walks around.  No back pain throughout the day.  No peripheral arthritis.  Morning stiffness for less than 30 minutes.  No gelling.  No active rash on the palms of her hands.  Fatigue has improved during the summer months and she thinks that the fatigue was seasonal, possible seasonal affective disorder    8/23/2023: Currently doing well.  No joint pain or swelling.  Morning stiffness for no more than 30 minutes.  No gelling phenomenon.  Arthritis does not limit her daily activities.  No longer using ibuprofen or naproxen because of the one-time episode of angioedema; now has Celebrex on hand to use as needed, used about once monthly.  Exercising regularly and feels well.  No fatigue currently.    Today, 2/28/2024: Bilateral lateral ankle pain after running and she feels like she inverts her ankle when running.  Does not always wear supportive shoes; often without shoes at home and wears flats while working.  Occasional morning stiffness.  Overall feels like she is doing well with regard to psoriatic arthritis.  No joint swelling.  No change in alcohol consumption.  Celebrex used infrequently.  She is focusing on diet for weight loss.  Has an appointment next week with her PCP to check fasting lipid panel, fasting glucose, and hemoglobin A1c.    Denies fevers, chills, nausea, vomiting, constipation, diarrhea. No abdominal pain.  No black or bloody stools or stomach upset.  No chest pain/pressure, palpitations, or shortness of breath.  No eye pain or redness.    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[employer]   12 point review of system was completed and negative except as noted in the HPI     Active Problem List     Patient Active Problem List   Diagnosis    Contact dermatitis and other eczema, due to unspecified cause    Vitamin D deficiency    CARDIOVASCULAR SCREENING; LDL GOAL LESS THAN 160    Malrotation of intestine (H28)    S/P appy    H/O left inguinal hernia repair    Anxiety    Menorrhagia    Admission for sterilization    Psoriatic arthritis (H)    Sacroiliitis (H24)     Past Medical History     Past Medical History:   Diagnosis Date    Chickenpox     PONV (postoperative nausea and vomiting)      Past Surgical History     Past Surgical History:   Procedure Laterality Date    APPENDECTOMY      DILATION AND CURETTAGE, HYSTEROSCOPY, ABLATE ENDOMETRIUM, COMBINED N/A 5/20/2019    Procedure: HYSTEROSCOPY, WITH DILATION AND CURETTAGE  AND ENDOMETRIAL ABLATION, Laparoscopic Bilateral Tubal Ligation;  Surgeon: Mc Purvis MD;  Location: [facility] OR    LAPAROSCOPIC HERNIORRHAPHY INGUINAL  3/28/2012    Procedure:LAPAROSCOPIC HERNIORRHAPHY INGUINAL; Laparoscopic Left Inguinal Herniorrhaphy Possible Right Inguinal Hernia; Surgeon:EMMANUELLE CHAPPELL; Location:[facility] OR    LAPAROSCOPIC TUBAL LIGATION Bilateral 5/20/2019    Procedure: Laparoscopic Tubal Sterilization;  Surgeon: Mc Purvis MD;  Location: [facility] OR    SURGICAL HISTORY OF -   05/20/06    D&C for incomplete AB     Allergy     Allergies   Allergen Reactions    Keflex [Cephalexin Monohydrate] Rash    Morphine Hives    Shellfish Allergy     Sulfa Antibiotics Rash     Current Medication List     Current Outpatient Medications   Medication Sig    celecoxib (CELEBREX) 100 MG capsule Take 1 capsule (100 mg) by mouth 2 times daily . Stop taking if any associated GI upset.    Cholecalciferol (D3 VITAMIN PO)     clobetasol (TEMOVATE) 0.05 % external ointment Apply sparingly to affected area twice daily as needed.  Do not apply to face.     Secukinumab, 300 MG Dose, (COSENTYX SENSOREADY, 300 MG,) 150 MG/ML SOAJ Inject 300 mg Subcutaneous every 28 days . Hold for infection and seek medical attention.    triamcinolone (KENALOG) 0.1 % external ointment Apply sparingly to affected area three times daily for 14 days.    Omega-3 Fatty Acids (FISH OIL PO)      No current facility-administered medications for this visit.         Social History   See HPI    Family History     Family History   Problem Relation Age of Onset    Psychotic Disorder Maternal Grandmother     Alcohol/Drug Maternal Grandmother         alcohol    Heart Disease Maternal Grandfather         MI    Hypertension Maternal Grandfather     Diabetes Maternal Grandfather     Arthritis Paternal Grandmother         RA    Lipids Paternal Grandmother     Parkinsonism Paternal Grandfather     Heart Disease Paternal Grandfather         pacemaker    Lymphoma Cousin     Breast Cancer Maternal Aunt      Paternal grandmother: RA  Paternal aunt: lupus  Paternal cousin: RA    Physical Exam     Temp Readings from Last 3 Encounters:   02/03/20 98.9  F (37.2  C) (Tympanic)   05/20/19 98.1  F (36.7  C) (Oral)   05/08/19 97.6  F (36.4  C)     BP Readings from Last 5 Encounters:   02/28/24 135/82   02/03/20 122/82   01/15/20 136/82   09/11/19 127/87   05/20/19 112/86     Pulse Readings from Last 1 Encounters:   02/28/24 71     Resp Readings from Last 1 Encounters:   02/28/24 16     Estimated body mass index is 28.71 kg/m  as calculated from the following:    Height as of 2/3/20: 1.524 m (5').    Weight as of this encounter: 66.7 kg (147 lb).    GEN: NAD. Healthy appearing adult.   HEENT:  Anicteric, noninjected sclera. No obvious external lesions of the ear and nose. Hearing intact.  CV: S1, S2. RRR. No m/r/g  PULM: No increased work of breathing. CTA bilaterally   MSK: MCPs, PIPs, DIPs without swelling or tenderness to palpation.  Wrists without swelling or tenderness to palpation.  Elbows and shoulders without  swelling or tenderness to palpation.  Shoulders with normal range of motion.  Knees and MTPs without swelling or tenderness to palpation.   Both ankles mildly tender to palpation inferior and posterior to the lateral malleoli where there was no swelling, increased warmth, or overlying erythema.  Achilles tendons nontender to palpation.    SKIN: No rash or jaundice seen.  No nail pitting.  PSYCH: Alert. Appropriate.      Labs / Imaging (select studies)       2/21/2022 [facility] labs (seen in [facility]) was normal for creatinine, LFTs, ESR, CRP, CBC, QuantiFERON-TB     12/29/2023 [facility] labs (seen in [facility]) showed a normal CBC, normal creatinine, and ALT elevation at 110.    1/18/2024 [facility] lab showed an ALT elevation of 155    1/25/2024 abdominal ultrasound showed a mildly fatty liver    -------------------------------------------------------    RF/CCP  Recent Labs   Lab Test 12/12/18  0920   CCPIGG 1   RHF <20     CBC  Recent Labs   Lab Test 08/07/20  0726 03/26/20  0829 01/13/20  0824   WBC 5.2 7.5 6.0   RBC 4.58 4.68 4.98   HGB 13.7 14.2 15.2   HCT 41.6 43.2 44.9   MCV 91 92 90   RDW 12.7 13.5 13.1    230 258   MCH 29.9 30.3 30.5   MCHC 32.9 32.9 33.9   NEUTROPHIL 59.2 54.1 52.8   LYMPH 25.6 29.2 33.7   MONOCYTE 8.8 8.1 8.1   EOSINOPHIL 6.0 8.1 5.1   BASOPHIL 0.4 0.5 0.3   ANEU 3.1 4.1 3.2   ALYM 1.3 2.2 2.0   LAMONT 0.5 0.6 0.5   AEOS 0.3 0.6 0.3   ABAS 0.0 0.0 0.0     CMP  Recent Labs   Lab Test 02/17/21  0000 08/07/20  0726 03/26/20  0829 01/13/20  0824 09/10/19  0820 12/12/18  0920 05/22/17  1612   NA  --   --   --   --   --  138 136   POTASSIUM  --   --   --   --   --  4.0 3.8   CHLORIDE  --   --   --   --   --  106 105   CO2  --   --   --   --   --  27 22   ANIONGAP  --   --   --   --   --  5 9   GLC  --   --   --   --   --  88 100*   BUN  --   --   --   --   --  11 12   CR 0.95 0.82 0.78 0.76   < > 0.70 0.67   GFRESTIMATED >60 >90 >90 >90   < > >90 >90  Non African American GFR Calc      GFRESTBLACK >60 >90 >90 >90   < > >90 >90  African American GFR Calc     BERNARDO  --   --   --   --   --  8.6 8.7   BILITOTAL  --  0.4 0.4 0.6   < > 0.3 0.2   ALBUMIN  --  3.8 3.9 3.9   < > 4.1 4.1   PROTTOTAL  --  7.2 7.6 7.5   < > 7.3 7.6   ALKPHOS  --  52 45 40   < > 44 47   AST 15 13 13 13   < > 10 14   ALT 24 19 25 21   < > 21 16    < > = values in this interval not displayed.     Calcium/VitaminD  Recent Labs   Lab Test 12/12/18  0920 05/22/17  1612   BERNARDO 8.6 8.7     ESR/CRP  Recent Labs   Lab Test 08/07/20  0726 03/26/20  0829 01/13/20  0824   SED 4 5 5   CRP <2.9 <2.9 <2.9     Hepatitis B  Recent Labs   Lab Test 05/02/19  1400   HBCAB Nonreactive     Hepatitis C  Recent Labs   Lab Test 05/02/19  1400   HCVAB Nonreactive     Lyme ab screening  Recent Labs   Lab Test 12/12/18  0920   LYMEGM 0.04     Tuberculosis Screening  Recent Labs   Lab Test 08/07/20  0726 05/02/19  1400   TBRES  --  Negative   TBRST Negative  --      Immunization History     Immunization History   Administered Date(s) Administered    COVID-19 Bivalent 18+ (Moderna) 11/11/2022    COVID-19 MONOVALENT 12+ (Pfizer) 12/23/2020, 01/13/2021, 08/23/2021    COVID-19 Monovalent 18+ (Moderna) 04/29/2022, 11/11/2022    Influenza (IIV3) PF 10/15/2008, 10/19/2012    Influenza Vaccine >6 months,quad, PF 09/29/2015, 09/26/2016, 10/08/2019, 10/12/2020    Pneumo Conj 13-V (2010&after) 05/08/2019    Pneumococcal 23 valent 09/11/2019    TD,PF 7+ (Tenivac) 02/01/2004    TDAP Vaccine (Adacel) 05/26/2015    Zoster recombinant adjuvanted (SHINGRIX) 10/14/2019, 09/29/2020          Chart documentation done in part with Dragon Voice recognition Software. Although reviewed after completion, some word and grammatical error may remain.    Ronnie Cabrera MD

## 2024-02-29 LAB — ANA SER QL IF: NEGATIVE

## 2024-03-01 LAB — MITOCHONDRIA M2 IGG SER-ACNC: 1.7 U/ML

## 2024-03-02 LAB — SMA IGG SER-ACNC: 6 UNITS

## 2024-04-07 ENCOUNTER — HEALTH MAINTENANCE LETTER (OUTPATIENT)
Age: 39
End: 2024-04-07

## 2024-05-13 DIAGNOSIS — L40.50 PSORIATIC ARTHRITIS (H): ICD-10-CM

## 2024-05-13 RX ORDER — CELECOXIB 100 MG/1
100 CAPSULE ORAL 2 TIMES DAILY PRN
Qty: 60 CAPSULE | Refills: 3 | Status: SHIPPED | OUTPATIENT
Start: 2024-05-13

## 2024-05-13 NOTE — TELEPHONE ENCOUNTER
Requested Prescriptions   Pending Prescriptions Disp Refills    celecoxib (CELEBREX) 100 MG capsule 60 capsule 3     Sig: Take 1 capsule (100 mg) by mouth 2 times daily . Stop taking if any associated GI upset.       There is no refill protocol information for this order        Last Written Prescription Date:  04/20/2023  Last Fill Quantity: 60,  # refills: 3   Last office visit: 2/28/2024 ; last virtual visit: 8/23/2023 with prescribing provider:  Dr. Cabrera   Future Office Visit:  08/30/2024    Labs: 2/28/2024    Melony TURPIN, Specialty RN 5/13/2024 2:45 PM

## 2024-05-13 NOTE — TELEPHONE ENCOUNTER
After chart review and based on prior discussion with MD it was noted that this is appropriate for a refill.    MARY TristanN RN Specialty Triage 5/13/2024 3:05 PM

## 2024-06-16 ENCOUNTER — HEALTH MAINTENANCE LETTER (OUTPATIENT)
Age: 39
End: 2024-06-16

## 2024-07-01 ENCOUNTER — TELEPHONE (OUTPATIENT)
Dept: RHEUMATOLOGY | Facility: CLINIC | Age: 39
End: 2024-07-01
Payer: COMMERCIAL

## 2024-07-01 NOTE — TELEPHONE ENCOUNTER
PA Initiation    Medication: COSENTYX SENSOREADY (300 MG) 150 MG/ML SC SOAJ  Insurance Company: [employer] - Phone [phone] Fax [phone]  Pharmacy Filling the Rx: [facility] [place], [place] - [address]  Filling Pharmacy Phone: [phone]  Filling Pharmacy Fax: [phone]  Start Date: 7/1/2024  JENNA (Key: [id number])    [email or web address]        Thank You,     Alexander Barnes CPhT  Specialty Pharmacy Clinic [facility] Specialty  [email or web address]  [email or web address]  Phone: [phone]  Fax: [phone]

## 2024-07-03 NOTE — TELEPHONE ENCOUNTER
Prior Authorization Approval    Medication: COSENTYX SENSOREADY (300 MG) 150 MG/ML SC SOAJ  Authorization Effective Date: 6/1/2024  Authorization Expiration Date: 7/2/2025  Approved Dose/Quantity: 2 ML / 28 day  Reference #: JENNA (Key: [id number])   Insurance Company: [employer] - Phone [phone] Fax [phone]  Expected CoPay: $    CoPay Card Available: Other (see comments)    Financial Assistance Needed: [email or web address]  Which Pharmacy is filling the prescription: [address]  Pharmacy Notified: renewal  Patient Notified: renewal        Thank You,     Nicki Barnes [facility]  Specialty Pharmacy Clinic [facility] Specialty  [email or web address]  [email or web address]  Phone: [phone]  Fax: [phone]

## 2024-08-27 ENCOUNTER — MYC MEDICAL ADVICE (OUTPATIENT)
Dept: RHEUMATOLOGY | Facility: CLINIC | Age: 39
End: 2024-08-27
Payer: COMMERCIAL

## 2024-08-30 ENCOUNTER — VIRTUAL VISIT (OUTPATIENT)
Dept: RHEUMATOLOGY | Facility: CLINIC | Age: 39
End: 2024-08-30
Payer: COMMERCIAL

## 2024-08-30 DIAGNOSIS — L40.50 PSORIATIC ARTHRITIS (H): Primary | ICD-10-CM

## 2024-08-30 PROCEDURE — G2211 COMPLEX E/M VISIT ADD ON: HCPCS | Mod: 95 | Performed by: INTERNAL MEDICINE

## 2024-08-30 PROCEDURE — 99214 OFFICE O/P EST MOD 30 MIN: CPT | Mod: 95 | Performed by: INTERNAL MEDICINE

## 2024-08-30 NOTE — PROGRESS NOTES
RN: please fax lab orders that were entered on today, to [facility] in [place], [place].    Ronnie Cabrera MD  8/30/2024

## 2024-08-30 NOTE — PATIENT INSTRUCTIONS
RHEUMATOLOGY    [facility]  [address] [place] [address]  [place] [address]    Phone number: [phone]  Fax number: [phone]    If you need a medication refill, please contact us as you may need lab work and/or a follow up visit prior to your refill.      Thank you for choosing [facility]!    Samaria Ye CMA Rheumatology

## 2024-08-30 NOTE — PROGRESS NOTES
Shira Forbes  is a 39 year old year old who is being evaluated via a billable video visit.      How would you like to obtain your AVS? MyChart  If the video visit is dropped, the invitation should be resent by: Text to cell phone: [phone]   Will anyone else be joining your video visit? No     Rheumatology Video Visit      Shira Forbes MRN# [id number]   YOB: 1985 Age: 39 year old      Date of visit: 8/30/24   PCP: Clinic, [facility]    Chief Complaint   Patient presents with:  Psoriatic Arthritis    Assessment and Plan     1.  Psoriatic Arthritis: Asymmetric arthritis involving her MCPs and PIP consistent with psoriatic arthritis; dx'd on 5/1/2019; prolonged morning stiffness; and pain and stiffness that improved with activity and worsened with inactivity.  She has dry skin on her hands that has an appearance more consistent with eczema than psoriasis initially.  Possible nail pitting on one fingernail initially.  RF, CCP, ANTONIO, dsDNA, PR3, and MPO negative; no cytopenias; normal renal function, ESR, and CRP.  SI joint x-rays suggest sacroiliitis and she has inflammatory back pain.  Hand and chest x-rays were okay.  Humira was started in May 2019 with significant improvement of joints and skin; but benefit began wearing off with symptoms worsening 7 days after dosing - worsening peripheral and axial symptoms; also with injection site reactions that have been worsening with each injection.  Therefore, stopped Humira and Rx'd Enbrel but this was denied; her insurance required that she must fail ONE of cimzia, simponi, stelara, and both cosentyx and xeljanz. Therefore Simponi was used but she had worse skin symptoms and worse arthritis symptoms; so changed to Cosentyx but her insurance required Taltz first.  Taltz caused severe site reaction so it was not repeated.  Then off medications because she was potentially working with COVID19 patients and during that time she had worsening  arthritis symptoms when off of treatment, so Cosentyx was started and later the dose was increased.  Psoriatic arthritis controlled at this time.  Note that she is not using naproxen or ibuprofen because of one-time episode of angioedema; has Celebrex now that is well-tolerated and is used infrequently, approximately once monthly.    Chronic illness, stable.    - Continue Cosentyx 300mg SQ every 4 weeks      - Continue Celebrex 100 mg twice daily as needed (using approximately once monthly)   - Labs within the next 6 months: CBC, creatinine, hepatic panel, ESR, CRP    # Birth control: Tubal ligation in 2017    2.  Bilateral hand dermatitis, history: Dermatitis versus psoriasis.  Improves with increased sun exposure.  Does not improve with moisturizers throughout the day.  Not an issue at this time with summer sun exposure and Cosentyx.  If worsening symptoms then should see dermatology.    3.  History of fatigue: Continue regular exercise.  Better during the summer months when her exercises regimen is more routine.    4.  Bilateral lateral ankle pain: No swelling or increased warmth.  Worse after running.  Advised wearing supportive shoes whenever ambulatory, indoors and outdoors.    5.  LFT elevation:  on 1/18/2024 outside labs.  Mild fatty liver seen on outside ultrasound.  Repeat labs in February showed negative hepatitis B and hepatitis C, and an ALT improved to 77 (normal 0-50).  Mitochondrial M2 antibody negative.  Smooth muscle antibody negative.  Positive reinforcement for continued weight loss efforts.  She reports today that she has successfully lost 13 pounds.  - Lab within the next 6 months: Hepatic panel    4.  Vaccinations: Vaccinations reviewed with Ms. Forbes.    - Influenza: encouraged yearly vaccination   - COVID-19: Advised keeping updated    Total minutes spent in evaluation with patient, documentation, , and review of pertinent studies and chart notes: 20  The longitudinal  "plan of care for the rheumatology problem(s) were addressed during this visit.  Due to added complexity of care, we will continue to support the patient and the subsequent management of this condition with ongoing continuity of care.      Ms. Forbes verbalized agreement with and understanding of the rational for the diagnosis and treatment plan.  All questions were answered to best of my ability and the patient's satisfaction. Ms. Forbes was advised to contact the clinic with any questions that may arise after the clinic visit.      Thank you for involving me in the care of the patient    Return to clinic: 6 mo    HPI   Shira Forbes is a 39 year old female with a past medical history significant for contact dermatitis, vitamin D deficiency, and axiety who is seen for follow-up of psoriatic arthritis    [facility]  \"I just want to send you a quick update and a thank you! I have been on Humira for 1 month now and feel amazing! I didn't actually realize how miserable I was.  I am sleeping better at night, not exhausted and can move easier.  Also my skin on my hands is totally clear and has been since after my first dose (this has been huge for me!!!!) Thank you so much! Will see you in September.  Truly life changing! Thank you! Jason\"    9/2019: Ms. Forbes reported that fairly quickly with Humira her arthritis improved.  Morning stiffness for less than 5-10 minutes.  Positive gelling phenomenon that was reduced in severity.  Everything got better including her hands where she no longer had cracking of her skin on the hands.  However, after subsequent dosing from Humira, approximately after 1.5-2 months of using it, the duration of benefit to started to weekend.  Symptoms began returning approximately 7 days after taking the Humira dose.  She also started having injection site reactions that have been worsening with each injection.      1/15/2020: Not doing as well as when she was on Humira.  Some " "\"fogginess\" with Simponi.  Has been on Simponi for more than 3 months.  Feels like her toes are swollen.  Hands have not been doing well; she says when she was on Humira her hands completely cleared up with regard to her skin.  Joint pain worse in the morning and improves with time and activity.  Back and hips are doing okay but other joints are not.  Would like to change medication.    Insurance required Taltz before cosentyx. Taltz resulted in site reaction.  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    4/29/2020: worse ankle/hand > lower back pain and stiffness and she'd like to restart tx.  AM is worse; activity helps. Prolonged morning stiffness.      8/5/2020: feels much better on Cosentyx. Still mild lower back, right plantar fasciitis symptoms, but overall much better. Rash seems to be associated with exposures at work; affecting the hands. Tolerating cosentyx. Back to work now.     2/3/2021: Taking Cosentyx 300 mg every 4 weeks and doing much better than she had been in the past but still achy from time to time and more achy in the few days prior to her next Cosentyx dose.  She says that her insurance is requiring that she changed to Enbrel.  Note that Enbrel was going to be tried in the past but was denied by her insurance.  She is now doing well on Cosentyx and she prefers not changing medication.    8/18/2021: Doing well on Cosentyx 300 mg SQ every 4 weeks.  Not requiring premedication before Cosentyx dosing and not having any injection site reactions.  Occasional ankle ache that is mild and short-lived.  No other joint pain.  No joint swelling or morning stiffness.  Uses naproxen 220 mg, approximately 1-2 times per week    2/23/2022: Arthritis is doing well at this time.  No joint pain or swelling or morning stiffness.  Uses naproxen 1-2 times per week.  Tolerating Cosentyx well.  When off Cosentyx because of COVID-19 infection she had worsening dermatitis on " her hands.  She notes that when she goes to warmer climate during the winter then the dermatitis on her hands significantly improves.  Lotions throughout the day provide minimal benefit.  Fatigue; waking up tired; finding it difficult to exercise because of fatigue; could sleep during the day we given the opportunity to.  Denies snoring.  Denies known apneic episodes.    8/24/2022: Generally doing well with regard to arthritis except for nighttime symptoms that may have pain up to 5/10 but some nights does not have pain.  She finds it if she uses naproxen 220-440 mg nightly or ibuprofen 600 mg nightly then she sleeps through the night without back pain.  Back pain typically improves when she gets up and walks around.  No back pain throughout the day.  No peripheral arthritis.  Morning stiffness for less than 30 minutes.  No gelling.  No active rash on the palms of her hands.  Fatigue has improved during the summer months and she thinks that the fatigue was seasonal, possible seasonal affective disorder    8/23/2023: Currently doing well.  No joint pain or swelling.  Morning stiffness for no more than 30 minutes.  No gelling phenomenon.  Arthritis does not limit her daily activities.  No longer using ibuprofen or naproxen because of the one-time episode of angioedema; now has Celebrex on hand to use as needed, used about once monthly.  Exercising regularly and feels well.  No fatigue currently.    2/28/2024: Bilateral lateral ankle pain after running and she feels like she inverts her ankle when running.  Does not always wear supportive shoes; often without shoes at home and wears flats while working.  Occasional morning stiffness.  Overall feels like she is doing well with regard to psoriatic arthritis.  No joint swelling.  No change in alcohol consumption.  Celebrex used infrequently.  She is focusing on diet for weight loss.  Has an appointment next week with her PCP to check fasting lipid panel, fasting glucose,  and hemoglobin A1c.    Today, 8/30/2024: Has intentionally lost 13 pounds.  Using Celebrex no more than 1-2 times per week.  No change in alcohol consumption.  Feels well.  No joint pain or swelling.  No morning stiffness or gelling phenomenon.  There was one point where she decided to try to reduce Cosentyx to be 150 mg every 4 weeks that resulted in return of symptoms so she increased it back to 300 mg every 4 weeks and has been doing well since then.  No rash on her hands at this time.  Staying physically active.    Denies fevers, chills, nausea, vomiting, constipation, diarrhea. No abdominal pain.  No black or bloody stools or stomach upset.  No chest pain/pressure, palpitations, or shortness of breath.  No eye pain or redness.    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[employer]   12 point review of system was completed and negative except as noted in the HPI     Active Problem List     Patient Active Problem List   Diagnosis    Contact dermatitis and other eczema, due to unspecified cause    Vitamin D deficiency    CARDIOVASCULAR SCREENING; LDL GOAL LESS THAN 160    Malrotation of intestine (H28)    S/P appy    H/O left inguinal hernia repair    Anxiety    Menorrhagia    Admission for sterilization    Psoriatic arthritis (H)    Sacroiliitis (H24)     Past Medical History     Past Medical History:   Diagnosis Date    Chickenpox     PONV (postoperative nausea and vomiting)      Past Surgical History     Past Surgical History:   Procedure Laterality Date    APPENDECTOMY      DILATION AND CURETTAGE, HYSTEROSCOPY, ABLATE ENDOMETRIUM, COMBINED N/A 5/20/2019    Procedure: HYSTEROSCOPY, WITH DILATION AND CURETTAGE  AND ENDOMETRIAL ABLATION, Laparoscopic Bilateral Tubal Ligation;  Surgeon: Mc Purvis MD;  Location: [facility] OR    LAPAROSCOPIC HERNIORRHAPHY INGUINAL  3/28/2012    Procedure:LAPAROSCOPIC HERNIORRHAPHY INGUINAL; Laparoscopic Left Inguinal  Herniorrhaphy Possible Right Inguinal Hernia; Surgeon:EMMANUELLE CHAPPELL; Location:[facility] OR    LAPAROSCOPIC TUBAL LIGATION Bilateral 5/20/2019    Procedure: Laparoscopic Tubal Sterilization;  Surgeon: Mc Purvis MD;  Location: [facility] OR    SURGICAL HISTORY OF -   05/20/06    D&C for incomplete AB     Allergy     Allergies   Allergen Reactions    Keflex [Cephalexin Monohydrate] Rash    Morphine Hives    Shellfish Allergy     Sulfa Antibiotics Rash     Current Medication List     Current Outpatient Medications   Medication Sig Dispense Refill    celecoxib (CELEBREX) 100 MG capsule Take 1 capsule (100 mg) by mouth 2 times daily as needed for moderate pain . Stop taking if any associated GI upset. 60 capsule 3    Cholecalciferol (D3 VITAMIN PO)       clobetasol (TEMOVATE) 0.05 % external ointment Apply sparingly to affected area twice daily as needed.  Do not apply to face. 60 g 1    Omega-3 Fatty Acids (FISH OIL PO)       Secukinumab, 300 MG Dose, (COSENTYX SENSOREADY, 300 MG,) 150 MG/ML SOAJ Inject 300 mg Subcutaneous every 28 days . Hold for infection and seek medical attention. 2 mL 7    triamcinolone (KENALOG) 0.1 % external ointment Apply sparingly to affected area three times daily for 14 days. 80 g 1     No current facility-administered medications for this visit.         Social History   See HPI    Family History     Family History   Problem Relation Age of Onset    Psychotic Disorder Maternal Grandmother     Alcohol/Drug Maternal Grandmother         alcohol    Heart Disease Maternal Grandfather         MI    Hypertension Maternal Grandfather     Diabetes Maternal Grandfather     Arthritis Paternal Grandmother         RA    Lipids Paternal Grandmother     Parkinsonism Paternal Grandfather     Heart Disease Paternal Grandfather         pacemaker    Lymphoma Cousin     Breast Cancer Maternal Aunt      Paternal grandmother: RA  Paternal aunt: lupus  Paternal cousin: RA    Physical Exam     Temp Readings from  Last 3 Encounters:   02/03/20 98.9  F (37.2  C) (Tympanic)   05/20/19 98.1  F (36.7  C) (Oral)   05/08/19 97.6  F (36.4  C)     BP Readings from Last 5 Encounters:   02/28/24 135/82   02/03/20 122/82   01/15/20 136/82   09/11/19 127/87   05/20/19 112/86     Pulse Readings from Last 1 Encounters:   02/28/24 71     Resp Readings from Last 1 Encounters:   02/28/24 16     Estimated body mass index is 28.71 kg/m  as calculated from the following:    Height as of 2/3/20: 1.524 m (5').    Weight as of 2/28/24: 66.7 kg (147 lb).      GEN: NAD. Healthy appearing adult.   HEENT: MMM.  Anicteric, noninjected sclera. No obvious external lesions of the ear and nose. Hearing intact.  PULM: No increased work of breathing  MSK:  Hands and wrists without swelling.   SKIN: No rash or jaundice seen  PSYCH: Alert. Appropriate.      Labs / Imaging (select studies)       2/21/2022 [facility] ([facility] in [facility]) was normal for creatinine, LFTs, ESR, CRP, CBC, QuantiFERON-TB     12/29/2023 [facility] labs (seen in [facility]) showed a normal CBC, normal creatinine, and ALT elevation at 110.    1/18/2024 [facility] lab showed an ALT elevation of 155    1/25/2024 abdominal ultrasound showed a mildly fatty liver    -------------------------------------------------------    RF/CCP  Recent Labs   Lab Test 12/12/18  0920   CCPIGG 1   RHF <20     CBC  Recent Labs   Lab Test 02/28/24  1046 08/07/20  0726 03/26/20  0829 01/13/20  0824   WBC 6.0 5.2 7.5 6.0   RBC 4.91 4.58 4.68 4.98   HGB 14.8 13.7 14.2 15.2   HCT 44.6 41.6 43.2 44.9   MCV 91 91 92 90   RDW 12.6 12.7 13.5 13.1    224 230 258   MCH 30.1 29.9 30.3 30.5   MCHC 33.2 32.9 32.9 33.9   NEUTROPHIL 64 59.2 54.1 52.8   LYMPH 27 25.6 29.2 33.7   MONOCYTE 6 8.8 8.1 8.1   EOSINOPHIL 3 6.0 8.1 5.1   BASOPHIL 1 0.4 0.5 0.3   ANEU  --  3.1 4.1 3.2   ALYM  --  1.3 2.2 2.0   LAMONT  --  0.5 0.6 0.5   AEOS  --  0.3 0.6 0.3   ABAS  --  0.0 0.0 0.0   ANEUTAUTO 3.8  --   --   --    ALYMPAUTO 1.6  --    --   --    AMONOAUTO 0.4  --   --   --    AEOSAUTO 0.2  --   --   --    ABSBASO 0.0  --   --   --      CMP  Recent Labs   Lab Test 02/28/24  1046 02/17/21  0000 08/07/20  0726 03/26/20  0829 09/10/19  0820 12/12/18  0920 05/22/17  1612   NA  --   --   --   --   --  138 136   POTASSIUM  --   --   --   --   --  4.0 3.8   CHLORIDE  --   --   --   --   --  106 105   CO2  --   --   --   --   --  27 22   ANIONGAP  --   --   --   --   --  5 9   GLC  --   --   --   --   --  88 100*   BUN  --   --   --   --   --  11 12   CR 0.75 0.95 0.82 0.78   < > 0.70 0.67   GFRESTIMATED >90 >60 >90 >90   < > >90 >90  Non African American GFR Calc     GFRESTBLACK  --  >60 >90 >90   < > >90 >90  African American GFR Calc     BERNARDO  --   --   --   --   --  8.6 8.7   BILITOTAL 0.3  --  0.4 0.4   < > 0.3 0.2   ALBUMIN 4.5  --  3.8 3.9   < > 4.1 4.1   PROTTOTAL 7.3  --  7.2 7.6   < > 7.3 7.6   ALKPHOS 49  --  52 45   < > 44 47   AST 34 15 13 13   < > 10 14   ALT 77* 24 19 25   < > 21 16    < > = values in this interval not displayed.     1/18/2024 [facility] labs show an ALT of 155    Calcium/VitaminD  Recent Labs   Lab Test 12/12/18  0920 05/22/17  1612   BERNARDO 8.6 8.7     ESR/CRP  Recent Labs   Lab Test 08/07/20  0726 03/26/20  0829 01/13/20  0824   SED 4 5 5   CRP <2.9 <2.9 <2.9       Hepatitis B  Recent Labs   Lab Test 02/28/24  1046 05/02/19  1400   HBCAB Nonreactive Nonreactive   HEPBANG Nonreactive  --      Hepatitis C  Recent Labs   Lab Test 02/28/24  1046 05/02/19  1400   HCVAB Nonreactive Nonreactive     Lyme ab screening  Recent Labs   Lab Test 12/12/18  0920   LYMEGM 0.04       Tuberculosis Screening  Recent Labs   Lab Test 08/07/20  0726 05/02/19  1400   TBRES  --  Negative   TBRST Negative  --      Immunization History     Immunization History   Administered Date(s) Administered    COVID-19 Bivalent 18+ (Moderna) 11/11/2022    COVID-19 MONOVALENT 12+ (Pfizer) 12/23/2020, 01/13/2021, 08/23/2021    COVID-19 Monovalent 18+ (Moderna)  04/29/2022, 11/11/2022    Influenza (IIV3) PF 10/15/2008, 10/19/2012    Influenza Vaccine >6 months,quad, PF 09/29/2015, 09/26/2016, 10/08/2019, 10/12/2020    Pneumo Conj 13-V (2010&after) 05/08/2019    Pneumococcal 23 valent 09/11/2019    TD,PF 7+ (Tenivac) 02/01/2004    TDAP Vaccine (Adacel) 05/26/2015    Zoster recombinant adjuvanted (SHINGRIX) 10/14/2019, 09/29/2020          Chart documentation done in part with Dragon Voice recognition Software. Although reviewed after completion, some word and grammatical error may remain.      Video-Visit Details    Type of service:  Video Visit    Video Start Time: 7:20 AM    Video End Time:7:30 AM    Originating Location (pt. Location): Stationary vehicle, [place]    Distant Location (provider location):  off site, [place]    Platform used for Video Visit: Sukh Cabrera MD

## 2025-04-19 ENCOUNTER — HEALTH MAINTENANCE LETTER (OUTPATIENT)
Age: 40
End: 2025-04-19

## 2025-06-04 ENCOUNTER — TELEPHONE (OUTPATIENT)
Dept: RHEUMATOLOGY | Facility: CLINIC | Age: 40
End: 2025-06-04
Payer: COMMERCIAL

## 2025-06-04 NOTE — TELEPHONE ENCOUNTER
PA Initiation    Medication: SECUKINUMAB (300 MG DOSE) 150 MG/ML SC Touch Payments  Insurance Company: MEDICA - Phone [phone] Fax [phone]:  1MEDICA  Pharmacy Filling the Rx: [place], [place] - [address]  Filling Pharmacy Phone: [phone]  Filling Pharmacy Fax:    Start Date: 6/4/2025  JENNA (Key: [id number])        Thank You,     Nicki Barnes [facility]  Specialty Pharmacy Clinic [facility] Specialty  [email or web address]  [email or web address]  Phone: [phone]  Fax: [phone]

## 2025-06-09 NOTE — TELEPHONE ENCOUNTER
Prior Authorization Approval    Medication: SECUKINUMAB (300 MG DOSE) 150 MG/ML SC SOSY  Authorization Effective Date: 5/5/2025  Authorization Expiration Date: 6/5/2026  Approved Dose/Quantity: 2 ML per 28-day supply  Reference #: [id number] (Key: [id number])   Insurance Company: MEDICA - Phone [phone] Fax [phone]:  1MEDICA  Expected CoPay: $    CoPay Card Available: Other (see comments)    Financial Assistance Needed: [email or web address]  Which Pharmacy is filling the prescription: [address]  Pharmacy Notified: renewal  Patient Notified: renewal          Thank You,     Nicki Barnes [facility]  Specialty Pharmacy Clinic [facility] Specialty  [email or web address]  [email or web address]  Phone: [phone]  Fax: [phone]

## 2025-08-20 ENCOUNTER — OFFICE VISIT (OUTPATIENT)
Dept: RHEUMATOLOGY | Facility: CLINIC | Age: 40
End: 2025-08-20
Payer: COMMERCIAL

## 2025-08-20 VITALS
HEART RATE: 89 BPM | OXYGEN SATURATION: 99 % | RESPIRATION RATE: 16 BRPM | BODY MASS INDEX: 26.48 KG/M2 | SYSTOLIC BLOOD PRESSURE: 145 MMHG | WEIGHT: 135.6 LBS | DIASTOLIC BLOOD PRESSURE: 99 MMHG

## 2025-08-20 DIAGNOSIS — L40.50 PSORIATIC ARTHRITIS (H): Primary | ICD-10-CM

## 2025-08-20 DIAGNOSIS — L30.9 HAND DERMATITIS: ICD-10-CM

## 2025-08-20 PROCEDURE — G2211 COMPLEX E/M VISIT ADD ON: HCPCS | Performed by: INTERNAL MEDICINE

## 2025-08-20 PROCEDURE — 3077F SYST BP >= 140 MM HG: CPT | Performed by: INTERNAL MEDICINE

## 2025-08-20 PROCEDURE — 3080F DIAST BP >= 90 MM HG: CPT | Performed by: INTERNAL MEDICINE

## 2025-08-20 PROCEDURE — 99214 OFFICE O/P EST MOD 30 MIN: CPT | Performed by: INTERNAL MEDICINE

## 2025-08-20 RX ORDER — CELECOXIB 100 MG/1
100 CAPSULE ORAL 2 TIMES DAILY PRN
Qty: 180 CAPSULE | Refills: 0 | Status: CANCELLED | OUTPATIENT
Start: 2025-08-20

## 2025-08-20 RX ORDER — SECUKINUMAB 150 MG/ML
300 INJECTION SUBCUTANEOUS
Qty: 2 ML | Refills: 7 | OUTPATIENT
Start: 2025-08-20

## (undated) DEVICE — DRSG TELFA 3X8" 1238

## (undated) DEVICE — LUBRICATING JELLY 4.25OZ

## (undated) DEVICE — SOL NACL 0.9% IRRIG 1000ML BOTTLE 07138-09

## (undated) DEVICE — PACK LAPAROSCOPY/PELVISCOPY STD

## (undated) DEVICE — NDL SPINAL 22GA 3.5" QUINCKE 405181

## (undated) DEVICE — SU VICRYL 0 TIE 54" UND J287G

## (undated) DEVICE — STOCKING SLEEVE COMPRESSION CALF LG

## (undated) DEVICE — DEVICE ENDOMETRIAL ABLATION SYS MINERVA HANDPIECE MIN9770

## (undated) DEVICE — ENDO TROCAR SHIELDED BLADED KII ADV FIX 11X100MM CFB33

## (undated) DEVICE — STOCKING SLEEVE COMPRESSION CALF MED

## (undated) DEVICE — PREP SKIN SCRUB TRAY 4461A

## (undated) DEVICE — GLOVE PROTEXIS W/NEU-THERA 7.5  2D73TE75

## (undated) DEVICE — SYR 20ML LL W/O NDL

## (undated) DEVICE — ESU HOLSTER PLASTIC DISP E2400

## (undated) DEVICE — ANTIFOG SOLUTION W/FOAM PAD 31142527

## (undated) DEVICE — ESU LIGASURE MARYLAND LAPAROSCOPIC SLR/DVDR 5MMX37CM LF1937

## (undated) DEVICE — SOL WATER IRRIG 1000ML BOTTLE 07139-09

## (undated) DEVICE — ADH SKIN CLOSURE PREMIERPRO EXOFIN 1.0ML 3470

## (undated) DEVICE — ENDO TROCAR SLEEVE KII ADV FIXATION 05X100MM CFS02

## (undated) DEVICE — ESU CORD MONOPOLAR 10'  E0510

## (undated) DEVICE — GOWN IMPERVIOUS SPECIALTY XLG/XLONG 32474

## (undated) DEVICE — TUBING CYSTO/BLADDER IRRIG SET 80" 06544-01

## (undated) DEVICE — TUBING C02 INSUFFLATION LAP FILTER HEATER 6198

## (undated) DEVICE — ENDO TROCAR OPTICAL ACCESS KII Z-THRD 08X100MM C0Q19

## (undated) DEVICE — ESU HANDPIECE BIPOLAR THUNDERBEAT FC 5MMX35CM TB-0535FC

## (undated) DEVICE — DECANTER VIAL 2006S

## (undated) DEVICE — NDL BUTTERFLY 25GA .75" 367298

## (undated) DEVICE — SU VICRYL 4-0 FS-2 27" J422-H

## (undated) DEVICE — PAD PERI INDIV WRAP 11" 2022

## (undated) DEVICE — GLOVE PROTEXIS W/NEU-THERA 8.0  2D73TE80

## (undated) DEVICE — ENDO TROCAR FIRST ENTRY KII FIOS ADV FIX 05X100MM CFF03

## (undated) DEVICE — DRAPE POUCH INSTRUMENT 3 POCKET 1018L

## (undated) RX ORDER — BUPIVACAINE HYDROCHLORIDE AND EPINEPHRINE 5; 5 MG/ML; UG/ML
INJECTION, SOLUTION EPIDURAL; INTRACAUDAL; PERINEURAL
Status: DISPENSED
Start: 2019-05-20

## (undated) RX ORDER — BUPIVACAINE HYDROCHLORIDE 2.5 MG/ML
INJECTION, SOLUTION INFILTRATION; PERINEURAL
Status: DISPENSED
Start: 2019-05-20

## (undated) RX ORDER — FENTANYL CITRATE 50 UG/ML
INJECTION, SOLUTION INTRAMUSCULAR; INTRAVENOUS
Status: DISPENSED
Start: 2019-05-20

## (undated) RX ORDER — GABAPENTIN 300 MG/1
CAPSULE ORAL
Status: DISPENSED
Start: 2019-05-20

## (undated) RX ORDER — ONDANSETRON 2 MG/ML
INJECTION INTRAMUSCULAR; INTRAVENOUS
Status: DISPENSED
Start: 2019-05-20

## (undated) RX ORDER — KETOROLAC TROMETHAMINE 30 MG/ML
INJECTION, SOLUTION INTRAMUSCULAR; INTRAVENOUS
Status: DISPENSED
Start: 2019-05-20

## (undated) RX ORDER — SCOLOPAMINE TRANSDERMAL SYSTEM 1 MG/1
PATCH, EXTENDED RELEASE TRANSDERMAL
Status: DISPENSED
Start: 2019-05-20

## (undated) RX ORDER — LIDOCAINE HYDROCHLORIDE 20 MG/ML
JELLY TOPICAL
Status: DISPENSED
Start: 2019-05-20

## (undated) RX ORDER — DEXAMETHASONE SODIUM PHOSPHATE 4 MG/ML
INJECTION, SOLUTION INTRA-ARTICULAR; INTRALESIONAL; INTRAMUSCULAR; INTRAVENOUS; SOFT TISSUE
Status: DISPENSED
Start: 2019-05-20

## (undated) RX ORDER — PROPOFOL 10 MG/ML
INJECTION, EMULSION INTRAVENOUS
Status: DISPENSED
Start: 2019-05-20

## (undated) RX ORDER — ACETAMINOPHEN 325 MG/1
TABLET ORAL
Status: DISPENSED
Start: 2019-05-20

## (undated) RX ORDER — MEPERIDINE HYDROCHLORIDE 25 MG/ML
INJECTION INTRAMUSCULAR; INTRAVENOUS; SUBCUTANEOUS
Status: DISPENSED
Start: 2019-05-20

## (undated) RX ORDER — NEOSTIGMINE METHYLSULFATE 1 MG/ML
VIAL (ML) INJECTION
Status: DISPENSED
Start: 2019-05-20

## (undated) RX ORDER — GLYCOPYRROLATE 0.2 MG/ML
INJECTION, SOLUTION INTRAMUSCULAR; INTRAVENOUS
Status: DISPENSED
Start: 2019-05-20